# Patient Record
Sex: FEMALE | Race: WHITE | NOT HISPANIC OR LATINO | Employment: FULL TIME | URBAN - METROPOLITAN AREA
[De-identification: names, ages, dates, MRNs, and addresses within clinical notes are randomized per-mention and may not be internally consistent; named-entity substitution may affect disease eponyms.]

---

## 2019-05-10 ENCOUNTER — TRANSFERRED RECORDS (OUTPATIENT)
Dept: HEALTH INFORMATION MANAGEMENT | Facility: CLINIC | Age: 29
End: 2019-05-10

## 2019-11-11 ENCOUNTER — OFFICE VISIT (OUTPATIENT)
Dept: FAMILY MEDICINE | Facility: CLINIC | Age: 29
End: 2019-11-11
Payer: COMMERCIAL

## 2019-11-11 DIAGNOSIS — C81.98 HODGKIN'S DISEASE OF LYMPH NODES OF MULTIPLE SITES (H): ICD-10-CM

## 2019-11-11 DIAGNOSIS — H69.93 DYSFUNCTION OF BOTH EUSTACHIAN TUBES: Primary | ICD-10-CM

## 2019-11-11 DIAGNOSIS — F19.10 SUBSTANCE ABUSE (H): ICD-10-CM

## 2019-11-11 PROCEDURE — 99203 OFFICE O/P NEW LOW 30 MIN: CPT | Performed by: PHYSICIAN ASSISTANT

## 2019-11-11 RX ORDER — METHYLPREDNISOLONE 4 MG
TABLET, DOSE PACK ORAL
Qty: 21 TABLET | Refills: 0 | Status: SHIPPED | OUTPATIENT
Start: 2019-11-11 | End: 2020-10-16

## 2019-11-12 PROBLEM — C81.98 HODGKIN'S DISEASE OF LYMPH NODES OF MULTIPLE SITES (H): Status: ACTIVE | Noted: 2019-11-12

## 2019-11-12 NOTE — PROGRESS NOTES
Subjective     Noemí Garcia is a 29 year old female who presents to clinic today for the following health issues:    HPI   30 y/o NP female here to discuss some trouble hearing.  Ears feels a bit plugged.  This has been going on a few weeks.  She is a new resident at [facility].  Things are going well on that end.  She does admit to some mild congestion and cold symptoms, nothing too bad.  Denies fevers or chills.  No active treatment.    Her medical hx is complicated by Hodgkin's lymphoma.  Her last visit with them was earlier in the year.  Gets care in [place].  She us s/p chemo and lymph node removal, and doing well.  Thinks she is due for follow up.        BP Readings from Last 3 Encounters:   No data found for BP    Wt Readings from Last 3 Encounters:   No data found for Wt                      Reviewed and updated as needed this visit by Provider         Review of Systems   ROS COMP: Constitutional, HEENT, cardiovascular, pulmonary, gi and gu systems are negative, except as otherwise noted.      Objective    There were no vitals taken for this visit.  There is no height or weight on file to calculate BMI.  Physical Exam   GENERAL: alert and no distress  EYES: Eyes grossly normal to inspection  HENT: normal cephalic/atraumatic, both ears: clear effusion, oropharynx clear and oral mucous membranes moist  RESP: lungs clear to auscultation - no rales, rhonchi or wheezes  PSYCH: mentation appears normal, affect normal/bright    Diagnostic Test Results:  Labs reviewed in Epic        Assessment & Plan     1. Dysfunction of both eustachian tubes    - methylPREDNISolone (MEDROL DOSEPAK) 4 MG tablet therapy pack; Follow Package Directions  Dispense: 21 tablet; Refill: 0    2. Substance abuse (H)  Stable, living in [facility], doing well.    3. Hodgkin's disease of lymph nodes of multiple sites (H)  Stable, unsure of remission status.  Thinks she is due for follow up, which she plans on scheduling.                Return in about 2 weeks (around 11/25/2019) for If symptoms persist or worsen.    Gui Lane PA-C  [facility]

## 2020-10-06 ENCOUNTER — OFFICE VISIT (OUTPATIENT)
Dept: OTOLARYNGOLOGY | Facility: OTHER | Age: 30
End: 2020-10-06
Attending: OTOLARYNGOLOGY
Payer: COMMERCIAL

## 2020-10-06 DIAGNOSIS — K11.8 LESION OF SALIVARY GLAND: Primary | ICD-10-CM

## 2020-10-06 PROCEDURE — G0463 HOSPITAL OUTPT CLINIC VISIT: HCPCS

## 2020-10-13 NOTE — PROGRESS NOTES
document embedded image  Patient Name: Noemí Garcia    Address: [address] [place] [address]    YOB: 1990    [place] [address]    MR Number: [id number]    Phone: [phone]  PCP: UNKNOWN            Appointment Date: 10/06/20   Visit Provider: Sammy Marquez MD    cc: UNKNOWN; ~    ENT Progress Note    Visit Reasons: Cyst in Salivary gland    HPI  History of Present Illness  Chief complaint:  Cystic mass right submandibular triangle    History  The patient is a 29-year-old female whose had a variable cystic mass in her right submandibular triangle over the last 6 months to a year.  She had a workup at [facility] that included a soft tissue CT with contrast of her neck.  This confirmed a cystic mass involving her left submandibular triangle and submandibular gland.  She was told surgical intervention was warranted.  She subsequently moved here to the [facility] and is seeking assistance at this time.  She does not have her films available for    Exam  Neck-there is soft pliable cystic mass posteriorly in the submandibular triangle on the right.  There is no surrounding adenopathy.  Oral cavity oropharynx-no evidence of a ranula involving her floor of mouth.  There are no other mucosal lesions  Nasal-no obstruction or purulence  Head and neck integument-Clear  General-the patient appears well and in no distress  Neuro-there are no focal cranial nerve deficits    Home Medications     - Last Reconciled 10/07/20 by Marilyn Andrew CMA    hydroxyzine HCl   PO    Allergies    No Known Allergies Allergy (Unverified 10/07/20 09:52)    PFSH  PFSH:     Medical History (Updated 10/07/20 @ 09:52 by Marilyn Andrew CMA)    Cancer  Hearing loss  Kidney disease     Social History (Updated 10/07/20 @ 09:52 by Marilyn Andrew CMA)  Smoking Status:  Never smoker  alcohol intake:  never       A&P  Assessment & Plan  (1) Lesion of salivary gland:        Status: Acute         Code(s):  K11.8 - Other diseases of salivary glands  Additional Plan Details  Plan Details: The procedure for removal of a submandibular salivary gland was thoroughly reviewed for the patient.  She does seem to understand and wish to proceed.  I would like to get copies of her films [facility] forwarded to us before proceeding with surgery.      Sammy Marquez MD    10/06/20 7242    <Electronically signed by Sammy Marquez MD> 10/07/20 3275

## 2020-10-15 PROBLEM — R42 LIGHTHEADEDNESS: Status: ACTIVE | Noted: 2020-02-28

## 2020-10-15 PROBLEM — F31.81 BIPOLAR II DISORDER, MODERATE, DEPRESSED, WITH ANXIOUS DISTRESS (H): Status: ACTIVE | Noted: 2018-10-05

## 2020-10-15 NOTE — PROGRESS NOTES
"  [facility]  [address]  [place] [address]  Phone: [phone]  Fax: [phone]  Primary Provider: No Ref-Primary, Physician  Pre-op Performing Provider: RACHELLE TRAN    PREOPERATIVE EVALUATION:  Today's date: 10/16/2020    Noemí Garcia is a 29 year old female who presents for a preoperative evaluation.    Nursing Notes:   Vee Rose LPN  10/16/2020  8:36 AM  Sign at exiting of workspace  Date of Surgery: 10/22/2020   Type of Surgery: removal of a submandibular salivary gland   Surgeon: [facility]:  [facility]   Fax:     Fever/Chills or other infectious symptoms in past month: no  >10lb weight loss in past two months: no  O2 SAT: 97    Health Care Directive/Code status:  NO  Hx of blood transfusions:   no  Td up to date:  yes  History of VRE/MRSA:  no Date:     Preoperative Evaluation: Obstructive Sleep Apnea screening    S: Snore -  Do you snore loudly? (louder than talking or loud enough to be heard through closed doors)no  T: Tired - Do you often feel tired, fatigued, or sleepy during the daytime?no  O: Observed - Has anyone ever observed you stop breathing during your sleep?no  P: Pressure - Do you have or are you being treated for high blood pressure?no  B: BMI - BMI greater than 35kg/m2?no  A: Age - Age over 50 years old?no  N: Neck - Neck circumference greater than 40 cm?no  G: Gender - Gender: Male?no    Total number of \"YES\" responses:  0    Scoring: Low risk of ANAM 0-2  At Risk of ANAM: >3 High Risk of ANAM: 5-8    Vee Rose LPN 10/16/2020 8:29 AM        Subjective     HPI related to upcoming procedure:   Presented to ENT early 10/2020 for evaluation following a workup completed [place] [facility] that showed a cystic mass in her right submandibular triangle. Had soft tissue CT with contast of her neck completed at [facility] which confirmed the cystic mass. Recently moved to this area so followed up with Dr. Marquez who recommended surgical treatment " at this time.     Acne: Patient also notes she has been struggling more with facial acne for about the past year. Has not responded to multiple OTC options so now she is to the point where she is not using any facial cleanser or moisturizer. Requesting a prescription treatment today. Has not tried any prescription treatments in the past.       Health Care Directive:  Patient does not have a Health Care Directive or Living Will: Discussed advance care planning with patient; however, patient declined at this time.    Preoperative Review of :   reviewed - no record of controlled substances prescribed.      CHRONIC MEDICAL CONDITIONS:  Hodgkin's disease of lymph nodes: Diagnosed and treated in 2013. Went through chemotherapy. H as been clear since. Continues to follow with oncology yearly.    SUN: Currently well controlled. Takes hydroxyzine as needed.       Review of Systems  CONSTITUTIONAL: NEGATIVE for fever, chills, change in weight  INTEGUMENTARY/SKIN: NEGATIVE for worrisome rashes, moles or lesions  EYES: NEGATIVE for vision changes or irritation  ENT/MOUTH: NEGATIVE for ear, mouth and throat problems  RESP: NEGATIVE for significant cough or SOB  BREAST: NEGATIVE for masses, tenderness or discharge  CV: NEGATIVE for chest pain, palpitations or peripheral edema  GI: NEGATIVE for nausea, abdominal pain, heartburn, or change in bowel habits  : NEGATIVE for frequency, dysuria, or hematuria  MUSCULOSKELETAL: NEGATIVE for significant arthralgias or myalgia  NEURO: NEGATIVE for weakness, dizziness or paresthesias  ENDOCRINE: NEGATIVE for temperature intolerance, skin/hair changes  HEME: NEGATIVE for bleeding problems  PSYCHIATRIC: NEGATIVE for changes in mood or affect    Patient Active Problem List    Diagnosis Date Noted     Hodgkin's disease of lymph nodes of multiple sites (H) 11/12/2019     Priority: Medium      No past medical history on file.  Past Surgical History:   Procedure Laterality Date      CHOLECYSTECTOMY       Current Outpatient Medications   Medication Sig Dispense Refill     methylPREDNISolone (MEDROL DOSEPAK) 4 MG tablet therapy pack Follow Package Directions 21 tablet 0       Not on File     Social History     Tobacco Use     Smoking status: Not on file   Substance Use Topics     Alcohol use: Not on file     No family history on file.  History   Drug Use Not on file         Objective     There were no vitals taken for this visit.    Physical Exam    GENERAL APPEARANCE: healthy, alert and no distress     EYES: EOMI, PERRL     HENT: ear canals and TM's normal and nose and mouth without ulcers or lesions     NECK: no adenopathy, no asymmetry, masses, or scars and thyroid normal to palpation     RESP: lungs clear to auscultation - no rales, rhonchi or wheezes     CV: regular rates and rhythm, normal S1 S2, no S3 or S4 and no murmur, click or rub     ABDOMEN:  soft, nontender, no HSM or masses and bowel sounds normal     MS: extremities normal- no gross deformities noted, no evidence of inflammation in joints, FROM in all extremities.     SKIN: no suspicious lesions or rashes     NEURO: Normal strength and tone, sensory exam grossly normal, mentation intact and speech normal     PSYCH: mentation appears normal. and affect normal/bright     LYMPHATICS: No cervical adenopathy    No results for input(s): HGB, PLT, INR, NA, POTASSIUM, CR, A1C in the last 39714 hours.     Diagnostics:  Labs pending at this time.  Results will be reviewed when available.   No EKG required, no history of coronary heart disease, significant arrhythmia, peripheral arterial disease or other structural heart disease.    Revised Cardiac Risk Index (RCRI):  The patient has the following serious cardiovascular risks for perioperative complications:   - No serious cardiac risks = 0 points     RCRI Interpretation: 0 points: Class I (very low risk - 0.4% complication rate)           Assessment & Plan   The proposed surgical procedure  is considered INTERMEDIATE risk.      ICD-10-CM    1. Preop general physical exam  Z01.818    2. Lesion of salivary gland  K11.8    3. Hodgkin's disease of lymph nodes of multiple sites (H)  C81.98    4. Bipolar II disorder, moderate, depressed, with anxious distress (H)  F31.81    5. Generalized anxiety disorder  F41.1    COVID test ordered and is scheduled to be completed on 10/18/2020.  Given prescription for topical benzoyl peroxide-clindamycin for acne treatment. Educated on medication, use, and side effects. Also encouraged use of daily facial moisturizer. Follow up as needed.     Medication Instructions:  Patient is to take all scheduled medications on the day of surgery    RECOMMENDATION:  APPROVAL GIVEN to proceed with proposed procedure, without further diagnostic evaluation.    Signed Electronically by: Juana Delgado PA-C    Copy of this evaluation report is provided to requesting physician.    [facility]    Revised Cardiac Risk Index

## 2020-10-15 NOTE — PATIENT INSTRUCTIONS

## 2020-10-16 ENCOUNTER — OFFICE VISIT (OUTPATIENT)
Dept: FAMILY MEDICINE | Facility: OTHER | Age: 30
End: 2020-10-16
Attending: PHYSICIAN ASSISTANT
Payer: COMMERCIAL

## 2020-10-16 VITALS
RESPIRATION RATE: 12 BRPM | DIASTOLIC BLOOD PRESSURE: 66 MMHG | OXYGEN SATURATION: 97 % | BODY MASS INDEX: 21.35 KG/M2 | HEIGHT: 62 IN | WEIGHT: 116 LBS | SYSTOLIC BLOOD PRESSURE: 124 MMHG | HEART RATE: 68 BPM | TEMPERATURE: 97.2 F

## 2020-10-16 DIAGNOSIS — C81.98 HODGKIN'S DISEASE OF LYMPH NODES OF MULTIPLE SITES (H): ICD-10-CM

## 2020-10-16 DIAGNOSIS — F41.1 GENERALIZED ANXIETY DISORDER: ICD-10-CM

## 2020-10-16 DIAGNOSIS — Z01.818 PREOP GENERAL PHYSICAL EXAM: Primary | ICD-10-CM

## 2020-10-16 DIAGNOSIS — K11.8 LESION OF SALIVARY GLAND: ICD-10-CM

## 2020-10-16 DIAGNOSIS — L70.0 ACNE VULGARIS: ICD-10-CM

## 2020-10-16 DIAGNOSIS — F31.81 BIPOLAR II DISORDER, MODERATE, DEPRESSED, WITH ANXIOUS DISTRESS (H): ICD-10-CM

## 2020-10-16 LAB — HGB BLD-MCNC: 14.8 G/DL (ref 11.7–15.7)

## 2020-10-16 PROCEDURE — 36415 COLL VENOUS BLD VENIPUNCTURE: CPT | Mod: ZL | Performed by: PHYSICIAN ASSISTANT

## 2020-10-16 PROCEDURE — G0463 HOSPITAL OUTPT CLINIC VISIT: HCPCS

## 2020-10-16 PROCEDURE — 85018 HEMOGLOBIN: CPT | Mod: ZL | Performed by: PHYSICIAN ASSISTANT

## 2020-10-16 PROCEDURE — 99214 OFFICE O/P EST MOD 30 MIN: CPT | Performed by: PHYSICIAN ASSISTANT

## 2020-10-16 RX ORDER — HYDROXYZINE HYDROCHLORIDE 25 MG/1
25 TABLET, FILM COATED ORAL
COMMUNITY
Start: 2019-05-10 | End: 2021-02-02

## 2020-10-16 RX ORDER — CLINDAMYCIN AND BENZOYL PEROXIDE 10; 50 MG/G; MG/G
GEL TOPICAL 2 TIMES DAILY
Qty: 50 G | Refills: 3 | Status: ON HOLD | OUTPATIENT
Start: 2020-10-16 | End: 2021-07-31

## 2020-10-16 SDOH — HEALTH STABILITY: MENTAL HEALTH: HOW OFTEN DO YOU HAVE A DRINK CONTAINING ALCOHOL?: NOT ASKED

## 2020-10-16 SDOH — HEALTH STABILITY: MENTAL HEALTH: HOW MANY STANDARD DRINKS CONTAINING ALCOHOL DO YOU HAVE ON A TYPICAL DAY?: NOT ASKED

## 2020-10-16 SDOH — HEALTH STABILITY: MENTAL HEALTH: HOW OFTEN DO YOU HAVE 6 OR MORE DRINKS ON ONE OCCASION?: NOT ASKED

## 2020-10-16 ASSESSMENT — MIFFLIN-ST. JEOR: SCORE: 1194.42

## 2020-10-16 NOTE — NURSING NOTE
"Date of Surgery: 10/22/2020   Type of Surgery: removal of a submandibular salivary gland   Surgeon: [facility]:  [facility]   Fax:     Fever/Chills or other infectious symptoms in past month: no  >10lb weight loss in past two months: no  O2 SAT: 97    Health Care Directive/Code status:  NO  Hx of blood transfusions:   no  Td up to date:  yes  History of VRE/MRSA:  no Date:     Preoperative Evaluation: Obstructive Sleep Apnea screening    S: Snore -  Do you snore loudly? (louder than talking or loud enough to be heard through closed doors)no  T: Tired - Do you often feel tired, fatigued, or sleepy during the daytime?no  O: Observed - Has anyone ever observed you stop breathing during your sleep?no  P: Pressure - Do you have or are you being treated for high blood pressure?no  B: BMI - BMI greater than 35kg/m2?no  A: Age - Age over 50 years old?no  N: Neck - Neck circumference greater than 40 cm?no  G: Gender - Gender: Male?no    Total number of \"YES\" responses:  0    Scoring: Low risk of ANAM 0-2  At Risk of ANAM: >3 High Risk of ANAM: 5-8    Vee Rose LPN 10/16/2020 8:29 AM    "

## 2020-10-18 ENCOUNTER — ALLIED HEALTH/NURSE VISIT (OUTPATIENT)
Dept: FAMILY MEDICINE | Facility: OTHER | Age: 30
End: 2020-10-18
Payer: COMMERCIAL

## 2020-10-18 DIAGNOSIS — Z20.822 COVID-19 RULED OUT: Primary | ICD-10-CM

## 2020-10-18 DIAGNOSIS — Z01.818 PREOP GENERAL PHYSICAL EXAM: ICD-10-CM

## 2020-10-18 PROCEDURE — C9803 HOPD COVID-19 SPEC COLLECT: HCPCS

## 2020-10-18 PROCEDURE — U0003 INFECTIOUS AGENT DETECTION BY NUCLEIC ACID (DNA OR RNA); SEVERE ACUTE RESPIRATORY SYNDROME CORONAVIRUS 2 (SARS-COV-2) (CORONAVIRUS DISEASE [COVID-19]), AMPLIFIED PROBE TECHNIQUE, MAKING USE OF HIGH THROUGHPUT TECHNOLOGIES AS DESCRIBED BY CMS-2020-01-R: HCPCS | Mod: ZL | Performed by: PHYSICIAN ASSISTANT

## 2020-10-18 PROCEDURE — 99207 PR NO CHARGE NURSE ONLY: CPT

## 2020-10-19 LAB
SARS-COV-2 RNA SPEC QL NAA+PROBE: NOT DETECTED
SPECIMEN SOURCE: NORMAL

## 2020-11-09 ENCOUNTER — VIRTUAL VISIT (OUTPATIENT)
Dept: OBGYN | Facility: OTHER | Age: 30
End: 2020-11-09
Attending: STUDENT IN AN ORGANIZED HEALTH CARE EDUCATION/TRAINING PROGRAM
Payer: COMMERCIAL

## 2020-11-09 DIAGNOSIS — O03.9 MISCARRIAGE: Primary | ICD-10-CM

## 2020-11-09 PROCEDURE — 99207 PR OB VISIT-NO CHARGE - GICH ONLY: CPT | Mod: TEL

## 2020-11-09 NOTE — PROGRESS NOTES
Call to patient who states she that she had a miscarriage. She denies fever, severe pain, or excessive bleeding. Discussed warning signs to watch for with fever severe pain, or bleeding more than one pad per hour. She voices understanding.     Lorna Thacker RN on 11/9/2020 at 1:56 PM

## 2020-11-09 NOTE — PROGRESS NOTES
Call to patient X2 to attempt for intake visit.     Lorna Thacker RN on 11/9/2020 at 1:48 PM

## 2020-11-10 ENCOUNTER — OFFICE VISIT (OUTPATIENT)
Dept: OTOLARYNGOLOGY | Facility: OTHER | Age: 30
End: 2020-11-10
Attending: OTOLARYNGOLOGY
Payer: COMMERCIAL

## 2020-11-10 DIAGNOSIS — Z09 POSTOP CHECK: Primary | ICD-10-CM

## 2020-11-10 PROCEDURE — G0463 HOSPITAL OUTPT CLINIC VISIT: HCPCS

## 2020-11-17 NOTE — PROGRESS NOTES
document embedded image  Patient Name: Noemí Garcia    Address: [address] [place] [address]    YOB: 1990    [place] [address]    MR Number: [id number]    Phone: [phone]  PCP: UNKNOWN            Appointment Date: 11/10/20   Visit Provider: Sammy Marquez MD    cc: UNKNOWN; ~    ENT Progress Note    Visit Reasons: Post OP EX of Cyst    HPI  History of Present Illness  Chief complaint:  Postop check    History  The patient is a 1-year-old female who recently underwent excision of a fairly large ranula of her submandibular triangle on the right.  She is having no difficulties postoperatively.  She had minimal discomfort.    Exam  Her surgical incision is healing beautifully.  There is no evidence of inflammation or infection.  There is no evidence of intraoral persistent mass  Her final pathology was consistent with ranula    Allergies    No Known Allergies Allergy (Unverified 10/07/20 09:52)    PFSH  PFSH:     Medical History (Updated 11/10/20 @ 13:56 by Sammy Marquez MD)    Cancer  Hearing loss  Kidney disease     Social History (Updated 10/07/20 @ 09:52 by Marilyn Andrew CMA)  Smoking Status:  Never smoker  alcohol intake:  never       A&P  Assessment & Plan  (1) Postop check:        Status: Acute        Code(s):  Z09 - Encounter for follow-up examination after completed treatment for conditions other than malignant neoplasm  Additional Plan Details  Plan Details: She will follow up as needed      Sammy Marquez MD    11/10/20 0803    <Electronically signed by Sammy Marquez MD> 11/11/20 1231

## 2020-12-23 ENCOUNTER — VIRTUAL VISIT (OUTPATIENT)
Dept: OBGYN | Facility: OTHER | Age: 30
End: 2020-12-23
Attending: STUDENT IN AN ORGANIZED HEALTH CARE EDUCATION/TRAINING PROGRAM
Payer: COMMERCIAL

## 2020-12-23 VITALS — BODY MASS INDEX: 21.35 KG/M2 | HEIGHT: 62 IN | WEIGHT: 116 LBS

## 2020-12-23 DIAGNOSIS — O36.80X0 ENCOUNTER TO DETERMINE FETAL VIABILITY OF PREGNANCY, SINGLE OR UNSPECIFIED FETUS: Primary | ICD-10-CM

## 2020-12-23 PROCEDURE — 99207 PR NO CHARGE NURSE ONLY: CPT | Mod: TEL

## 2020-12-23 RX ORDER — PRENATAL VIT/IRON FUM/FOLIC AC 27MG-0.8MG
1 TABLET ORAL DAILY
COMMUNITY
End: 2023-07-12

## 2020-12-23 ASSESSMENT — PATIENT HEALTH QUESTIONNAIRE - PHQ9: SUM OF ALL RESPONSES TO PHQ QUESTIONS 1-9: 1

## 2020-12-23 ASSESSMENT — MIFFLIN-ST. JEOR: SCORE: 1189.42

## 2020-12-23 NOTE — NURSING NOTE
"Noemí Garcia is a 30 year old female who is being evaluated via a billable telephone visit.      The patient has been notified of following:     \"This telephone visit will be conducted via a call between you and your physician/provider. We have found that certain health care needs can be provided without the need for a physical exam.  This service lets us provide the care you need with a short phone conversation.  If a prescription is necessary we can send it directly to your pharmacy.  If lab work is needed we can place an order for that and you can then stop by our lab to have the test done at a later time.    Telephone visits are billed at different rates depending on your insurance coverage. During this emergency period, for some insurers they may be billed the same as an in-person visit.  Please reach out to your insurance provider with any questions.    If during the course of the call the physician/provider feels a telephone visit is not appropriate, you will not be charged for this service.\"    Patient has given verbal consent for Telephone visit?  Yes    What phone number would you like to be contacted at? [phone]    How would you like to obtain your AVS? Mail a copy    Phone call duration: 21 minutes    Katherine Daniels RN    "

## 2020-12-23 NOTE — PROGRESS NOTES
"HPI:    This is a 30 year old female patient,  who was called today for OB Intake visit. Patient reports positive pregnancy test at home.     Obstetrical history and OB Demographics updated to the best of this nurse's ability based on patient report. PHQ-9 depression screening and routine Domestic Abuse screening completed. All immediate questions and concerns answered.    Last menstrual period is reported as Patient's last menstrual period was 2020. MODESTO based on LMP is 8/10/21.  Her cycles are regular.  Her last menstrual period was normal.   Since her LMP, she has experienced  nausea, emesis, fatigue and urinary frequency.       Personal OB history includes: age of first pregnancy 22, natural births 1, G  5 and P  3  Previous OB Provider: Dr. Severson, unsure of Doctor for 3rd pregnancy  Previous Delivering Clinic: [facility] ([place]),  clinic [place]  Release of Records: need for  Clinic, Care Everywhere for [place]    Current delivery plan: [facility]  Preferred OB Provider: Any  Current Primary Care Provider: None yet  Pediatrician: none yet    Additional History: History of methamphetamine use. Clean for over 1 year now. Full-time nursing student. Third baby had \"placenta torn in two places\", Hydrops and varix vein. 2nd and 3rd pregnancies she needed stents for kidney stones and hydronephrosis    Have you travelled during the pregnancy?No  Have your sexual partner(s) travelled during the pregnancy?No      HISTORY:   Planned Pregnancy: Yes  Marital Status: Single, in a relationship [facility]  Occupation: Full-time student, nursing  Living in Household: Significant Other and Children    Father of the baby is involved.   Family and father of baby is supportive of current pregnancy.  Past Medical History of Father of Baby:No significant medical history    Past History:  Her past medical history:  Hodgkin's lymphoma, in remission for 6 years, Labs every 6 months through [facility] " hematology clinic, done 2020. Struggles with kidney stones    She has a history of  3 term vaginal deliveries, last was high-risk, miscarriage Oct 2020     Since her last LMP she admits to the use of tobacco.    Pap smear history: YES - age 21-29 PAP every 3 years recommended - OVERDUE    STD/STI history: No STD history    STD/STI symptoms: no noticeable symptoms     Past medical, surgical, social and family history were reviewed and updated in EPIC.    Medications reviewed by this nurse. Current medication list:  Current Outpatient Medications   Medication Sig Dispense Refill     cholecalciferol 25 MCG (1000 UT) TABS Take 2,000 Units by mouth       clindamycin-benzoyl peroxide (BENZACLIN) 1-5 % external gel Apply topically 2 times daily 50 g 3     Prenatal Vit-Fe Fumarate-FA (PRENATAL MULTIVITAMIN W/IRON) 27-0.8 MG tablet Take 1 tablet by mouth daily       hydrOXYzine (ATARAX) 25 MG tablet Take 25 mg by mouth       The following medications were recommended to be discontinued due to Pregnancy Category D status: none  Patient informed to contact her primary care provider as soon as possible to discuss a safer alternative.    Risk factors:  Moderate and moderately severe risks (consult with OB/Gyn)  Previous fetal or  demise: No  History of  delivery: No  History of heart disease Class I: No  Severe anemia, unresponsive to iron therapy: No  Pelvic mass or neoplasm: No  Previous : No  Hyper/hypothyroidism: No  History of postpartum hemorrhage requiring transfusion:No  History of Placenta Accreta: No    High Risk (Pregnancy managed by OB/Gyn)  Multiple pregnancy: No  Pre-gestational diabetes: No  Chronic Hypertension: No  Renal Failure: No  Heart disease, class II or greater: No  Rh Isoimmunization: No  Chronic active hepatitis: No  Convulsive disorder, poorly controlled: No  Isoimmune thrombocytopenia: No  Pre-term premature rupture of membranes: No  Lupus or other autoimmune disorder:  No  Human Immunodeficiency Virus: No      ASSESSMENT/PLAN:     No diagnosis found.    30 year old , 7w1d of pregnancy with MODESTO of 8/10/2021, by Last Menstrual Period    Per standing orders and scope of practice of this nurse, patient will have the following orders placed and completed prior to initial OB visit with the appropriate provider:    --early ultrasound for dating and viability ordered for 6+ weeks gestation based on LMP    --Quantitative Beta HCG and progesterone monitoring if indicated    Counseling given:     - Recommended weight gain for pregnancy: 25-35 lbs.   BMI < 18.5  28-40 lbs   18.5 - 24.9 25-35   25 - 29.9 15-25   > 30  < 15       PLAN/PATIENT INSTRUCTIONS:    Follow up in 1-3 weeks.  Normal exercise.  Normal sexual activity.  Prenatal vitamins.  Anticipated weight gain.    follow-up appointment with Dr. Tawny Deleon for pre-jose ramon care and take multivitamin or pre- vitamins.    Katherine Daniels RN.................................................. 2020 1:22 PM

## 2021-01-04 NOTE — PROGRESS NOTES
New OB Visit    Chief Complaint: Establish care for a new pregnancy    History of Present Illness:  Ms. Noemí Garcia is a 30 year old yo  here today for a new OB visit. She is unsure of her LMP date because she had vaginal bleeding and suspected a miscarriage in November. She endorses early pregnancy symptoms including mild nausea, only a few episodes of emesis, and breast tenderness. She and her boyfriend Andrey, recently moved to [place] from [place]. They are both studying (nursing-Noemí) (drug and alcohol counseling-Andrey). We reviewed her previous medical history including personal history of Hodgkin's Lymphoma and a pregnancy history significant for fetal hydrops that was diagnosed during her last pregnancy at 20 weeks and resolved after follow up at 24-25 weeks. She also had hydronephrosis with prior pregnancies and required stent placements.    We also discussed genetic screening including [place] non-invasive testing, carrier screening for SMA and CF and the [place] screen. All of these tests were offered to the family and she will discuss with Andrey, her boyfriend, at this time.     Medical History:  -Hodgkin's Lymphoma: found out during G1 pregnancy. After he was born she noted an enlarged lymph node- biopsy confirmed lymphoma. S/p chemotherapy and surgery to remove lymph nodes in groin.   Follows up 1/year at [facility]  -Hydronephrosis during pregnancies that required stent placement      Obstetric History:    : FAVD, 7lb 15 oz  G2: , 6lb 14 oz-- required renal stents for hydronephrosis  G3: , placental bleeding at 14 weeks, 20 week US showed hydrops, Verix vein. Notes polyhydramnios, fluid also pericardial fluid and ascites. When she reached 24 weeks there was discussion about early delivery, but by the next US it had reduced and she was able to be carried to term. Normal delivery and  period. Born Dec 22, 2018 after IOL at 37 weeks. 6lb 8oz. During this pregnancy required  renal stents for hydronephrosis  G4: Miscarriage 4-5 weeks  G5:  current    GYN History:  No history of STIs  No history of abnormal pap smears: pap smear collected today    Past Surgical History:  Past Surgical History:   Procedure Laterality Date     CHOLECYSTECTOMY     Lymph node removal after chemo for Hodgkin's  Oct 2020: neck gland removal in [facility]  Multiple procedures to remove kidney stones  Deviated nasal septum repair    Meds:  Current Outpatient Medications   Medication     cholecalciferol 25 MCG (1000 UT) TABS     clindamycin-benzoyl peroxide (BENZACLIN) 1-5 % external gel     hydrOXYzine (ATARAX) 25 MG tablet     Prenatal Vit-Fe Fumarate-FA (PRENATAL MULTIVITAMIN W/IRON) 27-0.8 MG tablet     No current facility-administered medications for this visit.    Not currently taking hydroxyzine    Allergies:     Allergies   Allergen Reactions     Adhesive Tape Other (See Comments)     Blistered skin       Family Medical History:  Specifically denies breast, ovarian, endometrial and colon cancers in her family  She also is not aware of any familial thrombophilias and coagulopathies in her family    Social History:  Social History     Tobacco Use     Smoking status: Current Every Day Smoker     Packs/day: 0.25     Smokeless tobacco: Never Used     Tobacco comment: 12/23/20: 1-2 per day   Substance Use Topics     Alcohol use: Not Currently     Drug use: Not Currently     Types: Methamphetamines     Comment: 12/2020: 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 Last use Oct 2019    ROS:   Skin: negative for rash, bruising  Eyes: negative for visual blurring, double vision  Ears/Nose/Throat: negative for nasal congestion, vertigo  Respiratory: No shortness of breath, dyspnea on exertion, cough, or hemoptysis  Cardiovascular:  "negative for palpitations, chest pain, lower extremity edema and syncope or near-syncope  Gastrointestinal: +mild nausea, negative for vomiting and hematemesis  Genitourinary: negative for, dysuria, frequency and urgency  Musculoskeletal: negative for, back pain and muscular weakness  Neurologic: negative for, headaches, syncope, seizures and local weakness  Psychiatric: negative for, anxiety, depression and hallucinations  Hematologic/Lymphatic/Immunologic: negative for, anemia, chills and fever    Exam:  /70   Pulse 62   Ht 1.6 m (5' 3\")   Wt 57.3 kg (126 lb 6.4 oz)   LMP 2020   BMI 22.39 kg/m      General: No acute distress, well-appearing  Neck: Normal thyroid gland on palpation without nodules, enlargement or pain  Breast: Normal appearing skin on breasts bilaterally, No nodules or masses palpated, no nipple discharge or bleeding  Cardiac: Normal rate, regular rhythm, no murmurs, gallops or rubs, normal perfusion to extremities  Lungs: Clear on auscultation, no wheezing, non-labored respirations  Abdomen: Soft, non-tender, no masses  Pelvic exam: Normal appearing external genitalia, normal appearing vaginal walls with pink ruggae. No noted vaginal discharge or lesions. Cervix is closed without masses, nodules, erythema or discharge at the os. Pap smear collected    Dating ultrasound: Done today  FINDINGS: There is a gestational sac within the uterus. This contains  a yolk sac and an embryo with cardiac activity and a heart rate of 176  bpm. Based on a crown-rump length of 24.9 mm estimated gestational age  is 9 weeks 2 days with an EDC by ultrasound of 2021.                                                                        IMPRESSION: Early intrauterine gestation with estimated age of 9 weeks    Assessment:  Ms. Noemí Garcia is a 30 year old yo  here today to establish care for this pregnancy. Her pregnancy is complicated by personal history of Hodgkin's Lymphoma,     Plan:  # " Routine Prenatal Care  -- Datin week US MODESTO: 2021  -- PNLs: collected today including the following   CBC   RPR   Hep B S Ag   Hep C   Rubella   HIV   ABO/Rh type   Antibody Screen    -- Genetic Screening: Will follow up next visit  -- Anatomy US: Planned for approximately 20 weeks gestation: plan for Level II scan  -- Immunizations: Declines flu, and Tdap at approximately 27 weeks gestation  -- 3rd TM labs including CBC, RPR and 1 hr GTT: Planned for after 28 weeks  -- GBS: Planned for 36 weeks  -- Postpartum Planning: Plans to try breastfeeding, interested in postpartum tubal  -- Delivery Planning: No indication for early IOL at this time. To be discussed continually  -- Return to clinic in 4 weeks for OB follow up visit  -- Planning to do at next visit: Genetic screening choices    # Rh negative  -- Plan for Rhogam at 28 weeks    # History of Maternal Hodgkin's Lymphoma: s/p chemotherapy and surgery to remove groin lymph nodes    # History of previous hydronephrosis and nephrostomy tube placement during last two pregnancies

## 2021-01-05 ENCOUNTER — HOSPITAL ENCOUNTER (OUTPATIENT)
Dept: ULTRASOUND IMAGING | Facility: OTHER | Age: 31
End: 2021-01-05
Attending: STUDENT IN AN ORGANIZED HEALTH CARE EDUCATION/TRAINING PROGRAM
Payer: COMMERCIAL

## 2021-01-05 ENCOUNTER — PRENATAL OFFICE VISIT (OUTPATIENT)
Dept: OBGYN | Facility: OTHER | Age: 31
End: 2021-01-05
Attending: STUDENT IN AN ORGANIZED HEALTH CARE EDUCATION/TRAINING PROGRAM
Payer: COMMERCIAL

## 2021-01-05 VITALS
SYSTOLIC BLOOD PRESSURE: 100 MMHG | DIASTOLIC BLOOD PRESSURE: 70 MMHG | BODY MASS INDEX: 22.39 KG/M2 | HEART RATE: 62 BPM | WEIGHT: 126.4 LBS | HEIGHT: 63 IN

## 2021-01-05 DIAGNOSIS — Z67.91 RH NEGATIVE STATUS DURING PREGNANCY IN FIRST TRIMESTER: ICD-10-CM

## 2021-01-05 DIAGNOSIS — O36.80X0 ENCOUNTER TO DETERMINE FETAL VIABILITY OF PREGNANCY, SINGLE OR UNSPECIFIED FETUS: ICD-10-CM

## 2021-01-05 DIAGNOSIS — O26.891 RH NEGATIVE STATUS DURING PREGNANCY IN FIRST TRIMESTER: ICD-10-CM

## 2021-01-05 DIAGNOSIS — O09.90 HIGH-RISK PREGNANCY, UNSPECIFIED TRIMESTER: Primary | ICD-10-CM

## 2021-01-05 LAB
ABO + RH BLD: NORMAL
ABO + RH BLD: NORMAL
ALBUMIN UR-MCNC: NEGATIVE MG/DL
APPEARANCE UR: CLEAR
BILIRUB UR QL STRIP: NEGATIVE
BLD GP AB SCN SERPL QL: NORMAL
BLOOD BANK CMNT PATIENT-IMP: NORMAL
C TRACH DNA SPEC QL NAA+PROBE: NOT DETECTED
COLOR UR AUTO: ABNORMAL
ERYTHROCYTE [DISTWIDTH] IN BLOOD BY AUTOMATED COUNT: 12.4 % (ref 10–15)
GLUCOSE UR STRIP-MCNC: NEGATIVE MG/DL
HBV SURFACE AG SERPL QL IA: NONREACTIVE
HCT VFR BLD AUTO: 42.4 % (ref 35–47)
HCV AB SERPL QL IA: NONREACTIVE
HGB BLD-MCNC: 14.1 G/DL (ref 11.7–15.7)
HGB UR QL STRIP: NEGATIVE
HIV 1+2 AB+HIV1 P24 AG SERPL QL IA: NONREACTIVE
KETONES UR STRIP-MCNC: NEGATIVE MG/DL
LEUKOCYTE ESTERASE UR QL STRIP: ABNORMAL
MCH RBC QN AUTO: 28.5 PG (ref 26.5–33)
MCHC RBC AUTO-ENTMCNC: 33.3 G/DL (ref 31.5–36.5)
MCV RBC AUTO: 86 FL (ref 78–100)
MUCOUS THREADS #/AREA URNS LPF: PRESENT /LPF
N GONORRHOEA DNA SPEC QL NAA+PROBE: NOT DETECTED
NITRATE UR QL: NEGATIVE
PH UR STRIP: 6 PH (ref 5–7)
PLATELET # BLD AUTO: 269 10E9/L (ref 150–450)
RBC # BLD AUTO: 4.95 10E12/L (ref 3.8–5.2)
RBC #/AREA URNS AUTO: 3 /HPF (ref 0–2)
SOURCE: ABNORMAL
SP GR UR STRIP: 1.02 (ref 1–1.03)
SPECIMEN EXP DATE BLD: NORMAL
SPECIMEN SOURCE: NORMAL
SQUAMOUS #/AREA URNS AUTO: 1 /HPF (ref 0–1)
T PALLIDUM AB SER QL: NONREACTIVE
UROBILINOGEN UR STRIP-MCNC: NORMAL MG/DL (ref 0–2)
WBC # BLD AUTO: 8.8 10E9/L (ref 4–11)
WBC #/AREA URNS AUTO: 5 /HPF (ref 0–5)

## 2021-01-05 PROCEDURE — 86850 RBC ANTIBODY SCREEN: CPT | Mod: ZL | Performed by: STUDENT IN AN ORGANIZED HEALTH CARE EDUCATION/TRAINING PROGRAM

## 2021-01-05 PROCEDURE — 85027 COMPLETE CBC AUTOMATED: CPT | Mod: ZL | Performed by: STUDENT IN AN ORGANIZED HEALTH CARE EDUCATION/TRAINING PROGRAM

## 2021-01-05 PROCEDURE — 87340 HEPATITIS B SURFACE AG IA: CPT | Mod: ZL | Performed by: STUDENT IN AN ORGANIZED HEALTH CARE EDUCATION/TRAINING PROGRAM

## 2021-01-05 PROCEDURE — 87086 URINE CULTURE/COLONY COUNT: CPT | Mod: ZL | Performed by: STUDENT IN AN ORGANIZED HEALTH CARE EDUCATION/TRAINING PROGRAM

## 2021-01-05 PROCEDURE — 87389 HIV-1 AG W/HIV-1&-2 AB AG IA: CPT | Mod: ZL | Performed by: STUDENT IN AN ORGANIZED HEALTH CARE EDUCATION/TRAINING PROGRAM

## 2021-01-05 PROCEDURE — 36415 COLL VENOUS BLD VENIPUNCTURE: CPT | Mod: ZL | Performed by: STUDENT IN AN ORGANIZED HEALTH CARE EDUCATION/TRAINING PROGRAM

## 2021-01-05 PROCEDURE — 86901 BLOOD TYPING SEROLOGIC RH(D): CPT | Mod: ZL | Performed by: STUDENT IN AN ORGANIZED HEALTH CARE EDUCATION/TRAINING PROGRAM

## 2021-01-05 PROCEDURE — 86900 BLOOD TYPING SEROLOGIC ABO: CPT | Mod: ZL | Performed by: STUDENT IN AN ORGANIZED HEALTH CARE EDUCATION/TRAINING PROGRAM

## 2021-01-05 PROCEDURE — 76801 OB US < 14 WKS SINGLE FETUS: CPT

## 2021-01-05 PROCEDURE — 87591 N.GONORRHOEAE DNA AMP PROB: CPT | Mod: ZL | Performed by: STUDENT IN AN ORGANIZED HEALTH CARE EDUCATION/TRAINING PROGRAM

## 2021-01-05 PROCEDURE — 87491 CHLMYD TRACH DNA AMP PROBE: CPT | Mod: ZL | Performed by: STUDENT IN AN ORGANIZED HEALTH CARE EDUCATION/TRAINING PROGRAM

## 2021-01-05 PROCEDURE — 87624 HPV HI-RISK TYP POOLED RSLT: CPT | Mod: ZL | Performed by: STUDENT IN AN ORGANIZED HEALTH CARE EDUCATION/TRAINING PROGRAM

## 2021-01-05 PROCEDURE — 86803 HEPATITIS C AB TEST: CPT | Mod: ZL | Performed by: STUDENT IN AN ORGANIZED HEALTH CARE EDUCATION/TRAINING PROGRAM

## 2021-01-05 PROCEDURE — 99207 PR OB VISIT-NO CHARGE - GICH ONLY: CPT | Performed by: STUDENT IN AN ORGANIZED HEALTH CARE EDUCATION/TRAINING PROGRAM

## 2021-01-05 PROCEDURE — 86762 RUBELLA ANTIBODY: CPT | Mod: ZL | Performed by: STUDENT IN AN ORGANIZED HEALTH CARE EDUCATION/TRAINING PROGRAM

## 2021-01-05 PROCEDURE — G0124 SCREEN C/V THIN LAYER BY MD: HCPCS | Performed by: PATHOLOGY

## 2021-01-05 PROCEDURE — 86780 TREPONEMA PALLIDUM: CPT | Mod: ZL | Performed by: STUDENT IN AN ORGANIZED HEALTH CARE EDUCATION/TRAINING PROGRAM

## 2021-01-05 PROCEDURE — G0123 SCREEN CERV/VAG THIN LAYER: HCPCS | Performed by: STUDENT IN AN ORGANIZED HEALTH CARE EDUCATION/TRAINING PROGRAM

## 2021-01-05 PROCEDURE — 81001 URINALYSIS AUTO W/SCOPE: CPT | Mod: ZL | Performed by: STUDENT IN AN ORGANIZED HEALTH CARE EDUCATION/TRAINING PROGRAM

## 2021-01-05 ASSESSMENT — MIFFLIN-ST. JEOR: SCORE: 1252.48

## 2021-01-05 ASSESSMENT — PAIN SCALES - GENERAL: PAINLEVEL: NO PAIN (0)

## 2021-01-05 NOTE — NURSING NOTE
Pt presents to clinic today for ob physical. Pt does not have an cramping or bleeding at this time.      Medication Reconciliation: complete  Simin Hannah LPN

## 2021-01-06 LAB — RUBV IGG SERPL IA-ACNC: 14 IU/ML

## 2021-01-07 LAB
BACTERIA SPEC CULT: NORMAL
SPECIMEN SOURCE: NORMAL

## 2021-01-14 LAB
FINAL DIAGNOSIS: ABNORMAL
HPV HR 12 DNA CVX QL NAA+PROBE: POSITIVE
HPV16 DNA SPEC QL NAA+PROBE: NEGATIVE
HPV18 DNA SPEC QL NAA+PROBE: NEGATIVE
SPECIMEN DESCRIPTION: ABNORMAL
SPECIMEN SOURCE CVX/VAG CYTO: ABNORMAL

## 2021-01-15 LAB
COPATH REPORT: ABNORMAL
PAP: ABNORMAL

## 2021-02-01 NOTE — PROGRESS NOTES
Return OB Visit    S: Ms. Garcia is feeling well today. She has no acute concerns. Denies leaking of fluid, vaginal bleeding, painful contractions.    O:   /60   Pulse 64   Wt 57.2 kg (126 lb)   LMP 2020   BMI 22.32 kg/m    Gen: Well-appearing, NAD  See OB Flowsheet    FH: n/a   bpm    Assessment:  Ms. Noemí aGrcia is a 30 year old yo  here for OB follow up and colposcopy. She is currently 13w2d. Her pregnancy is complicated by personal history of Hodgkin's Lymphoma, ASCUS HPV+ pap smear,      Plan:  # Routine Prenatal Care  -- Datin week US MODESTO: 2021  -- PNLs: collected today including the following              A neg blood type: ab screen neg              RPR nr              Hep B S Ag neg              Hep C neg              Rubella Immune              HIV neg              ABO/Rh type              Antibody Screen   Urine culture: negative   GC/Chlam: neg     -- Genetic Screening: declines  -- Anatomy US: Planned for approximately 20 weeks gestation: plan for Level II scan  -- Immunizations: Declines flu, and Tdap at approximately 27 weeks gestation  -- 1 hr GTT: planned for 24-28 weeks  -- 3rd TM labs including CBC, RPR: Planned for after 28 weeks  -- GBS: Planned for 36 weeks  -- Postpartum Planning: Plans to try breastfeeding, interested in postpartum tubal  -- Delivery Planning: No indication for early IOL at this time. To be discussed continually  -- Return to clinic in 4 weeks for OB follow up visit  -- Planning to do at next visit: Schedule anatomy Scan     # Rh negative (A neg)  -- Plan for Rhogam at 28 weeks     # History of Maternal Hodgkin's Lymphoma: s/p chemotherapy and surgery to remove groin lymph nodes     # History of previous hydronephrosis and nephrostomy tube placement during last two pregnancies   -----------------------------------    COLPOSCOPY PROCEDURE NOTE    Ms. Duarte is a 30 year old  female presents to clinic today for a colposcopy.  She had a pap  smear on 1/5/21 which showed ASCUS, HPV +. Prior to this she has not abnormal Pap smears. In assessing other potential risk factors for cervical dysplasia, She  does smoke    Prior cervical/vaginal disease: Normal exam without visible pathology.  Prior cervical treatment: no treatment.    Based on the 2019 ASCCP guidelines, with her clinical scenario it is recommended to do a colposcopy as the next step in workup as her risk of having CIN3+ is approximately 4.4%      Procedure:  Pre-operative diagnosis: ASCUS HPV+ (non 16,18)  Post-operative diagnosis: low-grade changes at 12 o'clock  EBL: 0 mL  Anesthesia: none    We discussed the colposcopy procedure in detail and I described the risks/benefits as well as the risks/benefits of acquiring samples for pathology including but not limited to bleeding, cramping and infection. She was in agreement with the plan and signed the consents. Just before the procedure began, we went through a surgical time-out to confirm the patient, procedure to be performed and any allergies.    She was assisted into a dorsal lithotomy position. Examination of the perineum, vulva and vagina all appeared normal. A speculum was gently placed in vagina and adjusted to allow for excellent visualization of cervix. Immediate gross assessment revealed a normal appearing cervix without any lesions, erosions, nodules, masses or extra vascularity. Circumferential ectropion noted. The cervix and upper vagina was then swabbed with acetic acid solution for the next step in evaluation. The colposcope was then adjusted to allow for closer visualization. Mild acetowhite changes were noted at the 12 o'clock position. Green light was used to visualize any abnormal vasculature, this showed no abnormal vessels. Lugol's solution was then applied to the cervix and it was inspected again. After Lugol's application, the 12 o'clock position was noted to have decreased uptake. The transformation zone was clearly  visualized for an adequate colposcopy.     As she is currently pregnant, we discussed that it is recommended to only biopsy if there is strong concern for high grade changes to maintain as much safety in the pregnancy in terms of cervical sufficiency. As there were no ulcerations, lesions, abnormal vascularity, the decision was made not to biopsy at this stage in her pregnancy. We discussed close follow up with repeat colposcopy in the postpartum period. She was in agreement with this plan.     FINDINGS:  Low-grade changes suspected at 12 o'clock position- noted mild acetowhite changes at that position. No abnormal vessels, visible lesion, ulceration.     IMPRESSION & ASSESSMENT:  -Satisfactory colposcopy with squamocolumnar junction clearly visualized in entirety   -No biopsy or ECC performed in setting of pregnancy  -Lesions identified: Yes, at the 12 o'clock position: suspect low-grade changes and biopsy planned for postpartum  -Vessels: No abnormal vessels noted     PLAN:   -Will do pap smear with HPV co-testing as well as repeat colposcopy in the postpartum period.    NATALIE GREGG MD on 2/2/2021 at 8:54 AM

## 2021-02-02 ENCOUNTER — PRENATAL OFFICE VISIT (OUTPATIENT)
Dept: OBGYN | Facility: OTHER | Age: 31
End: 2021-02-02
Attending: STUDENT IN AN ORGANIZED HEALTH CARE EDUCATION/TRAINING PROGRAM
Payer: COMMERCIAL

## 2021-02-02 VITALS
WEIGHT: 126 LBS | DIASTOLIC BLOOD PRESSURE: 60 MMHG | HEART RATE: 64 BPM | BODY MASS INDEX: 22.32 KG/M2 | SYSTOLIC BLOOD PRESSURE: 100 MMHG

## 2021-02-02 DIAGNOSIS — Z34.92 SECOND TRIMESTER PREGNANCY: ICD-10-CM

## 2021-02-02 DIAGNOSIS — R87.610 ASCUS WITH POSITIVE HIGH RISK HPV CERVICAL: Primary | ICD-10-CM

## 2021-02-02 DIAGNOSIS — R87.810 ASCUS WITH POSITIVE HIGH RISK HPV CERVICAL: Primary | ICD-10-CM

## 2021-02-02 PROCEDURE — 99207 PR OB VISIT-NO CHARGE - GICH ONLY: CPT | Performed by: STUDENT IN AN ORGANIZED HEALTH CARE EDUCATION/TRAINING PROGRAM

## 2021-02-02 PROCEDURE — 56820 COLPOSCOPY VULVA: CPT | Performed by: STUDENT IN AN ORGANIZED HEALTH CARE EDUCATION/TRAINING PROGRAM

## 2021-02-02 PROCEDURE — G0463 HOSPITAL OUTPT CLINIC VISIT: HCPCS

## 2021-02-02 ASSESSMENT — PAIN SCALES - GENERAL: PAINLEVEL: NO PAIN (0)

## 2021-02-02 NOTE — NURSING NOTE
Pt presents to clinic today for prenatal care and colposcopy.      Medication Reconciliation: complete  Simin Hannah LPN

## 2021-02-13 ENCOUNTER — APPOINTMENT (OUTPATIENT)
Dept: ULTRASOUND IMAGING | Facility: OTHER | Age: 31
End: 2021-02-13
Attending: PHYSICIAN ASSISTANT
Payer: COMMERCIAL

## 2021-02-13 ENCOUNTER — HOSPITAL ENCOUNTER (EMERGENCY)
Facility: OTHER | Age: 31
Discharge: HOME | End: 2021-02-13
Attending: PHYSICIAN ASSISTANT | Admitting: PHYSICIAN ASSISTANT
Payer: COMMERCIAL

## 2021-02-13 VITALS
WEIGHT: 125 LBS | DIASTOLIC BLOOD PRESSURE: 74 MMHG | SYSTOLIC BLOOD PRESSURE: 104 MMHG | HEART RATE: 66 BPM | TEMPERATURE: 98.1 F | OXYGEN SATURATION: 97 % | RESPIRATION RATE: 20 BRPM | BODY MASS INDEX: 22.14 KG/M2

## 2021-02-13 DIAGNOSIS — O26.899 FLANK PAIN IN PREGNANT PATIENT: ICD-10-CM

## 2021-02-13 DIAGNOSIS — R10.9 FLANK PAIN IN PREGNANT PATIENT: ICD-10-CM

## 2021-02-13 LAB
ALBUMIN SERPL-MCNC: 3.6 G/DL (ref 3.5–5.7)
ALBUMIN UR-MCNC: 100 MG/DL
ALP SERPL-CCNC: 48 U/L (ref 34–104)
ALT SERPL W P-5'-P-CCNC: 12 U/L (ref 7–52)
ANION GAP SERPL CALCULATED.3IONS-SCNC: 8 MMOL/L (ref 3–14)
APPEARANCE UR: ABNORMAL
AST SERPL W P-5'-P-CCNC: 13 U/L (ref 13–39)
BACTERIA #/AREA URNS HPF: ABNORMAL /HPF
BASOPHILS # BLD AUTO: 0 10E9/L (ref 0–0.2)
BASOPHILS NFR BLD AUTO: 0.2 %
BILIRUB SERPL-MCNC: 0.4 MG/DL (ref 0.3–1)
BILIRUB UR QL STRIP: NEGATIVE
BUN SERPL-MCNC: 9 MG/DL (ref 7–25)
CALCIUM SERPL-MCNC: 9.1 MG/DL (ref 8.6–10.3)
CHLORIDE SERPL-SCNC: 105 MMOL/L (ref 98–107)
CO2 SERPL-SCNC: 20 MMOL/L (ref 21–31)
COLOR UR AUTO: YELLOW
CREAT SERPL-MCNC: 0.76 MG/DL (ref 0.6–1.2)
DIFFERENTIAL METHOD BLD: NORMAL
EOSINOPHIL # BLD AUTO: 0.1 10E9/L (ref 0–0.7)
EOSINOPHIL NFR BLD AUTO: 1.2 %
ERYTHROCYTE [DISTWIDTH] IN BLOOD BY AUTOMATED COUNT: 12 % (ref 10–15)
GFR SERPL CREATININE-BSD FRML MDRD: 88 ML/MIN/{1.73_M2}
GLUCOSE SERPL-MCNC: 99 MG/DL (ref 70–105)
GLUCOSE UR STRIP-MCNC: NEGATIVE MG/DL
HCT VFR BLD AUTO: 39.9 % (ref 35–47)
HGB BLD-MCNC: 13.5 G/DL (ref 11.7–15.7)
HGB UR QL STRIP: ABNORMAL
IMM GRANULOCYTES # BLD: 0 10E9/L (ref 0–0.4)
IMM GRANULOCYTES NFR BLD: 0.3 %
KETONES UR STRIP-MCNC: 5 MG/DL
LACTATE BLD-SCNC: 1.1 MMOL/L (ref 0.7–2)
LEUKOCYTE ESTERASE UR QL STRIP: ABNORMAL
LIPASE SERPL-CCNC: 15 U/L (ref 11–82)
LYMPHOCYTES # BLD AUTO: 1.3 10E9/L (ref 0.8–5.3)
LYMPHOCYTES NFR BLD AUTO: 14.2 %
MCH RBC QN AUTO: 28.7 PG (ref 26.5–33)
MCHC RBC AUTO-ENTMCNC: 33.8 G/DL (ref 31.5–36.5)
MCV RBC AUTO: 85 FL (ref 78–100)
MONOCYTES # BLD AUTO: 0.5 10E9/L (ref 0–1.3)
MONOCYTES NFR BLD AUTO: 5.7 %
MUCOUS THREADS #/AREA URNS LPF: PRESENT /LPF
NEUTROPHILS # BLD AUTO: 7.1 10E9/L (ref 1.6–8.3)
NEUTROPHILS NFR BLD AUTO: 78.4 %
NITRATE UR QL: NEGATIVE
PH UR STRIP: 5.5 PH (ref 5–7)
PLATELET # BLD AUTO: 241 10E9/L (ref 150–450)
POTASSIUM SERPL-SCNC: 3.7 MMOL/L (ref 3.5–5.1)
PROT SERPL-MCNC: 6.4 G/DL (ref 6.4–8.9)
RBC # BLD AUTO: 4.71 10E12/L (ref 3.8–5.2)
RBC #/AREA URNS AUTO: >182 /HPF (ref 0–2)
SODIUM SERPL-SCNC: 133 MMOL/L (ref 134–144)
SOURCE: ABNORMAL
SP GR UR STRIP: 1.03 (ref 1–1.03)
SQUAMOUS #/AREA URNS AUTO: 58 /HPF (ref 0–1)
UROBILINOGEN UR STRIP-MCNC: NORMAL MG/DL (ref 0–2)
WBC # BLD AUTO: 9.1 10E9/L (ref 4–11)
WBC #/AREA URNS AUTO: 66 /HPF (ref 0–5)

## 2021-02-13 PROCEDURE — 85025 COMPLETE CBC W/AUTO DIFF WBC: CPT | Performed by: PHYSICIAN ASSISTANT

## 2021-02-13 PROCEDURE — 76815 OB US LIMITED FETUS(S): CPT | Mod: TC | Performed by: PHYSICIAN ASSISTANT

## 2021-02-13 PROCEDURE — 83605 ASSAY OF LACTIC ACID: CPT | Performed by: PHYSICIAN ASSISTANT

## 2021-02-13 PROCEDURE — 80053 COMPREHEN METABOLIC PANEL: CPT | Performed by: PHYSICIAN ASSISTANT

## 2021-02-13 PROCEDURE — 87086 URINE CULTURE/COLONY COUNT: CPT | Performed by: PHYSICIAN ASSISTANT

## 2021-02-13 PROCEDURE — 76770 US EXAM ABDO BACK WALL COMP: CPT

## 2021-02-13 PROCEDURE — 99283 EMERGENCY DEPT VISIT LOW MDM: CPT | Mod: 25 | Performed by: PHYSICIAN ASSISTANT

## 2021-02-13 PROCEDURE — 36415 COLL VENOUS BLD VENIPUNCTURE: CPT | Performed by: PHYSICIAN ASSISTANT

## 2021-02-13 PROCEDURE — 99284 EMERGENCY DEPT VISIT MOD MDM: CPT | Performed by: PHYSICIAN ASSISTANT

## 2021-02-13 PROCEDURE — 83690 ASSAY OF LIPASE: CPT | Performed by: PHYSICIAN ASSISTANT

## 2021-02-13 PROCEDURE — 81001 URINALYSIS AUTO W/SCOPE: CPT | Performed by: EMERGENCY MEDICINE

## 2021-02-13 PROCEDURE — 76815 OB US LIMITED FETUS(S): CPT | Mod: 26 | Performed by: PHYSICIAN ASSISTANT

## 2021-02-13 PROCEDURE — 99284 EMERGENCY DEPT VISIT MOD MDM: CPT | Mod: 25 | Performed by: PHYSICIAN ASSISTANT

## 2021-02-13 PROCEDURE — 250N000013 HC RX MED GY IP 250 OP 250 PS 637: Performed by: PHYSICIAN ASSISTANT

## 2021-02-13 RX ORDER — ACETAMINOPHEN 500 MG
500 TABLET ORAL ONCE
Status: COMPLETED | OUTPATIENT
Start: 2021-02-13 | End: 2021-02-13

## 2021-02-13 RX ADMIN — ACETAMINOPHEN 500 MG: 500 TABLET, FILM COATED ORAL at 14:59

## 2021-02-13 ASSESSMENT — ENCOUNTER SYMPTOMS
BRUISES/BLEEDS EASILY: 0
CONFUSION: 0
SHORTNESS OF BREATH: 0
BACK PAIN: 0
FEVER: 0
ABDOMINAL PAIN: 0
HEMATURIA: 0
CHILLS: 0
ADENOPATHY: 0
WOUND: 0
FLANK PAIN: 1
CHEST TIGHTNESS: 0

## 2021-02-13 NOTE — ED PROVIDER NOTES
History     Chief Complaint   Patient presents with     Flank Pain     HPI  Noemí Garcia is a 30 year old female who presents to the ED for evaluation of flank pain. Patient presents with left sided flank pain x1 week. States no difficulty with urination or hematuria. States she is 15 weeks pregnant. , last pregnancy was a miscarriage. She denies n/v/d. She is still eating and drinking appropriately. She has had kidney stones with previous pregnancies and says this feels similar. Most of her previous stones have passed on their own, however she did require stent placement before.  She denies chest pain, sob, dysuria, increased urinary frequency, vaginal discharge, vaginal bleeding.    Allergies:  Allergies   Allergen Reactions     Adhesive Tape Other (See Comments)     Blistered skin       Problem List:    Patient Active Problem List    Diagnosis Date Noted     Lightheadedness 2020     Priority: Medium     Hodgkin's disease of lymph nodes of multiple sites (H) 2019     Priority: Medium     Bipolar II disorder, moderate, depressed, with anxious distress (H) 10/05/2018     Priority: Medium     Deviated nasal septum 03/10/2016     Priority: Medium     Generalized anxiety disorder 2016     Priority: Medium     Kidney stones 2015     Priority: Medium     Renal colic 2015     Priority: Medium     Anal fissure 07/10/2013     Priority: Medium        Past Medical History:    History reviewed. No pertinent past medical history.    Past Surgical History:    Past Surgical History:   Procedure Laterality Date     CHOLECYSTECTOMY         Family History:    History reviewed. No pertinent family history.    Social History:  Marital Status:  Single [1]  Social History     Tobacco Use     Smoking status: Former Smoker     Packs/day: 0.25     Quit date: 2020     Years since quittin.2     Smokeless tobacco: Never Used     Tobacco comment: 20: 1-2 per day   Substance Use Topics      Alcohol use: Not Currently     Drug use: Not Currently     Types: Methamphetamines     Comment: 12/2020: over 1 year clean        Medications:         Prenatal Vit-Fe Fumarate-FA (PRENATAL MULTIVITAMIN W/IRON) 27-0.8 MG tablet       cholecalciferol 25 MCG (1000 UT) TABS       clindamycin-benzoyl peroxide (BENZACLIN) 1-5 % external gel          Review of Systems   Constitutional: Negative for chills and fever.   HENT: Negative for congestion.    Eyes: Negative for visual disturbance.   Respiratory: Negative for chest tightness and shortness of breath.    Cardiovascular: Negative for chest pain.   Gastrointestinal: Negative for abdominal pain.   Genitourinary: Positive for flank pain. Negative for hematuria.   Musculoskeletal: Negative for back pain.   Skin: Negative for rash and wound.   Neurological: Negative for syncope.   Hematological: Negative for adenopathy. Does not bruise/bleed easily.   Psychiatric/Behavioral: Negative for confusion.       Physical Exam   BP: 118/75  Pulse: 89  Temp: 98.1  F (36.7  C)  Resp: 20  Weight: 56.7 kg (125 lb)  SpO2: 97 %      Physical Exam  Constitutional:       General: She is not in acute distress.     Appearance: She is well-developed. She is not diaphoretic.   HENT:      Head: Normocephalic and atraumatic.   Eyes:      General: No scleral icterus.     Conjunctiva/sclera: Conjunctivae normal.   Neck:      Musculoskeletal: Neck supple.   Cardiovascular:      Rate and Rhythm: Normal rate and regular rhythm.   Pulmonary:      Effort: Pulmonary effort is normal.      Breath sounds: Normal breath sounds.   Abdominal:      Palpations: Abdomen is soft.      Tenderness: There is no abdominal tenderness.   Musculoskeletal:         General: No deformity.   Lymphadenopathy:      Cervical: No cervical adenopathy.   Skin:     General: Skin is warm and dry.      Findings: No rash.   Neurological:      Mental Status: She is alert and oriented to person, place, and time. Mental status is at  baseline.   Psychiatric:         Mood and Affect: Mood normal.         Behavior: Behavior normal.         Thought Content: Thought content normal.         Judgment: Judgment normal.         ED Course        Procedures    Results for orders placed during the hospital encounter of 02/13/21   POC US OB    Impression Focused but limited bedside US appears to show a viable fetus with a well appearing heart rate.              Critical Care time:  none               Results for orders placed or performed during the hospital encounter of 02/13/21 (from the past 24 hour(s))   UA reflex to Microscopic   Result Value Ref Range    Color Urine Yellow     Appearance Urine Cloudy     Glucose Urine Negative NEG^Negative mg/dL    Bilirubin Urine Negative NEG^Negative    Ketones Urine 5 (A) NEG^Negative mg/dL    Specific Gravity Urine 1.035 1.003 - 1.035    Blood Urine Large (A) NEG^Negative    pH Urine 5.5 5.0 - 7.0 pH    Protein Albumin Urine 100 (A) NEG^Negative mg/dL    Urobilinogen mg/dL Normal 0.0 - 2.0 mg/dL    Nitrite Urine Negative NEG^Negative    Leukocyte Esterase Urine Small (A) NEG^Negative    Source Midstream Urine     RBC Urine >182 (H) 0 - 2 /HPF    WBC Urine 66 (H) 0 - 5 /HPF    Bacteria Urine Few (A) NEG^Negative /HPF    Squamous Epithelial /HPF Urine 58 (H) 0 - 1 /HPF    Mucous Urine Present (A) NEG^Negative /LPF   CBC with platelets differential   Result Value Ref Range    WBC 9.1 4.0 - 11.0 10e9/L    RBC Count 4.71 3.8 - 5.2 10e12/L    Hemoglobin 13.5 11.7 - 15.7 g/dL    Hematocrit 39.9 35.0 - 47.0 %    MCV 85 78 - 100 fl    MCH 28.7 26.5 - 33.0 pg    MCHC 33.8 31.5 - 36.5 g/dL    RDW 12.0 10.0 - 15.0 %    Platelet Count 241 150 - 450 10e9/L    Diff Method Automated Method     % Neutrophils 78.4 %    % Lymphocytes 14.2 %    % Monocytes 5.7 %    % Eosinophils 1.2 %    % Basophils 0.2 %    % Immature Granulocytes 0.3 %    Absolute Neutrophil 7.1 1.6 - 8.3 10e9/L    Absolute Lymphocytes 1.3 0.8 - 5.3 10e9/L     Absolute Monocytes 0.5 0.0 - 1.3 10e9/L    Absolute Eosinophils 0.1 0.0 - 0.7 10e9/L    Absolute Basophils 0.0 0.0 - 0.2 10e9/L    Abs Immature Granulocytes 0.0 0 - 0.4 10e9/L   Comprehensive metabolic panel   Result Value Ref Range    Sodium 133 (L) 134 - 144 mmol/L    Potassium 3.7 3.5 - 5.1 mmol/L    Chloride 105 98 - 107 mmol/L    Carbon Dioxide 20 (L) 21 - 31 mmol/L    Anion Gap 8 3 - 14 mmol/L    Glucose 99 70 - 105 mg/dL    Urea Nitrogen 9 7 - 25 mg/dL    Creatinine 0.76 0.60 - 1.20 mg/dL    GFR Estimate 88 >60 mL/min/[1.73_m2]    GFR Estimate If Black >90 >60 mL/min/[1.73_m2]    Calcium 9.1 8.6 - 10.3 mg/dL    Bilirubin Total 0.4 0.3 - 1.0 mg/dL    Albumin 3.6 3.5 - 5.7 g/dL    Protein Total 6.4 6.4 - 8.9 g/dL    Alkaline Phosphatase 48 34 - 104 U/L    ALT 12 7 - 52 U/L    AST 13 13 - 39 U/L   Lipase   Result Value Ref Range    Lipase 15 11 - 82 U/L   Lactic acid whole blood   Result Value Ref Range    Lactic Acid 1.1 0.7 - 2.0 mmol/L   US Renal Complete    Narrative    PROCEDURE: US RENAL COMPLETE    HISTORY: . Left flank pain    TECHNIQUE: Targeted sonographic assessment of the kidneys and bladder  was performed.    COMPARISON:  None.    MEASUREMENTS:    Right renal length: 12 cm  Left renal length: 11.3 cm    RENAL FINDINGS: The right left kidneys aren't normal in size and are  free of masses or hydronephrosis.    BLADDER: Bladder appears normal.      Impression    IMPRESSION:  No hydronephrosis.      MARGARITO NI MD   POC US OB    Impression    Focused but limited bedside US appears to show a viable fetus with a well appearing heart rate.        Medications   acetaminophen (TYLENOL) tablet 500 mg (500 mg Oral Given 2/13/21 1459)       Assessments & Plan (with Medical Decision Making)   Pt nontoxic in NAD. Heart, lung, bowel sounds normal, abd soft, nontender to palpation, nondistended. VSS, afebrile.     Differential is broad and includes diverticulitis, stones, torsion, appendicitis,  UTI/pyelonephritis.     She has very well-appearing lab work with no leukocytosis, normal lactic acid, lipase and unremarkable CMP.  UA does show large blood and 66 white cells, leukocyte esterase and she also has large amount of squamous cells.    Renal US read as: No hydronephrosis.     Focused but limited bedside US appears to show a viable fetus with a well appearing heart rate.     I discussed case with SULAIMAN Edwards. She does not feel that antibiotics are warranted and that most likely the patient is suffering from a ureteral stone that at this time does not appear infected, obstructed, kidney dysfunction or causing intolerable pain or n/v.     Overall, the patient appears to be doing very well. She currently appears safe for discharge and is told to return if her symptoms are worsening such as increased fevers, intractable pain, n/v. She understands that a worsening process could be occurring that we are unable to identify at this time due to trying to avoid CT imaging due to her pregnancy.     A referral is placed for her to f/u with Sulaiman early this next week for reassessment.     Strict return precautions are given to the pt, they will return if symptoms are worsening or concerning. The pt understands and agrees with the plan and they are discharged.     Romeo Mercedes PA-C    I have reviewed the nursing notes.    I have reviewed the findings, diagnosis, plan and need for follow up with the patient.       Discharge Medication List as of 2/13/2021  2:57 PM          Final diagnoses:   Flank pain in pregnant patient       2/13/2021   [facility]     Romeo Mercedes PA  02/13/21 0455

## 2021-02-13 NOTE — ED TRIAGE NOTES
Patient presents with left sided flank pain x1 week. States no difficulty with urination or hematuria. States she is 15 weeks pregnant.

## 2021-02-13 NOTE — ED NOTES
Patient discharged to home, ambulatory. Educated on OTC medication for pain control. Patient verbalized understanding of all instruction.

## 2021-02-14 LAB
BACTERIA SPEC CULT: NORMAL
SPECIMEN SOURCE: NORMAL

## 2021-02-16 ENCOUNTER — OFFICE VISIT (OUTPATIENT)
Dept: OTOLARYNGOLOGY | Facility: OTHER | Age: 31
End: 2021-02-16
Attending: OTOLARYNGOLOGY
Payer: COMMERCIAL

## 2021-02-16 DIAGNOSIS — R22.1 LUMP ON NECK: Primary | ICD-10-CM

## 2021-02-16 PROCEDURE — G0463 HOSPITAL OUTPT CLINIC VISIT: HCPCS

## 2021-03-02 ENCOUNTER — PRENATAL OFFICE VISIT (OUTPATIENT)
Dept: OBGYN | Facility: OTHER | Age: 31
End: 2021-03-02
Attending: STUDENT IN AN ORGANIZED HEALTH CARE EDUCATION/TRAINING PROGRAM
Payer: COMMERCIAL

## 2021-03-02 VITALS
SYSTOLIC BLOOD PRESSURE: 102 MMHG | HEART RATE: 84 BPM | BODY MASS INDEX: 23.38 KG/M2 | WEIGHT: 132 LBS | DIASTOLIC BLOOD PRESSURE: 62 MMHG

## 2021-03-02 DIAGNOSIS — Z67.91 RH NEGATIVE STATUS DURING PREGNANCY IN FIRST TRIMESTER: ICD-10-CM

## 2021-03-02 DIAGNOSIS — Z34.92 SECOND TRIMESTER PREGNANCY: Primary | ICD-10-CM

## 2021-03-02 DIAGNOSIS — R87.610 ASCUS WITH POSITIVE HIGH RISK HPV CERVICAL: ICD-10-CM

## 2021-03-02 DIAGNOSIS — O26.891 RH NEGATIVE STATUS DURING PREGNANCY IN FIRST TRIMESTER: ICD-10-CM

## 2021-03-02 DIAGNOSIS — R87.810 ASCUS WITH POSITIVE HIGH RISK HPV CERVICAL: ICD-10-CM

## 2021-03-02 PROCEDURE — 99207 PR OB VISIT-NO CHARGE - GICH ONLY: CPT | Performed by: STUDENT IN AN ORGANIZED HEALTH CARE EDUCATION/TRAINING PROGRAM

## 2021-03-02 ASSESSMENT — PAIN SCALES - GENERAL: PAINLEVEL: NO PAIN (0)

## 2021-03-02 NOTE — NURSING NOTE
Pt presents to clinic today for prenatal care 17w0d. Pt is still struggling with Nausea, has not tried anything yet to help the nausea.      Medication Reconciliation: complete  Simin Hannah LPN

## 2021-03-02 NOTE — PROGRESS NOTES
Return OB Visit    S: Ms. Garcia is feeling well today. She has no acute concerns. She presented to the emergency department last month for concern of flank pain, suspected passed of a kidney stone with no signs of infection or hydronephrosis/obstruction. Denies leaking of fluid, vaginal bleeding, painful contractions. Notes fetal movements.    O: /62   Pulse 84   Wt 59.9 kg (132 lb)   LMP 2020   BMI 23.38 kg/m    Gen: Well-appearing, NAD  See OB Flowsheet    FH: N/A  FHT: 135 bpm    Assessment:  Ms. Noemí Garcia is a 30 year old yo  here for OB follow up. She is currently 17w0d. Her pregnancy is complicated by personal history of Hodgkin's Lymphoma, ASCUS HPV+ pap smear, history of kidney stones in prior pregnancies.     Plan:  # Routine Prenatal Care  -- Datin week US MODEST[place]: 2021  -- PNLs: collected today including the following              A neg blood type: ab screen neg              RPR nr              Hep B S Ag neg              Hep C neg              Rubella Immune              HIV neg              ABO/Rh type              Antibody Screen              Urine culture: negative              GC/Chlam: neg     -- Genetic Screening: declines  -- Anatomy US: Planned for approximately 20 weeks gestation: plan for Level II scan if any anomaly found on anatomy US  -- Immunizations: Declines flu. Plan for Tdap at approximately 27 weeks gestation  -- 1 hr GTT: planned for 24-28 weeks  -- 3rd TM labs including CBC, RPR: Planned for after 28 weeks  -- GBS: Planned for 36 weeks  -- Postpartum Planning: Plans to try breastfeeding, interested in postpartum tubal  -- Delivery Planning: No indication for early IOL at this time. To be discussed continually  -- Return to clinic in 4 weeks for OB follow up visit  -- Planning to do at next visit: Follow up anatomy scan     # Rh negative (A neg)  -- Plan for Rhogam at 28 weeks     # History of Maternal Hodgkin's Lymphoma: s/p chemotherapy and surgery to  remove groin lymph nodes     # History of previous hydronephrosis and nephrostomy tube placement during last two pregnancies     # Abnormal pap  - [place] performed 2/2/2021 with low-grade changes suspected at 12 o'clock position. As it is low-grade in appearance and she is pregnant, discussed repeat colposcopy, Pap and HPV testing postpartum.

## 2021-03-02 NOTE — PROGRESS NOTES
document embedded image  Patient Name: Noemí Garcia    Address: [address] [place] [address]    YOB: 1990    [place] [address]    MR Number: [id number]    Phone: [phone]  PCP: UNKNOWN            Appointment Date: 02/16/21   Visit Provider: Sammy Marquez MD    cc: UNKNOWN; ~    ENT Progress Note  Visit Reasons: Salivary Gland consult    HPI  History of Present Illness  Chief complaint:  Lump right submandibular triangle    History  The patient is a 30-year-old female who in October of 2020 underwent excision of a cystic mass of her right submandibular triangle that appeared to be a ranula of her sublingual salivary gland that was pushing her submandibular gland posteriorly.  She has recently noticed a small lump formation in her submandibular triangle.    Exam  Neck-I agree there is a little asymmetry of her submandibular triangle.  With bimanual exam I feel a firm area that seems more consistent with scar.  I cannot feel any definite recurrence of a cystic lesion.  Oral cavity oropharynx free of lesions or inflammation  Nasal-no obstruction or purulence  Head neck integument-Clear, her previous surgical scar is nicely placed in a skin crease and has minimal cosmetic consequence  General-the patient appears well and in no distress  Neuro-there are no focal cranial nerve deficits    Allergies    No Known Allergies Allergy (Unverified 10/07/20 09:52)    PFSH  PFSH:     Medical History (Updated 02/16/21 @ 10:20 by Sammy Marquez MD)    Cancer  Hearing loss  Kidney disease     Social History (Updated 10/07/20 @ 09:52 by Marilyn Andrew CMA)  Smoking Status:  Never smoker  alcohol intake:  never       A&P  Assessment & Plan  (1) Lump in neck:        Status: Acute        Code(s):  R22.1 - Localized swelling, mass and lump, neck  Additional Plan Details  Plan Details: The patient is recently discovered that she is pregnant.  I would not recommend any further workup or intervention regarding this  until after she delivers.  I have asked that she check in with me a month or so after she delivers in if there has been any enlargement of this mass we can follow up with imaging to see if this is consistent with scar or recurrent cystic lesion.      Sammy Marquez MD    02/16/21 0758    <Electronically signed by Sammy Marquez MD> 02/16/21 1027

## 2021-03-15 ENCOUNTER — OFFICE VISIT (OUTPATIENT)
Dept: FAMILY MEDICINE | Facility: OTHER | Age: 31
End: 2021-03-15
Attending: NURSE PRACTITIONER
Payer: COMMERCIAL

## 2021-03-15 VITALS
WEIGHT: 133.4 LBS | SYSTOLIC BLOOD PRESSURE: 122 MMHG | HEART RATE: 86 BPM | DIASTOLIC BLOOD PRESSURE: 76 MMHG | TEMPERATURE: 98.6 F | RESPIRATION RATE: 18 BRPM | OXYGEN SATURATION: 98 % | BODY MASS INDEX: 23.63 KG/M2

## 2021-03-15 DIAGNOSIS — N39.9 URINARY PROBLEM IN FEMALE: ICD-10-CM

## 2021-03-15 DIAGNOSIS — R39.89 POSSIBLE URINARY TRACT INFECTION: Primary | ICD-10-CM

## 2021-03-15 LAB
ALBUMIN UR-MCNC: 30 MG/DL
APPEARANCE UR: ABNORMAL
BILIRUB UR QL STRIP: NEGATIVE
COLOR UR AUTO: ABNORMAL
GLUCOSE UR STRIP-MCNC: NEGATIVE MG/DL
HGB UR QL STRIP: ABNORMAL
KETONES UR STRIP-MCNC: NEGATIVE MG/DL
LEUKOCYTE ESTERASE UR QL STRIP: ABNORMAL
MUCOUS THREADS #/AREA URNS LPF: PRESENT /LPF
NITRATE UR QL: NEGATIVE
PH UR STRIP: 6.5 PH (ref 5–7)
RBC #/AREA URNS AUTO: >182 /HPF (ref 0–2)
SOURCE: ABNORMAL
SP GR UR STRIP: 1.02 (ref 1–1.03)
SQUAMOUS #/AREA URNS AUTO: 10 /HPF (ref 0–1)
UROBILINOGEN UR STRIP-MCNC: NORMAL MG/DL (ref 0–2)
WBC #/AREA URNS AUTO: 19 /HPF (ref 0–5)

## 2021-03-15 PROCEDURE — 87086 URINE CULTURE/COLONY COUNT: CPT | Mod: ZL | Performed by: NURSE PRACTITIONER

## 2021-03-15 PROCEDURE — 81001 URINALYSIS AUTO W/SCOPE: CPT | Mod: ZL | Performed by: NURSE PRACTITIONER

## 2021-03-15 PROCEDURE — 99214 OFFICE O/P EST MOD 30 MIN: CPT | Performed by: NURSE PRACTITIONER

## 2021-03-15 PROCEDURE — G0463 HOSPITAL OUTPT CLINIC VISIT: HCPCS

## 2021-03-15 RX ORDER — NITROFURANTOIN 25; 75 MG/1; MG/1
100 CAPSULE ORAL 2 TIMES DAILY
Qty: 10 CAPSULE | Refills: 0 | Status: SHIPPED | OUTPATIENT
Start: 2021-03-15 | End: 2021-03-20

## 2021-03-15 ASSESSMENT — PAIN SCALES - GENERAL: PAINLEVEL: NO PAIN (0)

## 2021-03-15 NOTE — PATIENT INSTRUCTIONS

## 2021-03-15 NOTE — NURSING NOTE
"Chief Complaint   Patient presents with     Urinary Problem     States that she has a history of kidney stones. For the last 2 days she has been noticing blood in her urine. She states that on Friday that her bladder felt like it was spasming.     Initial LMP 11/03/2020  Estimated body mass index is 23.38 kg/m  as calculated from the following:    Height as of 1/5/21: 1.6 m (5' 3\").    Weight as of 3/2/21: 59.9 kg (132 lb).         Medication Reconciliation: Complete      Vance Mccarthy LPN   "

## 2021-03-15 NOTE — PROGRESS NOTES
HPI:    Noemí Garcia is a 30 year old female  who presents [facility] today for feels like she is having bladder cramping and spasm, pelvic pain and left flank pain. Symptoms started on Friday 3/11/21. The patient is 19 weeks pregnant. She was seen in the ED in february for left sided flank pain. A renal ultrasound was completed at that time and was negative for hydronephrosis or a renal stone. The patient also reports noticing gross hematuria, occasionally when she urinates. Not having vaginal bleeding. Denies fever or chills.      History reviewed. No pertinent past medical history.  Past Surgical History:   Procedure Laterality Date     CHOLECYSTECTOMY       Social History     Tobacco Use     Smoking status: Former Smoker     Packs/day: 0.25     Quit date: 2020     Years since quittin.2     Smokeless tobacco: Never Used     Tobacco comment: 20: 1-2 per day   Substance Use Topics     Alcohol use: Not Currently     Current Outpatient Medications   Medication Sig Dispense Refill     cholecalciferol 25 MCG (1000 UT) TABS Take 2,000 Units by mouth       clindamycin-benzoyl peroxide (BENZACLIN) 1-5 % external gel Apply topically 2 times daily 50 g 3     Prenatal Vit-Fe Fumarate-FA (PRENATAL MULTIVITAMIN W/IRON) 27-0.8 MG tablet Take 1 tablet by mouth daily       Allergies   Allergen Reactions     Adhesive Tape Other (See Comments)     Blistered skin         Past medical history, past surgical history, current medications and allergies reviewed and accurate to the best of my knowledge.        ROS:  Refer to HPI    /76   Pulse 86   Temp 98.6  F (37  C) (Tympanic)   Resp 18   Wt 60.5 kg (133 lb 6.4 oz)   LMP 2020   SpO2 98%   BMI 23.63 kg/m      EXAM:  General Appearance: Well appearing female, appropriate appearance for age. No acute distress  Respiratory: normal chest wall and respirations.  Normal effort.  Clear to auscultation bilaterally, no wheezing, crackles or rhonchi.  No  increased work of breathing.  No cough appreciated.  Cardiac: RRR with no murmurs  Abdomen: soft, nontender, no rigidity, no rebound tenderness or guarding, normal bowel sounds present  :  No suprapubic tenderness to palpation.  No CVA tenderness to palpation.    Psychological: normal affect, alert, oriented, and pleasant.       Labs:  Results for orders placed or performed in visit on 03/15/21   UA reflex to Microscopic and Culture     Status: Abnormal    Specimen: Midstream Urine   Result Value Ref Range    Color Urine Light Yellow     Appearance Urine Slightly Cloudy     Glucose Urine Negative NEG^Negative mg/dL    Bilirubin Urine Negative NEG^Negative    Ketones Urine Negative NEG^Negative mg/dL    Specific Gravity Urine 1.017 1.003 - 1.035    Blood Urine Large (A) NEG^Negative    pH Urine 6.5 5.0 - 7.0 pH    Protein Albumin Urine 30 (A) NEG^Negative mg/dL    Urobilinogen mg/dL Normal 0.0 - 2.0 mg/dL    Nitrite Urine Negative NEG^Negative    Leukocyte Esterase Urine Moderate (A) NEG^Negative    Source Midstream Urine     RBC Urine >182 (H) 0 - 2 /HPF    WBC Urine 19 (H) 0 - 5 /HPF    Squamous Epithelial /HPF Urine 10 (H) 0 - 1 /HPF    Mucous Urine Present (A) NEG^Negative /LPF                 ASSESSMENT/PLAN:  1. Urinary problem in female    - UA reflex to Microscopic and Culture  - Urine Culture Aerobic Bacterial    2. Possible urinary tract infection    - nitroFURantoin macrocrystal-monohydrate (MACROBID) 100 MG capsule; Take 1 capsule (100 mg) by mouth 2 times daily for 5 days  Dispense: 10 capsule; Refill: 0    UA results showed blood, protein and leukocyte esterase.     Urine microscopic showed RBC, WBC, squamous epithelial cells and mucous.     Discussed urine results with the patient and informed her that she will be prescribed macrobid BID x 5 days due to her symptoms and urine results.     Patient's CVA tenderness negative bilaterally.     Urine culture results pending.    If the patient's urine  culture shows no growth of bacteria she will need to follow up with her PCP regarding the hematuria.        Encouraged fluids    May use over-the-counter Tylenol or ibuprofen PRN    Discussed warning signs/symptoms indicative of need to f/u    Follow up if symptoms persist or worsen or concerns      I explained my diagnostic considerations and recommendations to the patient, who voiced understanding and agreement with the treatment plan. All questions were answered. We discussed potential side effects of any prescribed or recommended therapies, as well as expectations for response to treatments.    Disclaimer:  This note consists of words and symbols derived from keyboarding, dictation, or using voice recognition software. As a result, there may be errors in the script that have gone undetected. Please consider this when interpreting information found in this note.

## 2021-03-16 ENCOUNTER — TELEPHONE (OUTPATIENT)
Dept: OBGYN | Facility: OTHER | Age: 31
End: 2021-03-16

## 2021-03-16 NOTE — TELEPHONE ENCOUNTER
Patient calls today asking that a prescription for smoking cessation. She reports that she has been trying to quit vaping and has been having a hard time.     Please advise.    Katherine Daniels RN...................3/16/2021 3:55 PM

## 2021-03-17 LAB
BACTERIA SPEC CULT: NORMAL
SPECIMEN SOURCE: NORMAL

## 2021-03-18 ENCOUNTER — TELEPHONE (OUTPATIENT)
Dept: OBGYN | Facility: OTHER | Age: 31
End: 2021-03-18

## 2021-03-18 ENCOUNTER — PRENATAL OFFICE VISIT (OUTPATIENT)
Dept: OBGYN | Facility: OTHER | Age: 31
End: 2021-03-18
Attending: STUDENT IN AN ORGANIZED HEALTH CARE EDUCATION/TRAINING PROGRAM
Payer: COMMERCIAL

## 2021-03-18 VITALS
DIASTOLIC BLOOD PRESSURE: 62 MMHG | SYSTOLIC BLOOD PRESSURE: 110 MMHG | BODY MASS INDEX: 23.63 KG/M2 | WEIGHT: 133.4 LBS | HEART RATE: 82 BPM

## 2021-03-18 DIAGNOSIS — O99.891 CALCULUS OF KIDNEY AFFECTING PREGNANCY IN SECOND TRIMESTER: Primary | ICD-10-CM

## 2021-03-18 DIAGNOSIS — N20.0 CALCULUS OF KIDNEY AFFECTING PREGNANCY IN SECOND TRIMESTER: Primary | ICD-10-CM

## 2021-03-18 LAB
ALBUMIN SERPL-MCNC: 3.7 G/DL (ref 3.5–5.7)
ALBUMIN UR-MCNC: 10 MG/DL
ALP SERPL-CCNC: 54 U/L (ref 34–104)
ALT SERPL W P-5'-P-CCNC: 10 U/L (ref 7–52)
ANION GAP SERPL CALCULATED.3IONS-SCNC: 8 MMOL/L (ref 3–14)
APPEARANCE UR: ABNORMAL
AST SERPL W P-5'-P-CCNC: 14 U/L (ref 13–39)
BACTERIA #/AREA URNS HPF: ABNORMAL /HPF
BILIRUB SERPL-MCNC: 0.5 MG/DL (ref 0.3–1)
BILIRUB UR QL STRIP: NEGATIVE
BUN SERPL-MCNC: 9 MG/DL (ref 7–25)
CALCIUM SERPL-MCNC: 8.8 MG/DL (ref 8.6–10.3)
CHLORIDE SERPL-SCNC: 105 MMOL/L (ref 98–107)
CO2 SERPL-SCNC: 23 MMOL/L (ref 21–31)
COLOR UR AUTO: YELLOW
CREAT SERPL-MCNC: 0.61 MG/DL (ref 0.6–1.2)
GFR SERPL CREATININE-BSD FRML MDRD: >90 ML/MIN/{1.73_M2}
GLUCOSE SERPL-MCNC: 82 MG/DL (ref 70–105)
GLUCOSE UR STRIP-MCNC: NEGATIVE MG/DL
HGB UR QL STRIP: ABNORMAL
KETONES UR STRIP-MCNC: NEGATIVE MG/DL
LEUKOCYTE ESTERASE UR QL STRIP: ABNORMAL
MUCOUS THREADS #/AREA URNS LPF: PRESENT /LPF
NITRATE UR QL: NEGATIVE
PH UR STRIP: 6.5 PH (ref 5–7)
POTASSIUM SERPL-SCNC: 4.1 MMOL/L (ref 3.5–5.1)
PROT SERPL-MCNC: 6.4 G/DL (ref 6.4–8.9)
RBC #/AREA URNS AUTO: >182 /HPF (ref 0–2)
SODIUM SERPL-SCNC: 136 MMOL/L (ref 134–144)
SOURCE: ABNORMAL
SP GR UR STRIP: 1.02 (ref 1–1.03)
SQUAMOUS #/AREA URNS AUTO: 4 /HPF (ref 0–1)
UROBILINOGEN UR STRIP-MCNC: NORMAL MG/DL (ref 0–2)
WBC #/AREA URNS AUTO: 18 /HPF (ref 0–5)

## 2021-03-18 PROCEDURE — 87086 URINE CULTURE/COLONY COUNT: CPT | Mod: ZL | Performed by: STUDENT IN AN ORGANIZED HEALTH CARE EDUCATION/TRAINING PROGRAM

## 2021-03-18 PROCEDURE — 99214 OFFICE O/P EST MOD 30 MIN: CPT | Performed by: STUDENT IN AN ORGANIZED HEALTH CARE EDUCATION/TRAINING PROGRAM

## 2021-03-18 PROCEDURE — 81001 URINALYSIS AUTO W/SCOPE: CPT | Mod: ZL | Performed by: STUDENT IN AN ORGANIZED HEALTH CARE EDUCATION/TRAINING PROGRAM

## 2021-03-18 PROCEDURE — 36415 COLL VENOUS BLD VENIPUNCTURE: CPT | Mod: ZL | Performed by: STUDENT IN AN ORGANIZED HEALTH CARE EDUCATION/TRAINING PROGRAM

## 2021-03-18 PROCEDURE — 80053 COMPREHEN METABOLIC PANEL: CPT | Mod: ZL | Performed by: STUDENT IN AN ORGANIZED HEALTH CARE EDUCATION/TRAINING PROGRAM

## 2021-03-18 PROCEDURE — G0463 HOSPITAL OUTPT CLINIC VISIT: HCPCS | Performed by: STUDENT IN AN ORGANIZED HEALTH CARE EDUCATION/TRAINING PROGRAM

## 2021-03-18 RX ORDER — TAMSULOSIN HYDROCHLORIDE 0.4 MG/1
0.4 CAPSULE ORAL DAILY
Qty: 14 CAPSULE | Refills: 1 | Status: SHIPPED | OUTPATIENT
Start: 2021-03-18 | End: 2021-03-30

## 2021-03-18 ASSESSMENT — PAIN SCALES - GENERAL: PAINLEVEL: NO PAIN (0)

## 2021-03-18 NOTE — NURSING NOTE
Pt presents to clinic today for hematuria that she noticed this am. Pt declines any pain at this time.      Medication Reconciliation: complete  Simin Hannah LPN

## 2021-03-18 NOTE — PROGRESS NOTES
S: Ms. Garcia presents to clinic today with concerns of hematuria. She originally presented to the [facility] with this concern on 3/15. UA showed blood and leukocyte esterase, she was started on macrobid. Urine culture follow up shows no growth and she will stop the Macrobid now. She denies any associated fevers, chills, frequency. She has mild pain along her left lower quadrant.     She has extensive history of kidney stones in previous pregnancies and has required nephrostomy tubes during these pregnancies.      Denies leaking of fluid, vaginal bleeding, painful contractions. Notes fetal movements    O:   /62   Pulse 82   Wt 60.5 kg (133 lb 6.4 oz)   LMP 2020   BMI 23.63 kg/m      Gen: Well-appearing, NAD  Flank: Mild left flank pain, radiates along LLQ  Abdomen: no peritoneal signs  Pelvic: No sign of vaginal bleeding, cervical os is closed, no sign of bleeding at the external cervical os    FH: N/A   bpm    Assessment:  Ms. Noemí Garcia is a 30 year old yo  here for OB follow up. She is currently 19w2d. Her pregnancy is complicated by personal history of Hodgkin's Lymphoma, ASCUS HPV+ pap smear, history of kidney stones in prior pregnancies.     Plan:  # Hematuria  -- Suspected recurrent kidney stone  -- Renal US ordered today to assess for any potential renal obstruction  -- CMP to assess Creatinine  -- Flomax Rx sent to pharmacy  -- UA today shows continued blood, no sign of UTI at this time.  -- If US shows any abnormalities or significant Cr bump, will refer to urology for coordination of care and potential repeat neph tube placement.  No sign of blood at the external os and no sign of blood in the vagina-- do not feel this is vaginal bleeding or pregnancy bleeding so will hold off on Rhogam administration at this time.    # Routine Prenatal Care  -- Datin week US [place]: 2021  -- PNLs: collected today including the following              A neg blood type: ab screen  neg              RPR nr              Hep B S Ag neg              Hep C neg              Rubella Immune              HIV neg              ABO/Rh type              Antibody Screen              Urine culture: negative              GC/Chlam: neg     -- Genetic Screening: declines  -- Anatomy US: Planned for approximately 20 weeks gestation: plan for Level II scan if any anomaly found on anatomy US  -- Immunizations: Declines flu. Plan for Tdap at approximately 27 weeks gestation  -- 1 hr GTT: planned for 24-28 weeks  -- 3rd TM labs including CBC, RPR: Planned for after 28 weeks  -- GBS: Planned for 36 weeks  -- Postpartum Planning: Plans to try breastfeeding, interested in postpartum tubal  -- Delivery Planning: No indication for early IOL at this time. To be discussed continually  -- Return to clinic in 4 weeks for OB follow up visit  -- Planning to do at next visit: Follow up anatomy scan     # Rh negative (A neg)  -- Plan for Rhogam at 28 weeks     # History of Maternal Hodgkin's Lymphoma: s/p chemotherapy and surgery to remove groin lymph nodes     # History of previous hydronephrosis from kidney stones and nephrostomy tube placement during last two pregnancies      # Abnormal pap  - [place] performed 2/2/2021 with low-grade changes suspected at 12 o'clock position. As it is low-grade in appearance and she is pregnant, discussed repeat colposcopy, Pap and HPV testing postpartum.    Total amount of time spent during today's visit was 30 minutes  NATALIE GREGG MD on 3/18/2021 at 11:47 AM

## 2021-03-18 NOTE — TELEPHONE ENCOUNTER
Patient noticing blood in her urine. Culture negative. Still has quite a bit of blood in her urine and there are clots at this time. She states she has mild discomfort in her left side by her hip. She does not think it is vaginal bleeding. She denies any fevers. She feels a little bit nauseous and dizzy intermittently but she had had this earlier in her pregnancy. She will come in and see Dr. Andino for exam per [facility] follow up.     Lorna Thacker RN on 3/18/2021 at 8:23 AM

## 2021-03-19 ENCOUNTER — HOSPITAL ENCOUNTER (OUTPATIENT)
Dept: ULTRASOUND IMAGING | Facility: OTHER | Age: 31
Discharge: HOME | End: 2021-03-19
Attending: STUDENT IN AN ORGANIZED HEALTH CARE EDUCATION/TRAINING PROGRAM | Admitting: STUDENT IN AN ORGANIZED HEALTH CARE EDUCATION/TRAINING PROGRAM
Payer: COMMERCIAL

## 2021-03-19 DIAGNOSIS — O99.891 CALCULUS OF KIDNEY AFFECTING PREGNANCY IN SECOND TRIMESTER: ICD-10-CM

## 2021-03-19 DIAGNOSIS — N20.0 CALCULUS OF KIDNEY AFFECTING PREGNANCY IN SECOND TRIMESTER: ICD-10-CM

## 2021-03-19 PROCEDURE — 76770 US EXAM ABDO BACK WALL COMP: CPT

## 2021-03-20 LAB
BACTERIA SPEC CULT: NORMAL
SPECIMEN SOURCE: NORMAL

## 2021-03-23 ENCOUNTER — HOSPITAL ENCOUNTER (OUTPATIENT)
Dept: ULTRASOUND IMAGING | Facility: OTHER | Age: 31
Discharge: HOME | End: 2021-03-23
Attending: STUDENT IN AN ORGANIZED HEALTH CARE EDUCATION/TRAINING PROGRAM | Admitting: STUDENT IN AN ORGANIZED HEALTH CARE EDUCATION/TRAINING PROGRAM
Payer: COMMERCIAL

## 2021-03-23 DIAGNOSIS — Z34.92 SECOND TRIMESTER PREGNANCY: ICD-10-CM

## 2021-03-23 PROCEDURE — 76805 OB US >/= 14 WKS SNGL FETUS: CPT

## 2021-03-24 NOTE — PROGRESS NOTES
Return OB Visit    S: Paty has been feeling better. She was seen last week for hematuria and history of kidney stones. She was initially started on antibiotics, but these were discontinued when her urine culture returned as negative. We used Flomax. A renal US showed mild renal dilatation, but Cr was normal and she was not in any more residual pain, so the decision was made to continue to monitor with close follow up. She notes today her hematuria has improved significantly. She notes some residual blood only on the first void of the day. She said she feels as though she had passed a few stones last week.     She denies leaking of fluid, vaginal bleeding, painful contractions. Notes fetal movements.    O:   /62   Pulse 72   Wt 62.1 kg (136 lb 12.8 oz)   LMP 2020   BMI 24.23 kg/m      Gen: Well-appearing, NAD  See OB Flowsheet    FH 20 cm   bpm, movement noted on bedside US    Assessment:  Ms. Noemí Garcia is a 30 year old yo  here for OB follow up. She is currently 20w2d. Her pregnancy is complicated by personal history of Hodgkin's Lymphoma, ASCUS HPV+ pap smear, history of kidney stones in prior pregnancies.     Plan:  # Hematuria  -- Suspected recurrent kidney stone  -- Renal US 3/19 shows mild dilation of left renal pelvis, no dilation on the right.   -- Creatinine 3/19: 0.61  -- Repeat Cr today: if elevated, will get repeat renal US  -- If US shows any change or significant Cr bump, will refer to urology for coordination of care and potential repeat neph tube placement.  -- Follow up in 1 week to ensure continued improvement    # Routine Prenatal Care  -- Datin week US [place]: 2021  -- PNLs: collected today including the following              A neg blood type: ab screen neg              RPR nr              Hep B S Ag neg              Hep C neg              Rubella Immune              HIV neg              ABO/Rh type              Antibody Screen              Urine culture:  negative              GC/Chlam: neg     -- Genetic Screening: declines  -- Anatomy US: EFW 19%ile, CL 3.4 cm, MARQUITA 14.5cm, Normal anatomy   Follow up growth US at 28 weeks  -- Immunizations: Declines flu. Plan for Tdap at approximately 27 weeks gestation  -- 1 hr GTT: planned for 24-28 weeks  -- 3rd TM labs including CBC, RPR: Planned for after 28 weeks  -- GBS: Planned for 36 weeks  -- Postpartum Planning: Plans to try breastfeeding, interested in postpartum tubal  -- Delivery Planning: No indication for early IOL at this time. To be discussed continually  -- Planning to do at next visit: 1 hour GTT at 24 weeks     # Rh negative (A neg)  -- Plan for Rhogam at 28 weeks     # History of Maternal Hodgkin's Lymphoma: s/p chemotherapy and surgery to remove groin lymph nodes     # History of previous hydronephrosis from kidney stones and nephrostomy tube placement during last two pregnancies      # Abnormal pap  - [place] performed 2/2/2021 with low-grade changes suspected at 12 o'clock position. As it is low-grade in appearance and she is pregnant, discussed repeat colposcopy, Pap and HPV testing postpartum.        Plan: Follow up in 1 week to ensure continued improvement of hematuria after kidney stone. Will then follow up in approximately 3 weeks for OB follow up

## 2021-03-25 ENCOUNTER — PRENATAL OFFICE VISIT (OUTPATIENT)
Dept: OBGYN | Facility: OTHER | Age: 31
End: 2021-03-25
Attending: STUDENT IN AN ORGANIZED HEALTH CARE EDUCATION/TRAINING PROGRAM
Payer: COMMERCIAL

## 2021-03-25 VITALS
HEART RATE: 72 BPM | WEIGHT: 136.8 LBS | BODY MASS INDEX: 24.23 KG/M2 | SYSTOLIC BLOOD PRESSURE: 102 MMHG | DIASTOLIC BLOOD PRESSURE: 62 MMHG

## 2021-03-25 DIAGNOSIS — O99.891 CALCULUS OF KIDNEY AFFECTING PREGNANCY IN SECOND TRIMESTER: Primary | ICD-10-CM

## 2021-03-25 DIAGNOSIS — R87.610 ASCUS WITH POSITIVE HIGH RISK HPV CERVICAL: ICD-10-CM

## 2021-03-25 DIAGNOSIS — Z34.92 SECOND TRIMESTER PREGNANCY: ICD-10-CM

## 2021-03-25 DIAGNOSIS — N20.0 CALCULUS OF KIDNEY AFFECTING PREGNANCY IN SECOND TRIMESTER: Primary | ICD-10-CM

## 2021-03-25 DIAGNOSIS — O26.891 RH NEGATIVE STATUS DURING PREGNANCY IN FIRST TRIMESTER: ICD-10-CM

## 2021-03-25 DIAGNOSIS — R87.810 ASCUS WITH POSITIVE HIGH RISK HPV CERVICAL: ICD-10-CM

## 2021-03-25 DIAGNOSIS — Z67.91 RH NEGATIVE STATUS DURING PREGNANCY IN FIRST TRIMESTER: ICD-10-CM

## 2021-03-25 LAB
ALBUMIN SERPL-MCNC: 3.5 G/DL (ref 3.5–5.7)
ALP SERPL-CCNC: 51 U/L (ref 34–104)
ALT SERPL W P-5'-P-CCNC: 10 U/L (ref 7–52)
ANION GAP SERPL CALCULATED.3IONS-SCNC: 8 MMOL/L (ref 3–14)
AST SERPL W P-5'-P-CCNC: 12 U/L (ref 13–39)
BILIRUB SERPL-MCNC: 0.4 MG/DL (ref 0.3–1)
BUN SERPL-MCNC: 8 MG/DL (ref 7–25)
CALCIUM SERPL-MCNC: 8.8 MG/DL (ref 8.6–10.3)
CHLORIDE SERPL-SCNC: 107 MMOL/L (ref 98–107)
CO2 SERPL-SCNC: 21 MMOL/L (ref 21–31)
CREAT SERPL-MCNC: 0.53 MG/DL (ref 0.6–1.2)
GFR SERPL CREATININE-BSD FRML MDRD: >90 ML/MIN/{1.73_M2}
GLUCOSE SERPL-MCNC: 98 MG/DL (ref 70–105)
POTASSIUM SERPL-SCNC: 3.8 MMOL/L (ref 3.5–5.1)
PROT SERPL-MCNC: 6.1 G/DL (ref 6.4–8.9)
SODIUM SERPL-SCNC: 136 MMOL/L (ref 134–144)

## 2021-03-25 PROCEDURE — 36415 COLL VENOUS BLD VENIPUNCTURE: CPT | Mod: ZL | Performed by: STUDENT IN AN ORGANIZED HEALTH CARE EDUCATION/TRAINING PROGRAM

## 2021-03-25 PROCEDURE — 80053 COMPREHEN METABOLIC PANEL: CPT | Mod: ZL | Performed by: STUDENT IN AN ORGANIZED HEALTH CARE EDUCATION/TRAINING PROGRAM

## 2021-03-25 PROCEDURE — 99207 PR OB VISIT-NO CHARGE - GICH ONLY: CPT | Performed by: STUDENT IN AN ORGANIZED HEALTH CARE EDUCATION/TRAINING PROGRAM

## 2021-03-25 ASSESSMENT — PAIN SCALES - GENERAL: PAINLEVEL: NO PAIN (0)

## 2021-03-25 NOTE — NURSING NOTE
Pt presents to clinic today for prenatal care 20w2d. Pt states she still has the vaginal bleeding but only notices it in the mornings.    Medication Reconciliation: complete  Simin Hannah LPN

## 2021-03-30 ENCOUNTER — PRENATAL OFFICE VISIT (OUTPATIENT)
Dept: OBGYN | Facility: OTHER | Age: 31
End: 2021-03-30
Attending: STUDENT IN AN ORGANIZED HEALTH CARE EDUCATION/TRAINING PROGRAM
Payer: COMMERCIAL

## 2021-03-30 VITALS
DIASTOLIC BLOOD PRESSURE: 70 MMHG | HEART RATE: 74 BPM | SYSTOLIC BLOOD PRESSURE: 104 MMHG | BODY MASS INDEX: 23.97 KG/M2 | WEIGHT: 135.3 LBS

## 2021-03-30 DIAGNOSIS — Z67.91 RH NEGATIVE STATUS DURING PREGNANCY IN FIRST TRIMESTER: ICD-10-CM

## 2021-03-30 DIAGNOSIS — Z85.72 HISTORY OF LYMPHOMA: ICD-10-CM

## 2021-03-30 DIAGNOSIS — N20.0 CALCULUS OF KIDNEY AFFECTING PREGNANCY IN SECOND TRIMESTER: Primary | ICD-10-CM

## 2021-03-30 DIAGNOSIS — O99.891 CALCULUS OF KIDNEY AFFECTING PREGNANCY IN SECOND TRIMESTER: Primary | ICD-10-CM

## 2021-03-30 DIAGNOSIS — Z34.92 SECOND TRIMESTER PREGNANCY: ICD-10-CM

## 2021-03-30 DIAGNOSIS — O26.891 RH NEGATIVE STATUS DURING PREGNANCY IN FIRST TRIMESTER: ICD-10-CM

## 2021-03-30 PROCEDURE — 99207 PR OB VISIT-NO CHARGE - GICH ONLY: CPT | Performed by: STUDENT IN AN ORGANIZED HEALTH CARE EDUCATION/TRAINING PROGRAM

## 2021-03-30 ASSESSMENT — PAIN SCALES - GENERAL: PAINLEVEL: NO PAIN (0)

## 2021-03-30 NOTE — NURSING NOTE
Pt presents to clinic today for prenatal care 21w0d. Pt states she no longer has any bleeding since stopping the flomax.      Medication Reconciliation: complete  Simin Hannah LPN

## 2021-03-30 NOTE — PROGRESS NOTES
Return OB Visit    S: Noemí is feeling well today. She has no acute concerns. Denies leaking of fluid, vaginal bleeding, painful contractions. Notes fetal movements. Her hematuria has resolved. She denies any dysuria.     O:   /70   Pulse 74   Wt 61.4 kg (135 lb 4.8 oz)   LMP 2020   BMI 23.97 kg/m      Gen: Well-appearing, NAD  See OB Flowsheet    FH 21 cm   bpm    Assessment:  Ms. Noemí Garcia is a 30 year old yo  here for OB follow up. She is currently 21w0d. Her pregnancy is complicated by personal history of Hodgkin's Lymphoma, ASCUS HPV+ pap smear, history of kidney stones in prior pregnancies.     Plan:    # Routine Prenatal Care  -- Datin week US [place]: 2021  -- PNLs: collected today including the following              A neg blood type: ab screen neg              RPR nr              Hep B S Ag neg              Hep C neg              Rubella Immune              HIV neg              ABO/Rh type              Antibody Screen              Urine culture: negative              GC/Chlam: neg     -- Genetic Screening: declines  -- Anatomy US: EFW 19%ile, CL 3.4 cm, MARQUITA 14.5cm, Normal anatomy              Follow up growth US at 28 weeks  -- Immunizations: Declines flu. Plan for Tdap at approximately 27 weeks gestation  -- 1 hr GTT: will be done next visit  -- 3rd TM labs including CBC, RPR: Planned for after 28 weeks  -- GBS: Planned for 36 weeks  -- Postpartum Planning: Plans to try breastfeeding, interested in postpartum tubal  -- Delivery Planning: No indication for early IOL at this time. To be discussed continually  -- Planning to do at next visit: 28 week growth US, Tdap, CBC, RPR, Rhogam     # Rh negative (A neg)  -- Plan for Rhogam at 28 weeks     # History of Maternal Hodgkin's Lymphoma: s/p chemotherapy and surgery to remove groin lymph nodes     # History of previous hydronephrosis from kidney stones and nephrostomy tube placement during last two pregnancies   #  Hematuria- resolved  -- Suspected recurrent kidney stone  -- Renal US 3/19 shows mild dilation of left renal pelvis, no dilation on the right.   -- Creatinine 3/19: 0.61, 3/25: 0.53     # Abnormal pap  - [place] performed 2/2/2021 with low-grade changes suspected at 12 o'clock position. As it is low-grade in appearance and she is pregnant, discussed repeat colposcopy, Pap and HPV testing postpartum.           Plan: Follow up in 3 weeks for OB follow up,  1 hr GTT at that visit    NATALIE GREGG MD on 3/30/2021 at 8:55 AM

## 2021-04-20 ENCOUNTER — PRENATAL OFFICE VISIT (OUTPATIENT)
Dept: OBGYN | Facility: OTHER | Age: 31
End: 2021-04-20
Attending: STUDENT IN AN ORGANIZED HEALTH CARE EDUCATION/TRAINING PROGRAM
Payer: COMMERCIAL

## 2021-04-20 VITALS
DIASTOLIC BLOOD PRESSURE: 70 MMHG | HEART RATE: 98 BPM | SYSTOLIC BLOOD PRESSURE: 104 MMHG | WEIGHT: 137.1 LBS | BODY MASS INDEX: 24.29 KG/M2

## 2021-04-20 DIAGNOSIS — Z33.1 PREGNANCY, INCIDENTAL: Primary | ICD-10-CM

## 2021-04-20 DIAGNOSIS — O99.891 CALCULUS OF KIDNEY AFFECTING PREGNANCY IN SECOND TRIMESTER: ICD-10-CM

## 2021-04-20 DIAGNOSIS — O09.90 HIGH-RISK PREGNANCY, UNSPECIFIED TRIMESTER: ICD-10-CM

## 2021-04-20 DIAGNOSIS — Z34.92 SECOND TRIMESTER PREGNANCY: ICD-10-CM

## 2021-04-20 DIAGNOSIS — Z67.91 RH NEGATIVE STATUS DURING PREGNANCY IN FIRST TRIMESTER: ICD-10-CM

## 2021-04-20 DIAGNOSIS — O26.891 RH NEGATIVE STATUS DURING PREGNANCY IN FIRST TRIMESTER: ICD-10-CM

## 2021-04-20 DIAGNOSIS — R87.610 ASCUS WITH POSITIVE HIGH RISK HPV CERVICAL: ICD-10-CM

## 2021-04-20 DIAGNOSIS — R87.810 ASCUS WITH POSITIVE HIGH RISK HPV CERVICAL: ICD-10-CM

## 2021-04-20 DIAGNOSIS — N20.0 CALCULUS OF KIDNEY AFFECTING PREGNANCY IN SECOND TRIMESTER: ICD-10-CM

## 2021-04-20 DIAGNOSIS — Z33.1 PREGNANCY, INCIDENTAL: ICD-10-CM

## 2021-04-20 DIAGNOSIS — Z85.72 HISTORY OF LYMPHOMA: ICD-10-CM

## 2021-04-20 LAB — GLUCOSE 1H P 50 G GLC PO SERPL-MCNC: 92 MG/DL (ref 60–129)

## 2021-04-20 PROCEDURE — 36415 COLL VENOUS BLD VENIPUNCTURE: CPT | Mod: ZL | Performed by: STUDENT IN AN ORGANIZED HEALTH CARE EDUCATION/TRAINING PROGRAM

## 2021-04-20 PROCEDURE — 99207 PR OB VISIT-NO CHARGE - GICH ONLY: CPT | Performed by: STUDENT IN AN ORGANIZED HEALTH CARE EDUCATION/TRAINING PROGRAM

## 2021-04-20 PROCEDURE — 82950 GLUCOSE TEST: CPT | Mod: ZL | Performed by: STUDENT IN AN ORGANIZED HEALTH CARE EDUCATION/TRAINING PROGRAM

## 2021-04-20 ASSESSMENT — PAIN SCALES - GENERAL: PAINLEVEL: NO PAIN (0)

## 2021-04-20 NOTE — NURSING NOTE
Pt presents to clinic today for Ob check. Pt states baby is moving good, denies any bleeding or leaking of fluids today.      Medication Reconciliation: complete  Simin Hannah LPN

## 2021-04-21 NOTE — TELEPHONE ENCOUNTER
Per Dr. Tawny Deleon, this was discussed on clinic. This encounter will be closed.    Katherine Daniels RN...................4/21/2021 10:28 AM

## 2021-04-25 ENCOUNTER — HEALTH MAINTENANCE LETTER (OUTPATIENT)
Age: 31
End: 2021-04-25

## 2021-04-30 ENCOUNTER — ALLIED HEALTH/NURSE VISIT (OUTPATIENT)
Dept: FAMILY MEDICINE | Facility: OTHER | Age: 31
End: 2021-04-30
Attending: FAMILY MEDICINE
Payer: COMMERCIAL

## 2021-04-30 DIAGNOSIS — R05.9 COUGH: Primary | ICD-10-CM

## 2021-04-30 PROCEDURE — U0003 INFECTIOUS AGENT DETECTION BY NUCLEIC ACID (DNA OR RNA); SEVERE ACUTE RESPIRATORY SYNDROME CORONAVIRUS 2 (SARS-COV-2) (CORONAVIRUS DISEASE [COVID-19]), AMPLIFIED PROBE TECHNIQUE, MAKING USE OF HIGH THROUGHPUT TECHNOLOGIES AS DESCRIBED BY CMS-2020-01-R: HCPCS | Mod: ZL | Performed by: FAMILY MEDICINE

## 2021-04-30 PROCEDURE — C9803 HOPD COVID-19 SPEC COLLECT: HCPCS

## 2021-04-30 PROCEDURE — U0005 INFEC AGEN DETEC AMPLI PROBE: HCPCS | Mod: ZL | Performed by: FAMILY MEDICINE

## 2021-05-01 LAB
LABORATORY COMMENT REPORT: NORMAL
SARS-COV-2 RNA RESP QL NAA+PROBE: NEGATIVE
SARS-COV-2 RNA RESP QL NAA+PROBE: NORMAL
SPECIMEN SOURCE: NORMAL
SPECIMEN SOURCE: NORMAL

## 2021-05-17 NOTE — PROGRESS NOTES
Return OB Visit    S: Noemí is feeling well today. She has no acute concerns. Denies leaking of fluid, vaginal bleeding, painful contractions. Notes fetal movements. Tdap, Rhogam and labs today    O:   /62 mmHg  Gen: Well-appearing, NAD  See OB Flowsheet    FH 26 cm   bpm    Assessment:  Ms. Noemí Garcia is a 30 year old yo  here for OB follow up. She is currently 28w0d. Her pregnancy is complicated by personal history of Hodgkin's Lymphoma, ASCUS HPV+ pap smear, history of kidney stones in prior pregnancies.     Plan:     # Routine Prenatal Care  -- Datin week US [place]: 2021  -- PNLs:               A neg blood type: ab screen neg              RPR nr              Hep B S Ag neg              Hep C neg              Rubella Immune              HIV neg              ABO/Rh type              Antibody Screen              Urine culture: negative              GC/Chlam: neg     -- Genetic Screening: declines  -- Anatomy US: EFW 19%ile, CL 3.4 cm, MARQUITA 14.5cm, Normal anatomy              Follow up growth US: ordered today  -- Immunizations: Declines flu. Tdap today  -- 1 hr GTT: 92  -- 3rd TM labs including CBC, RPR: today  -- GBS: Planned for 36 weeks  -- Postpartum Planning: Plans to try breastfeeding, interested in postpartum tubal  -- Delivery Planning: No indication for early IOL at this time. To be discussed continually  -- Planning to do at next visit: discuss delivery planning     # Rh negative (A neg)  -- Plan for Rhogam at 28 weeks: given today     # History of Maternal Hodgkin's Lymphoma: s/p chemotherapy and surgery to remove groin lymph nodes     # History of previous hydronephrosis from kidney stones and nephrostomy tube placement during last two pregnancies      # Hematuria- resolved  -- Suspected recurrent kidney stone  -- Renal US 3/19 shows mild dilation of left renal pelvis, no dilation on the right.   -- Creatinine 3/19: 0.61, 3/25: 0.53     # Abnormal pap  - [place] performed  2/2/2021 with low-grade changes suspected at 12 o'clock position. As it is low-grade in appearance and she is pregnant, discussed repeat colposcopy, Pap and HPV testing postpartum.

## 2021-05-18 ENCOUNTER — TRANSFERRED RECORDS (OUTPATIENT)
Dept: HEALTH INFORMATION MANAGEMENT | Facility: OTHER | Age: 31
End: 2021-05-18

## 2021-05-18 ENCOUNTER — PRENATAL OFFICE VISIT (OUTPATIENT)
Dept: OBGYN | Facility: OTHER | Age: 31
End: 2021-05-18
Attending: STUDENT IN AN ORGANIZED HEALTH CARE EDUCATION/TRAINING PROGRAM
Payer: COMMERCIAL

## 2021-05-18 DIAGNOSIS — O26.899 RH NEGATIVE STATE IN ANTEPARTUM PERIOD: ICD-10-CM

## 2021-05-18 DIAGNOSIS — Z67.91 RH NEGATIVE STATE IN ANTEPARTUM PERIOD: ICD-10-CM

## 2021-05-18 DIAGNOSIS — O99.891 CALCULUS OF KIDNEY AFFECTING PREGNANCY IN SECOND TRIMESTER: Primary | ICD-10-CM

## 2021-05-18 DIAGNOSIS — N20.0 CALCULUS OF KIDNEY AFFECTING PREGNANCY IN SECOND TRIMESTER: Primary | ICD-10-CM

## 2021-05-18 LAB
ABO + RH BLD: NORMAL
ABO + RH BLD: NORMAL
BASOPHILS # BLD AUTO: 0 10E9/L (ref 0–0.2)
BASOPHILS NFR BLD AUTO: 0.2 %
BLD GP AB SCN SERPL QL: NORMAL
BLOOD BANK CMNT PATIENT-IMP: NORMAL
BLOOD BANK CMNT PATIENT-IMP: NORMAL
DATE RH IMM GL GVN: NORMAL
DIFFERENTIAL METHOD BLD: NORMAL
EOSINOPHIL # BLD AUTO: 0.1 10E9/L (ref 0–0.7)
EOSINOPHIL NFR BLD AUTO: 1.7 %
ERYTHROCYTE [DISTWIDTH] IN BLOOD BY AUTOMATED COUNT: 12.5 % (ref 10–15)
FETAL CELL SCN BLD QL ROSETTE: NORMAL
HCT VFR BLD AUTO: 38.3 % (ref 35–47)
HGB BLD-MCNC: 13.3 G/DL (ref 11.7–15.7)
IMM GRANULOCYTES # BLD: 0 10E9/L (ref 0–0.4)
IMM GRANULOCYTES NFR BLD: 0.5 %
LYMPHOCYTES # BLD AUTO: 1 10E9/L (ref 0.8–5.3)
LYMPHOCYTES NFR BLD AUTO: 15.3 %
MCH RBC QN AUTO: 30.2 PG (ref 26.5–33)
MCHC RBC AUTO-ENTMCNC: 34.7 G/DL (ref 31.5–36.5)
MCV RBC AUTO: 87 FL (ref 78–100)
MONOCYTES # BLD AUTO: 0.4 10E9/L (ref 0–1.3)
MONOCYTES NFR BLD AUTO: 6 %
NEUTROPHILS # BLD AUTO: 5.1 10E9/L (ref 1.6–8.3)
NEUTROPHILS NFR BLD AUTO: 76.3 %
PLATELET # BLD AUTO: 207 10E9/L (ref 150–450)
RBC # BLD AUTO: 4.4 10E12/L (ref 3.8–5.2)
RH IG VIALS RECOM PATIENT: NORMAL
WBC # BLD AUTO: 6.6 10E9/L (ref 4–11)

## 2021-05-18 PROCEDURE — 86850 RBC ANTIBODY SCREEN: CPT | Mod: ZL | Performed by: STUDENT IN AN ORGANIZED HEALTH CARE EDUCATION/TRAINING PROGRAM

## 2021-05-18 PROCEDURE — 250N000011 HC RX IP 250 OP 636: Performed by: STUDENT IN AN ORGANIZED HEALTH CARE EDUCATION/TRAINING PROGRAM

## 2021-05-18 PROCEDURE — 86780 TREPONEMA PALLIDUM: CPT | Mod: ZL | Performed by: STUDENT IN AN ORGANIZED HEALTH CARE EDUCATION/TRAINING PROGRAM

## 2021-05-18 PROCEDURE — 90715 TDAP VACCINE 7 YRS/> IM: CPT

## 2021-05-18 PROCEDURE — 86901 BLOOD TYPING SEROLOGIC RH(D): CPT | Mod: ZL | Performed by: STUDENT IN AN ORGANIZED HEALTH CARE EDUCATION/TRAINING PROGRAM

## 2021-05-18 PROCEDURE — 99207 PR OB VISIT-NO CHARGE - GICH ONLY: CPT | Performed by: STUDENT IN AN ORGANIZED HEALTH CARE EDUCATION/TRAINING PROGRAM

## 2021-05-18 PROCEDURE — 86900 BLOOD TYPING SEROLOGIC ABO: CPT | Mod: ZL | Performed by: STUDENT IN AN ORGANIZED HEALTH CARE EDUCATION/TRAINING PROGRAM

## 2021-05-18 PROCEDURE — 85025 COMPLETE CBC W/AUTO DIFF WBC: CPT | Mod: ZL | Performed by: STUDENT IN AN ORGANIZED HEALTH CARE EDUCATION/TRAINING PROGRAM

## 2021-05-18 PROCEDURE — 96372 THER/PROPH/DIAG INJ SC/IM: CPT | Performed by: STUDENT IN AN ORGANIZED HEALTH CARE EDUCATION/TRAINING PROGRAM

## 2021-05-18 PROCEDURE — 36415 COLL VENOUS BLD VENIPUNCTURE: CPT | Mod: ZL | Performed by: STUDENT IN AN ORGANIZED HEALTH CARE EDUCATION/TRAINING PROGRAM

## 2021-05-18 PROCEDURE — 85461 HEMOGLOBIN FETAL: CPT | Mod: ZL | Performed by: STUDENT IN AN ORGANIZED HEALTH CARE EDUCATION/TRAINING PROGRAM

## 2021-05-18 RX ADMIN — RHO(D) IMMUNE GLOBULIN (HUMAN) 300 MCG: 1500 SOLUTION INTRAMUSCULAR at 09:20

## 2021-05-18 NOTE — NURSING NOTE
Pt presents to clinic today for prenatal care 28w0d. Pt denies any bleeding, or leakage of fluid at this time, and baby is moving a lot.      Medication Reconciliation: complete  Simin Hannah LPN

## 2021-05-19 ENCOUNTER — ALLIED HEALTH/NURSE VISIT (OUTPATIENT)
Dept: OBGYN | Facility: OTHER | Age: 31
End: 2021-05-19
Attending: STUDENT IN AN ORGANIZED HEALTH CARE EDUCATION/TRAINING PROGRAM
Payer: COMMERCIAL

## 2021-05-19 ENCOUNTER — DOCUMENTATION ONLY (OUTPATIENT)
Dept: OBGYN | Facility: OTHER | Age: 31
End: 2021-05-19

## 2021-05-19 ENCOUNTER — HOSPITAL ENCOUNTER (OUTPATIENT)
Dept: ULTRASOUND IMAGING | Facility: OTHER | Age: 31
End: 2021-05-19
Attending: STUDENT IN AN ORGANIZED HEALTH CARE EDUCATION/TRAINING PROGRAM
Payer: COMMERCIAL

## 2021-05-19 DIAGNOSIS — O99.891 CALCULUS OF KIDNEY AFFECTING PREGNANCY IN SECOND TRIMESTER: ICD-10-CM

## 2021-05-19 DIAGNOSIS — O36.5920 POOR FETAL GROWTH AFFECTING MANAGEMENT OF MOTHER IN SECOND TRIMESTER, SINGLE OR UNSPECIFIED FETUS: Primary | ICD-10-CM

## 2021-05-19 DIAGNOSIS — N20.0 CALCULUS OF KIDNEY AFFECTING PREGNANCY IN SECOND TRIMESTER: ICD-10-CM

## 2021-05-19 DIAGNOSIS — Z67.91 RH NEGATIVE STATE IN ANTEPARTUM PERIOD: ICD-10-CM

## 2021-05-19 DIAGNOSIS — O26.899 RH NEGATIVE STATE IN ANTEPARTUM PERIOD: ICD-10-CM

## 2021-05-19 LAB — T PALLIDUM AB SER QL: NONREACTIVE

## 2021-05-19 PROCEDURE — 96372 THER/PROPH/DIAG INJ SC/IM: CPT | Performed by: STUDENT IN AN ORGANIZED HEALTH CARE EDUCATION/TRAINING PROGRAM

## 2021-05-19 PROCEDURE — 250N000011 HC RX IP 250 OP 636: Performed by: STUDENT IN AN ORGANIZED HEALTH CARE EDUCATION/TRAINING PROGRAM

## 2021-05-19 PROCEDURE — 76816 OB US FOLLOW-UP PER FETUS: CPT

## 2021-05-19 PROCEDURE — 59025 FETAL NON-STRESS TEST: CPT | Performed by: STUDENT IN AN ORGANIZED HEALTH CARE EDUCATION/TRAINING PROGRAM

## 2021-05-19 RX ORDER — BETAMETHASONE SODIUM PHOSPHATE AND BETAMETHASONE ACETATE 3; 3 MG/ML; MG/ML
12 INJECTION, SUSPENSION INTRA-ARTICULAR; INTRALESIONAL; INTRAMUSCULAR; SOFT TISSUE DAILY
Status: COMPLETED | OUTPATIENT
Start: 2021-05-19 | End: 2021-05-20

## 2021-05-19 RX ADMIN — BETAMETHASONE SODIUM PHOSPHATE AND BETAMETHASONE ACETATE 12 MG: 3; 3 INJECTION, SUSPENSION INTRA-ARTICULAR; INTRALESIONAL; INTRAMUSCULAR at 16:31

## 2021-05-19 NOTE — PROGRESS NOTES
# IUGR  US from 2021: overall EFW 5%ile   AC 5%ile   BPD 9%ile   HC 15%ile   FL 8%ile    -- Serial Growth US q 3-4 weeks   Next due 2021  -- Weekly BPPs with Umbilical artery doppler studies  -- UADs   : normal umbilical artery dopplers (2.5-3.1)  -- Weekly BPPs with Umbilical artery doppler studies  -- Weekly NSTs a few days apart from BPP each week  -- s/p betamethasone given  and she will return to clinic in the afternoon of  in the event that she needs a late  delivery due to any findings on UAD or NST.  -- Delivery timing: Recommendations per the Society of Maternal Fetal medicine:For estimated fetal weight between the 3rd and 10th percentile and normal umbilical artery Doppler, delivery is suggested at 38 0/7 and 39 0/7 weeks of gestation. In cases of isolated fetal growth restriction with an estimated fetal weight less than the third percentile, delivery is recommended at 37 0/7 weeks of gestation or at diagnosis if diagnosed earlier 137. Earlier delivery is indicated in cases of absent or reverse umbilical artery flow       When delivery for fetal growth restriction is anticipated before 34 weeks of gestation, the delivery should be planned at a center with a  intensive care unit and, ideally, after consultation with a maternal-fetal specialist.  corticosteroids are recommended if delivery is anticipated before 33 6/7 weeks of gestation because they are associated with improved   outcomes. In addition,  corticosteroids are recommended for women in whom delivery is anticipated between 34 0/7 weeks and 36 6/7 weeks of gestation, who are at risk of  delivery within 7 days, and who have not received a previous course of  corticosteroids. For cases in which delivery occurs before 32 weeks of gestation, magnesium sulfate should be considered for fetal and  neuroprotection in accordance with one of the accepted published  protocols

## 2021-05-20 ENCOUNTER — ALLIED HEALTH/NURSE VISIT (OUTPATIENT)
Dept: OBGYN | Facility: OTHER | Age: 31
End: 2021-05-20
Attending: STUDENT IN AN ORGANIZED HEALTH CARE EDUCATION/TRAINING PROGRAM
Payer: COMMERCIAL

## 2021-05-20 DIAGNOSIS — O36.5920 POOR FETAL GROWTH AFFECTING MANAGEMENT OF MOTHER IN SECOND TRIMESTER, SINGLE OR UNSPECIFIED FETUS: Primary | ICD-10-CM

## 2021-05-20 PROCEDURE — 250N000011 HC RX IP 250 OP 636: Performed by: STUDENT IN AN ORGANIZED HEALTH CARE EDUCATION/TRAINING PROGRAM

## 2021-05-20 PROCEDURE — 96372 THER/PROPH/DIAG INJ SC/IM: CPT | Performed by: STUDENT IN AN ORGANIZED HEALTH CARE EDUCATION/TRAINING PROGRAM

## 2021-05-20 RX ADMIN — BETAMETHASONE SODIUM PHOSPHATE AND BETAMETHASONE ACETATE 12 MG: 3; 3 INJECTION, SUSPENSION INTRA-ARTICULAR; INTRALESIONAL; INTRAMUSCULAR at 15:54

## 2021-05-21 DIAGNOSIS — F31.81 BIPOLAR II DISORDER, MODERATE, DEPRESSED, WITH ANXIOUS DISTRESS (H): ICD-10-CM

## 2021-05-21 DIAGNOSIS — C81.98 HODGKIN'S DISEASE OF LYMPH NODES OF MULTIPLE SITES (H): Primary | ICD-10-CM

## 2021-05-21 DIAGNOSIS — O36.5990 PREGNANCY AFFECTED BY FETAL GROWTH RESTRICTION: ICD-10-CM

## 2021-05-24 ENCOUNTER — PRE VISIT (OUTPATIENT)
Dept: MATERNAL FETAL MEDICINE | Facility: CLINIC | Age: 31
End: 2021-05-24

## 2021-05-25 ENCOUNTER — HOSPITAL ENCOUNTER (OUTPATIENT)
Dept: ULTRASOUND IMAGING | Facility: CLINIC | Age: 31
End: 2021-05-25
Attending: OBSTETRICS & GYNECOLOGY
Payer: COMMERCIAL

## 2021-05-25 ENCOUNTER — OFFICE VISIT (OUTPATIENT)
Dept: MATERNAL FETAL MEDICINE | Facility: CLINIC | Age: 31
End: 2021-05-25
Attending: STUDENT IN AN ORGANIZED HEALTH CARE EDUCATION/TRAINING PROGRAM
Payer: COMMERCIAL

## 2021-05-25 ENCOUNTER — OFFICE VISIT (OUTPATIENT)
Dept: MATERNAL FETAL MEDICINE | Facility: CLINIC | Age: 31
End: 2021-05-25
Attending: OBSTETRICS & GYNECOLOGY
Payer: COMMERCIAL

## 2021-05-25 DIAGNOSIS — O36.5990 PREGNANCY AFFECTED BY FETAL GROWTH RESTRICTION: ICD-10-CM

## 2021-05-25 DIAGNOSIS — O36.5930 POOR FETAL GROWTH AFFECTING MANAGEMENT OF MOTHER IN THIRD TRIMESTER, SINGLE OR UNSPECIFIED FETUS: Primary | ICD-10-CM

## 2021-05-25 DIAGNOSIS — O26.90 PREGNANCY RELATED CONDITION, ANTEPARTUM: ICD-10-CM

## 2021-05-25 PROCEDURE — 76811 OB US DETAILED SNGL FETUS: CPT | Mod: 26 | Performed by: OBSTETRICS & GYNECOLOGY

## 2021-05-25 PROCEDURE — 76818 FETAL BIOPHYS PROFILE W/NST: CPT | Mod: 26 | Performed by: OBSTETRICS & GYNECOLOGY

## 2021-05-25 PROCEDURE — 59025 FETAL NON-STRESS TEST: CPT

## 2021-05-25 PROCEDURE — 76819 FETAL BIOPHYS PROFIL W/O NST: CPT

## 2021-05-25 NOTE — NURSING NOTE
NST performed for FGR on outside US, Dr. Lopez reviewed EFM. See NST Flowsheet for documentation.  Meghan Sifuentes RN

## 2021-05-25 NOTE — PROGRESS NOTES
Please see full imaging report from [facility] program under imaging tab.    Ghazala Lopez MD  Maternal Fetal Medicine

## 2021-05-25 NOTE — PROGRESS NOTES
Please see full imaging report from [facility] program under imaging tab.    Thank-you for referring your patient for a comprehensive ultrasound. She is referred due to a finding of fetal growth restriction at her primary OB clinic in [place], [place]. Umbilical artery Dopplers were performed at that time and were normal. Due to concern of possible early delivery, she did complete a course of betamethasone at that time as well.     History is significant for three prior term deliveries of normal size infants, with her third pregnancy complicated by unexplained non-immune hydrops, with a normal  outcome. She did require a CVS in her first pregnancy (at [place]) due to a thick NT in the first trimester, also with a normal outcome. She has a history of renal stones, including one suspected stone this pregnancy. She also has a history of bipolar disorder, and Hodgkins lymphoma fully treated 7 years ago at [facility].     She declined aneuploidy assessment for this pregnancy.     I discussed the findings on today's ultrasound with the patient and her partner. Today normal fetal growth at the 25% was noted, with no concern for fetal abnormalities or growth lag. Fetal surveillance with NST (and subsequent BPP due to nonreactive NST) was performed prior to the assessment of fetal growth. I reviewed the limitations of ultrasound both in detecting aneuploidy and structural abnormalities.  Ultrasound can routinely detect 80-90% of structural abnormalities.  However we reviewed the reassuring findings on our US today.     I do recommend that we continue to follow up on fetal growth, and this can be done with MFM through telehealth in the next 3-4 weeks through our team. We will coordinate this today. No follow up here in person is indicated at this time. No early delivery is indicated at this time.     A formal MFM consultation was not billed today, given the reassuring fetal growth.     Return to primary provider for continued  prenatal care.    If you have questions regarding today's evaluation or if we can be of further service, please contact the [facility].    Ghazala Lopez MD  Maternal Fetal Medicine

## 2021-05-25 NOTE — NURSING NOTE
Noemí here for MFM consult due to preg c/b h/o FGR on outside U/S. Pt reports +FM, denies ctx, denies SRoM, and denies vag bleeding. Pt had NST that was not reactive and bpp 8/8. Pt didn't have FGR on U/S today. Dr. Booker and Dr. Baldwin in to see pt. Pt scheduled for telemed U/S in 3-4 weeks. Pt left amb and stable. Jane Martin RN

## 2021-05-26 NOTE — PROGRESS NOTES
Return OB Visit    S: Noemí is feeling well today. She has no acute concerns. Denies leaking of fluid, vaginal bleeding, painful contractions. Notes fetal movements. She met with the MFM team and had a follow up US which showed an EFW 25%ile with AC 27%ile not consistent with IUGR.     O:   /82   Pulse 86   Wt 64.5 kg (142 lb 3.2 oz)   LMP 2020   BMI 25.19 kg/m      Gen: Well-appearing, NAD  See OB Flowsheet    FH 28 cm   bpm    Assessment:  Ms. Noemí Garcia is a 30 year old yo  here for OB follow up. She is currently 29w2d. Her pregnancy is complicated by personal history of Hodgkin's Lymphoma, ASCUS HPV+ pap smear, history of kidney stones in prior pregnancies.     Plan:     # Routine Prenatal Care  -- Datin week US [place]: 2021  -- PNLs:               A neg blood type: ab screen neg              RPR nr              Hep B S Ag neg              Hep C neg              Rubella Immune              HIV neg              ABO/Rh type              Antibody Screen              Urine culture: negative              GC/Chlam: neg     -- Genetic Screening: declines  -- Anatomy US: EFW 19%ile, CL 3.4 cm, MARQUITA 14.5cm, Normal anatomy  -- Immunizations: Declines flu. Tdap , Rhogam   -- 1 hr GTT: 92  -- 3rd TM labs including CBC, RPR: RPR nr, Hgb 13.3  -- GBS: Planned for 36 weeks  -- Postpartum Planning: Plans to try breastfeeding, interested in postpartum tubal  -- Delivery Planning: No indication for early IOL at this time. To be discussed continually  -- Planning to do at next visit: follow up growth US    # Initial concern for IUGR-- follow up imaging not consistent with IUGR.  -- US from 2021: overall EFW 5%ile (AC 5%ile, BPD 9%ile, HC 15%ile, FL 8%ile)   : normal umbilical artery dopplers (2.5-3.1)  -- Follow up MFM scan on  2021: overall EFW 25%ile, (AC 27%ile)  -- Serial Growth US q 3-4 weeks              Next due 2021: ordered  -- As she had reassuring follow up  US with MFM, will hold off on conitinuing  testing at this time    -- s/p betamethasone given  and       # Rh negative (A neg)  -- Rhogam given      # History of Maternal Hodgkin's Lymphoma: s/p chemotherapy and surgery to remove groin lymph nodes     # History of previous hydronephrosis from kidney stones and nephrostomy tube placement during last two pregnancies      # Hematuria- resolved  -- Suspected recurrent kidney stone  -- Renal US 3/19 shows mild dilation of left renal pelvis, no dilation on the right.   -- Creatinine 3/19: 0.61, 3/25: 0.53     # Abnormal pap  - [place] performed 2021 with low-grade changes suspected at 12 o'clock position. As it is low-grade in appearance and she is pregnant, discussed repeat colposcopy, Pap and HPV testing postpartum.

## 2021-05-27 ENCOUNTER — PRENATAL OFFICE VISIT (OUTPATIENT)
Dept: OBGYN | Facility: OTHER | Age: 31
End: 2021-05-27
Attending: STUDENT IN AN ORGANIZED HEALTH CARE EDUCATION/TRAINING PROGRAM
Payer: COMMERCIAL

## 2021-05-27 VITALS
SYSTOLIC BLOOD PRESSURE: 120 MMHG | WEIGHT: 142.2 LBS | DIASTOLIC BLOOD PRESSURE: 82 MMHG | HEART RATE: 86 BPM | BODY MASS INDEX: 25.19 KG/M2

## 2021-05-27 DIAGNOSIS — Z85.72 HISTORY OF LYMPHOMA: ICD-10-CM

## 2021-05-27 DIAGNOSIS — O26.899 RH NEGATIVE STATE IN ANTEPARTUM PERIOD: Primary | ICD-10-CM

## 2021-05-27 DIAGNOSIS — O09.90 HIGH-RISK PREGNANCY, UNSPECIFIED TRIMESTER: ICD-10-CM

## 2021-05-27 DIAGNOSIS — R87.810 ASCUS WITH POSITIVE HIGH RISK HPV CERVICAL: ICD-10-CM

## 2021-05-27 DIAGNOSIS — R87.610 ASCUS WITH POSITIVE HIGH RISK HPV CERVICAL: ICD-10-CM

## 2021-05-27 DIAGNOSIS — Z67.91 RH NEGATIVE STATE IN ANTEPARTUM PERIOD: Primary | ICD-10-CM

## 2021-05-27 PROCEDURE — 99207 PR OB VISIT-NO CHARGE - GICH ONLY: CPT | Performed by: STUDENT IN AN ORGANIZED HEALTH CARE EDUCATION/TRAINING PROGRAM

## 2021-05-27 ASSESSMENT — PAIN SCALES - GENERAL: PAINLEVEL: NO PAIN (0)

## 2021-05-27 NOTE — NURSING NOTE
Pt presents to clinic today for prenatal care 29w2d. Pt denies any contractions, bleeding, or leakage of fluid at this time. Pt states baby is moving a lot.      Medication Reconciliation: complete  Simin Hannah LPN

## 2021-06-09 ENCOUNTER — PRENATAL OFFICE VISIT (OUTPATIENT)
Dept: OBGYN | Facility: OTHER | Age: 31
End: 2021-06-09
Attending: STUDENT IN AN ORGANIZED HEALTH CARE EDUCATION/TRAINING PROGRAM
Payer: COMMERCIAL

## 2021-06-09 VITALS
RESPIRATION RATE: 20 BRPM | HEART RATE: 80 BPM | BODY MASS INDEX: 27.03 KG/M2 | TEMPERATURE: 97.9 F | OXYGEN SATURATION: 96 % | SYSTOLIC BLOOD PRESSURE: 126 MMHG | WEIGHT: 146.9 LBS | HEIGHT: 62 IN | DIASTOLIC BLOOD PRESSURE: 80 MMHG

## 2021-06-09 DIAGNOSIS — O99.891 CALCULUS OF KIDNEY AFFECTING PREGNANCY IN SECOND TRIMESTER: ICD-10-CM

## 2021-06-09 DIAGNOSIS — Z67.91 RH NEGATIVE STATE IN ANTEPARTUM PERIOD: Primary | ICD-10-CM

## 2021-06-09 DIAGNOSIS — N20.0 CALCULUS OF KIDNEY AFFECTING PREGNANCY IN SECOND TRIMESTER: ICD-10-CM

## 2021-06-09 DIAGNOSIS — O26.899 RH NEGATIVE STATE IN ANTEPARTUM PERIOD: Primary | ICD-10-CM

## 2021-06-09 DIAGNOSIS — R87.810 ASCUS WITH POSITIVE HIGH RISK HPV CERVICAL: ICD-10-CM

## 2021-06-09 DIAGNOSIS — R87.610 ASCUS WITH POSITIVE HIGH RISK HPV CERVICAL: ICD-10-CM

## 2021-06-09 DIAGNOSIS — Z85.72 HISTORY OF LYMPHOMA: ICD-10-CM

## 2021-06-09 DIAGNOSIS — O09.90 HIGH-RISK PREGNANCY, UNSPECIFIED TRIMESTER: ICD-10-CM

## 2021-06-09 PROCEDURE — 99207 PR OB VISIT-NO CHARGE - GICH ONLY: CPT | Performed by: STUDENT IN AN ORGANIZED HEALTH CARE EDUCATION/TRAINING PROGRAM

## 2021-06-09 PROCEDURE — G0463 HOSPITAL OUTPT CLINIC VISIT: HCPCS

## 2021-06-09 ASSESSMENT — MIFFLIN-ST. JEOR: SCORE: 1329.58

## 2021-06-09 ASSESSMENT — PATIENT HEALTH QUESTIONNAIRE - PHQ9: SUM OF ALL RESPONSES TO PHQ QUESTIONS 1-9: 0

## 2021-06-09 ASSESSMENT — PAIN SCALES - GENERAL: PAINLEVEL: NO PAIN (0)

## 2021-06-09 NOTE — NURSING NOTE
"Chief Complaint   Patient presents with     Prenatal Care     31 wk1d     OB check.  Initial /80 (BP Location: Right arm, Patient Position: Sitting, Cuff Size: Adult Regular)   Pulse 80   Temp 97.9  F (36.6  C) (Tympanic)   Resp 20   Ht 1.575 m (5' 2\")   Wt 66.6 kg (146 lb 14.4 oz)   LMP 11/03/2020   SpO2 96%   BMI 26.87 kg/m   Estimated body mass index is 26.87 kg/m  as calculated from the following:    Height as of this encounter: 1.575 m (5' 2\").    Weight as of this encounter: 66.6 kg (146 lb 14.4 oz).  Medication Reconciliation: complete    Estefani Puentes LPN  "

## 2021-06-09 NOTE — PROGRESS NOTES
"Return OB Visit    S: Noemí is feeling well today. She has no acute concerns. Denies leaking of fluid, vaginal bleeding, painful contractions. Notes fetal movements.    O:   /80 (BP Location: Right arm, Patient Position: Sitting, Cuff Size: Adult Regular)   Pulse 80   Temp 97.9  F (36.6  C) (Tympanic)   Resp 20   Ht 1.575 m (5' 2\")   Wt 66.6 kg (146 lb 14.4 oz)   LMP 2020   SpO2 96%   BMI 26.87 kg/m      Gen: Well-appearing, NAD  See OB Flowsheet    FH 30 cm   bpm    Assessment:  Ms. Noemí Garcia is a 30 year old yo  here for OB follow up. She is currently 31w1d. Her pregnancy is complicated by personal history of Hodgkin's Lymphoma, ASCUS HPV+ pap smear, history of kidney stones in prior pregnancies.     Plan:     # Routine Prenatal Care  -- Datin week US [place]: 2021  -- PNLs:               A neg blood type: ab screen neg              RPR nr              Hep B S Ag neg              Hep C neg              Rubella Immune              HIV neg              ABO/Rh type              Antibody Screen              Urine culture: negative              GC/Chlam: neg     -- Genetic Screening: declines  -- Anatomy US: EFW 19%ile, CL 3.4 cm, MARQUITA 14.5cm, Normal anatomy  -- Immunizations: Declines flu. Tdap , Rhogam   -- 1 hr GTT: 92  -- 3rd TM labs including CBC, RPR: RPR nr, Hgb 13.3  -- GBS: Planned for 36 weeks  -- Postpartum Planning: Plans to try breastfeeding, interested in postpartum tubal  -- Delivery Planning: No indication for early IOL at this time. To be discussed continually  -- Planning to do at next visit: follow up growth US     # Initial concern for IUGR-- follow up imaging not consistent with IUGR.  -- US from 2021: overall EFW 5%ile (AC 5%ile, BPD 9%ile, HC 15%ile, FL 8%ile)              : normal umbilical artery dopplers (2.5-3.1)  -- Follow up MFM scan on  2021: overall EFW 25%ile, (AC 27%ile)  -- Serial Growth US q 3-4 weeks              6/15: " scheduled  -- As she had reassuring follow up US with MFM, will hold off on conitinuing  testing at this time    -- s/p betamethasone given  and       # Rh negative (A neg)  -- Rhogam given      # History of Maternal Hodgkin's Lymphoma: s/p chemotherapy and surgery to remove groin lymph nodes     # History of previous hydronephrosis from kidney stones and nephrostomy tube placement during last two pregnancies      # Hematuria- resolved  -- Suspected recurrent kidney stone  -- Renal US 3/19 shows mild dilation of left renal pelvis, no dilation on the right.   -- Creatinine 3/19: 0.61, 3/25: 0.53     # Abnormal pap  - [place] performed 2021 with low-grade changes suspected at 12 o'clock position. As it is low-grade in appearance and she is pregnant, discussed repeat colposcopy, Pap and HPV testing postpartum.

## 2021-06-15 ENCOUNTER — OFFICE VISIT (OUTPATIENT)
Dept: MATERNAL FETAL MEDICINE | Facility: CLINIC | Age: 31
End: 2021-06-15
Attending: STUDENT IN AN ORGANIZED HEALTH CARE EDUCATION/TRAINING PROGRAM
Payer: COMMERCIAL

## 2021-06-15 ENCOUNTER — HOSPITAL ENCOUNTER (OUTPATIENT)
Dept: ULTRASOUND IMAGING | Facility: CLINIC | Age: 31
End: 2021-06-15
Attending: STUDENT IN AN ORGANIZED HEALTH CARE EDUCATION/TRAINING PROGRAM
Payer: COMMERCIAL

## 2021-06-15 ENCOUNTER — HOSPITAL ENCOUNTER (OUTPATIENT)
Dept: ULTRASOUND IMAGING | Facility: OTHER | Age: 31
Discharge: HOME | End: 2021-06-15
Attending: STUDENT IN AN ORGANIZED HEALTH CARE EDUCATION/TRAINING PROGRAM | Admitting: STUDENT IN AN ORGANIZED HEALTH CARE EDUCATION/TRAINING PROGRAM
Payer: COMMERCIAL

## 2021-06-15 DIAGNOSIS — Z67.91 RH NEGATIVE STATE IN ANTEPARTUM PERIOD: ICD-10-CM

## 2021-06-15 DIAGNOSIS — Z3A.32 32 WEEKS GESTATION OF PREGNANCY: ICD-10-CM

## 2021-06-15 DIAGNOSIS — O36.5990 PREGNANCY AFFECTED BY FETAL GROWTH RESTRICTION: Primary | ICD-10-CM

## 2021-06-15 DIAGNOSIS — O26.899 RH NEGATIVE STATE IN ANTEPARTUM PERIOD: ICD-10-CM

## 2021-06-15 DIAGNOSIS — O09.90 HIGH-RISK PREGNANCY, UNSPECIFIED TRIMESTER: ICD-10-CM

## 2021-06-15 PROCEDURE — 76805 OB US >/= 14 WKS SNGL FETUS: CPT

## 2021-06-15 NOTE — PROGRESS NOTES
Type of service:    Telemedicine Office Visit for fetal ultrasound    Date of service:     Date: 06/15/21     Time service began:    12:00 PM  Time service ended:    1:00 PM    Reason:      : Lack of available service in patient area    Description of basis or telemedicine appropriateness:     Consultation provided at the request of Dr. Andino for advice regarding the diagnosis and treatment of this patient's pregnancy complicated by FGR.  The patient's condition can be safely assessed via telemedicine.    The Mode of Transmission:    Secure interactive audio and visual telecommunication system (Video Guidance)    Location of originating and distant sites:      Originating site:   [place], [place]    Distant site:    [place], [place]    Lucia Garcia MD

## 2021-06-16 ENCOUNTER — TELEPHONE (OUTPATIENT)
Dept: OBGYN | Facility: OTHER | Age: 31
End: 2021-06-16

## 2021-06-16 DIAGNOSIS — O36.5920 POOR FETAL GROWTH AFFECTING MANAGEMENT OF MOTHER IN SECOND TRIMESTER, SINGLE OR UNSPECIFIED FETUS: Primary | ICD-10-CM

## 2021-06-16 DIAGNOSIS — O36.5930 POOR FETAL GROWTH AFFECTING MANAGEMENT OF MOTHER IN THIRD TRIMESTER, SINGLE OR UNSPECIFIED FETUS: Primary | ICD-10-CM

## 2021-06-16 DIAGNOSIS — O09.90 HIGH-RISK PREGNANCY, UNSPECIFIED TRIMESTER: ICD-10-CM

## 2021-06-16 NOTE — TELEPHONE ENCOUNTER
I called Noemí this morning and relayed the diagnosis of IUGR after her MFM US performed on 6/15. We discussed the recommendation of weekly BPPs with UAD and q3 week growth US. All of these have been ordered and our team will reach out to schedule them with her. She has her next follow up visit with me next week.    She is s/p BMZ course a few weeks ago.     Delivery planning will be 38-39 weeks based off UAD results.    NATALIE GREGG MD on 6/16/2021 at 1:44 PM

## 2021-06-17 ENCOUNTER — HOSPITAL ENCOUNTER (OUTPATIENT)
Dept: ULTRASOUND IMAGING | Facility: OTHER | Age: 31
Discharge: HOME | End: 2021-06-17
Attending: STUDENT IN AN ORGANIZED HEALTH CARE EDUCATION/TRAINING PROGRAM | Admitting: STUDENT IN AN ORGANIZED HEALTH CARE EDUCATION/TRAINING PROGRAM
Payer: COMMERCIAL

## 2021-06-17 DIAGNOSIS — O09.90 HIGH-RISK PREGNANCY, UNSPECIFIED TRIMESTER: ICD-10-CM

## 2021-06-17 DIAGNOSIS — O36.5930 POOR FETAL GROWTH AFFECTING MANAGEMENT OF MOTHER IN THIRD TRIMESTER, SINGLE OR UNSPECIFIED FETUS: ICD-10-CM

## 2021-06-17 PROCEDURE — 76819 FETAL BIOPHYS PROFIL W/O NST: CPT

## 2021-06-24 ENCOUNTER — HOSPITAL ENCOUNTER (OUTPATIENT)
Dept: ULTRASOUND IMAGING | Facility: OTHER | Age: 31
End: 2021-06-24
Attending: STUDENT IN AN ORGANIZED HEALTH CARE EDUCATION/TRAINING PROGRAM
Payer: COMMERCIAL

## 2021-06-24 ENCOUNTER — PRENATAL OFFICE VISIT (OUTPATIENT)
Dept: OBGYN | Facility: OTHER | Age: 31
End: 2021-06-24
Attending: STUDENT IN AN ORGANIZED HEALTH CARE EDUCATION/TRAINING PROGRAM
Payer: COMMERCIAL

## 2021-06-24 VITALS
BODY MASS INDEX: 27.07 KG/M2 | WEIGHT: 148 LBS | HEART RATE: 74 BPM | DIASTOLIC BLOOD PRESSURE: 64 MMHG | SYSTOLIC BLOOD PRESSURE: 116 MMHG

## 2021-06-24 DIAGNOSIS — O36.5930 POOR FETAL GROWTH AFFECTING MANAGEMENT OF MOTHER IN THIRD TRIMESTER, SINGLE OR UNSPECIFIED FETUS: ICD-10-CM

## 2021-06-24 DIAGNOSIS — R87.810 ASCUS WITH POSITIVE HIGH RISK HPV CERVICAL: ICD-10-CM

## 2021-06-24 DIAGNOSIS — R87.610 ASCUS WITH POSITIVE HIGH RISK HPV CERVICAL: ICD-10-CM

## 2021-06-24 DIAGNOSIS — O09.90 HIGH-RISK PREGNANCY, UNSPECIFIED TRIMESTER: ICD-10-CM

## 2021-06-24 DIAGNOSIS — Z85.72 HISTORY OF LYMPHOMA: ICD-10-CM

## 2021-06-24 DIAGNOSIS — O09.90 HIGH-RISK PREGNANCY, UNSPECIFIED TRIMESTER: Primary | ICD-10-CM

## 2021-06-24 DIAGNOSIS — O99.891 CALCULUS OF KIDNEY AFFECTING PREGNANCY IN SECOND TRIMESTER: ICD-10-CM

## 2021-06-24 DIAGNOSIS — N20.0 CALCULUS OF KIDNEY AFFECTING PREGNANCY IN SECOND TRIMESTER: ICD-10-CM

## 2021-06-24 PROCEDURE — 76819 FETAL BIOPHYS PROFIL W/O NST: CPT

## 2021-06-24 PROCEDURE — 99207 PR OB VISIT-NO CHARGE - GICH ONLY: CPT | Performed by: STUDENT IN AN ORGANIZED HEALTH CARE EDUCATION/TRAINING PROGRAM

## 2021-06-24 ASSESSMENT — PAIN SCALES - GENERAL: PAINLEVEL: NO PAIN (0)

## 2021-06-24 NOTE — NURSING NOTE
Pt presents to clinic today for prenatal care 33w2d. Pt denies any bleeding, or leakage of fluid at this time. Pt states baby is moving good.        Medication Reconciliation: complete  Simin Hannah LPN

## 2021-06-24 NOTE — PROGRESS NOTES
Return OB Visit    S: Noemí is feeling well today. She has no acute concerns. Denies leaking of fluid, vaginal bleeding, painful contractions. Notes fetal movements.BPP reassuring today as were UADs    O:   /64   Pulse 74   Wt 67.1 kg (148 lb)   LMP 2020   BMI 27.07 kg/m      Gen: Well-appearing, NAD  BPP today   FHT 165bpm  UAD 2.47-3.0 (normal for this age is 2.17-3.85)    Assessment:  Ms. Noemí Garcia is a 30 year old yo  here for OB follow up. She is currently 33w2d. Her pregnancy is complicated by personal history of Hodgkin's Lymphoma, ASCUS HPV+ pap smear, history of kidney stones in prior pregnancies.     Plan:     # Routine Prenatal Care  -- Datin week US [place]: 2021  -- PNLs:               A neg blood type: ab screen neg              RPR nr              Hep B S Ag neg              Hep C neg              Rubella Immune              HIV neg              ABO/Rh type              Antibody Screen              Urine culture: negative              GC/Chlam: neg     -- Genetic Screening: declines  -- Anatomy US: EFW 19%ile, CL 3.4 cm, MARQUITA 14.5cm, Normal anatomy  -- Immunizations: Declines flu. Tdap , Rhogam   -- 1 hr GTT: 92  -- 3rd TM labs including CBC, RPR: RPR nr, Hgb 13.3  -- GBS: Planned for 36 weeks  -- Postpartum Planning: Plans to try breastfeeding, interested in postpartum tubal  -- Delivery Planning: No indication for early IOL at this time. To be discussed continually  -- Planning to do at next visit: follow up growth US     # IUGR  -- US 2021: overall EFW 5%ile (AC 5%ile, BPD 9%ile, HC 15%ile, FL 8%ile)              : normal umbilical artery dopplers (2.5-3.1)  -- Follow up MFM scan on 2021: overall EFW 25%ile, (AC 27%ile)  -- MFM scan 6/15/21: overall EFW 9%ile, AC17%ile)   UAD 1.16  -- Serial Growth US q 3-4 weeks            Next scheduled for   -- s/p betamethasone given  and    -- Weekly BPPs: ordered   Today      # Rh  negative (A neg)  -- Rhogam given 5/18     # History of Maternal Hodgkin's Lymphoma: s/p chemotherapy and surgery to remove groin lymph nodes     # History of previous hydronephrosis from kidney stones and nephrostomy tube placement during last two pregnancies      # Hematuria- resolved  -- Suspected recurrent kidney stone  -- Renal US 3/19 shows mild dilation of left renal pelvis, no dilation on the right.   -- Creatinine 3/19: 0.61, 3/25: 0.53     # Abnormal pap  - [place] performed 2/2/2021 with low-grade changes suspected at 12 o'clock position. As it is low-grade in appearance and she is pregnant, discussed repeat colposcopy, Pap and HPV testing postpartum.

## 2021-06-30 ENCOUNTER — HOSPITAL ENCOUNTER (OUTPATIENT)
Dept: ULTRASOUND IMAGING | Facility: OTHER | Age: 31
Discharge: HOME | End: 2021-06-30
Attending: STUDENT IN AN ORGANIZED HEALTH CARE EDUCATION/TRAINING PROGRAM | Admitting: STUDENT IN AN ORGANIZED HEALTH CARE EDUCATION/TRAINING PROGRAM
Payer: COMMERCIAL

## 2021-06-30 DIAGNOSIS — O36.5930 POOR FETAL GROWTH AFFECTING MANAGEMENT OF MOTHER IN THIRD TRIMESTER, SINGLE OR UNSPECIFIED FETUS: ICD-10-CM

## 2021-06-30 DIAGNOSIS — O09.90 HIGH-RISK PREGNANCY, UNSPECIFIED TRIMESTER: ICD-10-CM

## 2021-06-30 PROCEDURE — 76819 FETAL BIOPHYS PROFIL W/O NST: CPT

## 2021-07-06 NOTE — PROGRESS NOTES
Return OB Visit    S: Noemí is feeling well today. She has no acute concerns. Denies leaking of fluid, vaginal bleeding, painful contractions. Notes fetal movements.    O:  /68   Pulse 72   Wt 67.5 kg (148 lb 12.8 oz)   LMP 2020   BMI 27.22 kg/m      Gen: Well-appearing, NAD    BPP 8/8 today, UAD WNL    Assessment:  Ms. Noemí Garcia is a 30 year old yo  here for OB follow up. She is currently 35w2d. Her pregnancy is complicated by personal history of Hodgkin's Lymphoma, ASCUS HPV+ pap smear, history of kidney stones in prior pregnancies.     Plan:     # Routine Prenatal Care  -- Datin week US [place]: 2021  -- PNLs:               A neg blood type: ab screen neg              RPR nr              Hep B S Ag neg              Hep C neg              Rubella Immune              HIV neg              ABO/Rh type              Antibody Screen              Urine culture: negative              GC/Chlam: neg     -- Genetic Screening: declines  -- Anatomy US: EFW 19%ile, CL 3.4 cm, MARQUITA 14.5cm, Normal anatomy  -- Immunizations: Declines flu. Tdap , Rhogam   -- 1 hr GTT: 92  -- 3rd TM labs including CBC, RPR: RPR nr, Hgb 13.3  -- GBS: collected today  -- Postpartum Planning: Plans to try breastfeeding, interested in postpartum tubal  -- Delivery Planning:  Recommendations per the Society of Maternal Fetal medicine:For estimated fetal weight between the 3rd and 10th percentile and normal umbilical artery Doppler, delivery is suggested at 38 0/7 and 39 0/7 weeks of gestation.  -- Planning to do at next visit: follow up growth US, follow up GBS results     # IUGR  -- US 2021: overall EFW 5%ile (AC 5%ile, BPD 9%ile, HC 15%ile, FL 8%ile)              : normal umbilical artery dopplers (2.5-3.1)  -- Follow up MFM scan on 2021: overall EFW 25%ile, (AC 27%ile)  -- MFM scan 6/15/21: overall EFW 9%ile, AC17%ile)              UAD 1.16  -- Serial Growth US q 3-4 weeks              Next scheduled  for 7/13  -- s/p betamethasone given 5/19 and 5/20   -- Weekly BPPs with UAD: ordered              Today 8/8 (UAD WNL)   Recommendations per the Society of Maternal Fetal medicine:For estimated fetal weight between the 3rd and 10th percentile and normal umbilical artery Doppler, delivery is suggested at 38 0/7 and 39 0/7 weeks of gestation. In cases of isolated fetal growth restriction with an estimated fetal weight less than the third percentile, delivery is recommended at 37 0/7 weeks of gestation or at diagnosis if diagnosed earlier 137. Earlier delivery is indicated in cases of absent or reverse umbilical artery flow    # Rh negative (A neg)  -- Rhogam given 5/18     # History of Maternal Hodgkin's Lymphoma: s/p chemotherapy and surgery to remove groin lymph nodes     # History of previous hydronephrosis from kidney stones and nephrostomy tube placement during last two pregnancies      # Hematuria- resolved  -- Suspected recurrent kidney stone  -- Renal US 3/19 shows mild dilation of left renal pelvis, no dilation on the right.   -- Creatinine 3/19: 0.61, 3/25: 0.53     # Abnormal pap  - [place] performed 2/2/2021 with low-grade changes suspected at 12 o'clock position. As it is low-grade in appearance and she is pregnant, discussed repeat colposcopy, Pap and HPV testing postpartum.

## 2021-07-08 ENCOUNTER — HOSPITAL ENCOUNTER (OUTPATIENT)
Dept: ULTRASOUND IMAGING | Facility: OTHER | Age: 31
End: 2021-07-08
Attending: STUDENT IN AN ORGANIZED HEALTH CARE EDUCATION/TRAINING PROGRAM
Payer: COMMERCIAL

## 2021-07-08 ENCOUNTER — PRENATAL OFFICE VISIT (OUTPATIENT)
Dept: OBGYN | Facility: OTHER | Age: 31
End: 2021-07-08
Attending: STUDENT IN AN ORGANIZED HEALTH CARE EDUCATION/TRAINING PROGRAM
Payer: COMMERCIAL

## 2021-07-08 VITALS
DIASTOLIC BLOOD PRESSURE: 68 MMHG | BODY MASS INDEX: 27.22 KG/M2 | HEART RATE: 72 BPM | WEIGHT: 148.8 LBS | SYSTOLIC BLOOD PRESSURE: 102 MMHG

## 2021-07-08 DIAGNOSIS — O36.5930 POOR FETAL GROWTH AFFECTING MANAGEMENT OF MOTHER IN THIRD TRIMESTER, SINGLE OR UNSPECIFIED FETUS: ICD-10-CM

## 2021-07-08 DIAGNOSIS — Z85.72 HISTORY OF LYMPHOMA: ICD-10-CM

## 2021-07-08 DIAGNOSIS — O09.90 HIGH-RISK PREGNANCY, UNSPECIFIED TRIMESTER: Primary | ICD-10-CM

## 2021-07-08 DIAGNOSIS — R87.810 ASCUS WITH POSITIVE HIGH RISK HPV CERVICAL: ICD-10-CM

## 2021-07-08 DIAGNOSIS — R87.610 ASCUS WITH POSITIVE HIGH RISK HPV CERVICAL: ICD-10-CM

## 2021-07-08 DIAGNOSIS — O09.90 HIGH-RISK PREGNANCY, UNSPECIFIED TRIMESTER: ICD-10-CM

## 2021-07-08 PROCEDURE — 87081 CULTURE SCREEN ONLY: CPT | Mod: ZL | Performed by: STUDENT IN AN ORGANIZED HEALTH CARE EDUCATION/TRAINING PROGRAM

## 2021-07-08 PROCEDURE — 99207 PR OB VISIT-NO CHARGE - GICH ONLY: CPT | Performed by: STUDENT IN AN ORGANIZED HEALTH CARE EDUCATION/TRAINING PROGRAM

## 2021-07-08 PROCEDURE — 76819 FETAL BIOPHYS PROFIL W/O NST: CPT

## 2021-07-08 ASSESSMENT — PAIN SCALES - GENERAL: PAINLEVEL: NO PAIN (0)

## 2021-07-08 NOTE — NURSING NOTE
Pt presents to clinic today for prenatal care 35w2d. Pt denies any bleeding, or leakage of fluid at this time. States pt is moving good and has been having some contractions that come and go.      Medication Reconciliation: complete  Simin Hannah LPN

## 2021-07-10 LAB
BACTERIA SPEC CULT: NORMAL
SPECIMEN SOURCE: NORMAL

## 2021-07-13 ENCOUNTER — HOSPITAL ENCOUNTER (OUTPATIENT)
Dept: ULTRASOUND IMAGING | Facility: OTHER | Age: 31
End: 2021-07-13
Attending: STUDENT IN AN ORGANIZED HEALTH CARE EDUCATION/TRAINING PROGRAM
Payer: COMMERCIAL

## 2021-07-13 ENCOUNTER — PRENATAL OFFICE VISIT (OUTPATIENT)
Dept: OBGYN | Facility: OTHER | Age: 31
End: 2021-07-13
Attending: OBSTETRICS & GYNECOLOGY
Payer: COMMERCIAL

## 2021-07-13 VITALS
WEIGHT: 148 LBS | DIASTOLIC BLOOD PRESSURE: 60 MMHG | HEART RATE: 88 BPM | BODY MASS INDEX: 27.07 KG/M2 | SYSTOLIC BLOOD PRESSURE: 118 MMHG

## 2021-07-13 DIAGNOSIS — O36.5930 POOR FETAL GROWTH AFFECTING MANAGEMENT OF MOTHER IN THIRD TRIMESTER, SINGLE OR UNSPECIFIED FETUS: ICD-10-CM

## 2021-07-13 DIAGNOSIS — O09.90 HIGH-RISK PREGNANCY, UNSPECIFIED TRIMESTER: Primary | ICD-10-CM

## 2021-07-13 PROCEDURE — 76816 OB US FOLLOW-UP PER FETUS: CPT

## 2021-07-13 PROCEDURE — 99207 PR OB VISIT-NO CHARGE - GICH ONLY: CPT | Performed by: OBSTETRICS & GYNECOLOGY

## 2021-07-13 PROCEDURE — 76819 FETAL BIOPHYS PROFIL W/O NST: CPT

## 2021-07-13 ASSESSMENT — PAIN SCALES - GENERAL: PAINLEVEL: NO PAIN (0)

## 2021-07-13 NOTE — NURSING NOTE
FOOD SECURITY SCREENING QUESTIONS  Hunger Vital Signs:  Within the past 12 months we worried whether our food would run out before we got money to buy more. Never  Within the past 12 months the food we bought just didn't last and we didn't have money to get more. Never  Arcelia Lucero LPN 7/13/2021 1:45 PM    Chief Complaint   Patient presents with     Prenatal Care     36w0d       Medication Reconciliation: completed   Arcelia Lucero LPN  7/13/2021 1:45 PM

## 2021-07-13 NOTE — PROGRESS NOTES
Return OB Visit    S: Patient is feeling okay. Rare ctx, no VB or LOF. +FM.    O: /60 (BP Location: Right arm, Patient Position: Sitting, Cuff Size: Adult Regular)   Pulse 88   Wt 67.1 kg (148 lb)   LMP 2020   Breastfeeding No   BMI 27.07 kg/m    Gen: Well-appearing, NAD  See OB Flowsheet    BPP     A/P:  Noemí Garcia is a 30 year old  at 36w0d by LMP c/w 9w US, here for return OB visit.  Hx of Hodgkin's lymphoma  Hx of kidney stones in pregnancy  ASCUS, HPV+ pap: needs colp PP  IUGR: Growth US 2021 2330g (5%ile), normal MARQUITA and UA dopplers    PNC: Rh negative s/p Rhogam, Rubella immune, GCT 92, GBS negative  Genetics: declined  Imaging: dating US at 9wk, normal anatomy survey.   Immunizations: s/p Tdap, declined flu  RTC weekly    Amie Andrew MD  OB/GYN  2021 2:10 PM

## 2021-07-21 ENCOUNTER — HOSPITAL ENCOUNTER (OUTPATIENT)
Dept: ULTRASOUND IMAGING | Facility: OTHER | Age: 31
End: 2021-07-21
Attending: STUDENT IN AN ORGANIZED HEALTH CARE EDUCATION/TRAINING PROGRAM
Payer: COMMERCIAL

## 2021-07-21 ENCOUNTER — PRENATAL OFFICE VISIT (OUTPATIENT)
Dept: OBGYN | Facility: OTHER | Age: 31
End: 2021-07-21
Attending: STUDENT IN AN ORGANIZED HEALTH CARE EDUCATION/TRAINING PROGRAM
Payer: COMMERCIAL

## 2021-07-21 VITALS
SYSTOLIC BLOOD PRESSURE: 102 MMHG | BODY MASS INDEX: 27.5 KG/M2 | WEIGHT: 150.36 LBS | HEART RATE: 68 BPM | DIASTOLIC BLOOD PRESSURE: 70 MMHG

## 2021-07-21 DIAGNOSIS — R87.610 ASCUS WITH POSITIVE HIGH RISK HPV CERVICAL: ICD-10-CM

## 2021-07-21 DIAGNOSIS — O09.90 HIGH-RISK PREGNANCY, UNSPECIFIED TRIMESTER: ICD-10-CM

## 2021-07-21 DIAGNOSIS — O36.5930 POOR FETAL GROWTH AFFECTING MANAGEMENT OF MOTHER IN THIRD TRIMESTER, SINGLE OR UNSPECIFIED FETUS: ICD-10-CM

## 2021-07-21 DIAGNOSIS — R87.810 ASCUS WITH POSITIVE HIGH RISK HPV CERVICAL: ICD-10-CM

## 2021-07-21 DIAGNOSIS — Z85.72 HISTORY OF LYMPHOMA: ICD-10-CM

## 2021-07-21 DIAGNOSIS — O09.90 HIGH-RISK PREGNANCY, UNSPECIFIED TRIMESTER: Primary | ICD-10-CM

## 2021-07-21 PROCEDURE — 76819 FETAL BIOPHYS PROFIL W/O NST: CPT

## 2021-07-21 PROCEDURE — 99207 PR OB VISIT-NO CHARGE - GICH ONLY: CPT | Performed by: STUDENT IN AN ORGANIZED HEALTH CARE EDUCATION/TRAINING PROGRAM

## 2021-07-21 ASSESSMENT — PAIN SCALES - GENERAL: PAINLEVEL: NO PAIN (0)

## 2021-07-21 NOTE — PROGRESS NOTES
Return OB Visit    S: Noemí is feeling well today. She has no acute concerns. Denies leaking of fluid, vaginal bleeding, painful contractions. Notes fetal movements. BPP  today. UAD normal.    O: /70   Pulse 68   Wt 68.2 kg (150 lb 5.8 oz)   LMP 2020   BMI 27.50 kg/m    Gen: Well-appearing, NAD    BPP 8  MARQUITA 24.9 cm    Assessment:  Ms. Noemí Garcia is a 30 year old yo  here for OB follow up. She is currently 37w1d. Her pregnancy is complicated by personal history of Hodgkin's Lymphoma, ASCUS HPV+ pap smear, history of kidney stones in prior pregnancies, IUGR.     Plan:     # Routine Prenatal Care  -- Datin week US [place]: 2021  -- PNLs:               A neg blood type: ab screen neg              RPR nr              Hep B S Ag neg              Hep C neg              Rubella Immune              HIV neg              ABO/Rh type              Antibody Screen              Urine culture: negative              GC/Chlam: neg     -- Genetic Screening: declines  -- Anatomy US: EFW 19%ile, CL 3.4 cm, MARQUITA 14.5cm, Normal anatomy  -- Immunizations: Declines flu. Tdap , Rhogam   -- 1 hr GTT: 92  -- 3rd TM labs including CBC, RPR: RPR nr, Hgb 13.3  -- GBS: negative  -- Postpartum Planning: Plans to try breastfeeding, interested in postpartum tubal  -- Delivery Planning:  Recommendations per the Society of Maternal Fetal medicine:For estimated fetal weight between the 3rd and 10th percentile and normal umbilical artery Doppler, delivery is suggested at 38 0/7 and 39 0/7 weeks of gestation.: IOL planning for   -- Planning to do at next visit: SVE, follow up BPP and MFM scan     # IUGR  -- US 2021: overall EFW 5%ile (AC 5%ile, BPD 9%ile, HC 15%ile, FL 8%ile)              : normal umbilical artery dopplers (2.5-3.1)  -- Follow up MFM scan on 2021: overall EFW 25%ile, (AC 27%ile)  -- MFM scan 6/15/21: overall EFW 9%ile, AC17%ile)              UAD 1.16  -- Serial Growth US q 3-4  weeks              7/13: 2330 grams, EFW 5%[place] (AC 14%ile, HC 11%ile)  -- s/p betamethasone given 5/19 and 5/20   -- Weekly BPPs with UAD: ordered              Today 8/8 (UAD WNL)   Recommendations per the Society of Maternal Fetal medicine:For estimated fetal weight between the 3rd and 10th percentile and normal umbilical artery Doppler, delivery is suggested at 38 0/7 and 39 0/7 weeks of gestation. In cases of isolated fetal growth restriction with an estimated fetal weight less than the third percentile, delivery is recommended at 37 0/7 weeks of gestation or at diagnosis if diagnosed earlier 137. Earlier delivery is indicated in cases of absent or reverse umbilical artery flow     * One more BPP early next week, MFM scan on 7/27, plan for IOL on 7/29 with overnight ripening on 7/28 with cook catheter balloon    # Polyhydramnios  -- MARQUITA 24.9 cm on 7/21    # Rh negative (A neg)  -- Rhogam given 5/18     # History of Maternal Hodgkin's Lymphoma: s/p chemotherapy and surgery to remove groin lymph nodes     # History of previous hydronephrosis from kidney stones and nephrostomy tube placement during last two pregnancies      # Hematuria- resolved  -- Suspected recurrent kidney stone  -- Renal US 3/19 shows mild dilation of left renal pelvis, no dilation on the right.   -- Creatinine 3/19: 0.61, 3/25: 0.53     # Abnormal pap  - [place] performed 2/2/2021 with low-grade changes suspected at 12 o'clock position. As it is low-grade in appearance and she is pregnant, discussed repeat colposcopy, Pap and HPV testing postpartum.

## 2021-07-21 NOTE — NURSING NOTE
Pt presents to clinic today for prenatal care 37w1d. Pt denies any bleeding, or leakage of fluid at this time. Pt states the last two days she has slowed down, and denies any contractions.      Medication Reconciliation: complete  Simin Hannah LPN

## 2021-07-24 ENCOUNTER — APPOINTMENT (OUTPATIENT)
Dept: ULTRASOUND IMAGING | Facility: OTHER | Age: 31
End: 2021-07-24
Attending: PHYSICIAN ASSISTANT
Payer: COMMERCIAL

## 2021-07-24 ENCOUNTER — HOSPITAL ENCOUNTER (EMERGENCY)
Facility: OTHER | Age: 31
Discharge: HOME | End: 2021-07-24
Attending: PHYSICIAN ASSISTANT | Admitting: PHYSICIAN ASSISTANT
Payer: COMMERCIAL

## 2021-07-24 VITALS
OXYGEN SATURATION: 95 % | WEIGHT: 151.4 LBS | HEIGHT: 62 IN | RESPIRATION RATE: 19 BRPM | BODY MASS INDEX: 27.86 KG/M2 | TEMPERATURE: 98.2 F | DIASTOLIC BLOOD PRESSURE: 65 MMHG | HEART RATE: 71 BPM | SYSTOLIC BLOOD PRESSURE: 97 MMHG

## 2021-07-24 DIAGNOSIS — O12.03 LEG SWELLING IN PREGNANCY IN THIRD TRIMESTER: ICD-10-CM

## 2021-07-24 LAB
ALBUMIN SERPL-MCNC: 3.5 G/DL (ref 3.5–5.7)
ALBUMIN UR-MCNC: NEGATIVE MG/DL
ALP SERPL-CCNC: 99 U/L (ref 34–104)
ALT SERPL W P-5'-P-CCNC: 11 U/L (ref 7–52)
ANION GAP SERPL CALCULATED.3IONS-SCNC: 9 MMOL/L (ref 3–14)
APPEARANCE UR: CLEAR
AST SERPL W P-5'-P-CCNC: 14 U/L (ref 13–39)
BACTERIA #/AREA URNS HPF: ABNORMAL /HPF
BASOPHILS # BLD AUTO: 0 10E3/UL (ref 0–0.2)
BASOPHILS NFR BLD AUTO: 0 %
BILIRUB SERPL-MCNC: 0.6 MG/DL (ref 0.3–1)
BILIRUB UR QL STRIP: NEGATIVE
BUN SERPL-MCNC: 7 MG/DL (ref 7–25)
CALCIUM SERPL-MCNC: 9.4 MG/DL (ref 8.6–10.3)
CHLORIDE BLD-SCNC: 106 MMOL/L (ref 98–107)
CO2 SERPL-SCNC: 20 MMOL/L (ref 21–31)
COLOR UR AUTO: YELLOW
CREAT SERPL-MCNC: 0.49 MG/DL (ref 0.6–1.2)
EOSINOPHIL # BLD AUTO: 0.2 10E3/UL (ref 0–0.7)
EOSINOPHIL NFR BLD AUTO: 2 %
ERYTHROCYTE [DISTWIDTH] IN BLOOD BY AUTOMATED COUNT: 12.6 % (ref 10–15)
GFR SERPL CREATININE-BSD FRML MDRD: >90 ML/MIN/1.73M2
GLUCOSE BLD-MCNC: 93 MG/DL (ref 70–105)
GLUCOSE UR STRIP-MCNC: NEGATIVE MG/DL
HCT VFR BLD AUTO: 39.1 % (ref 35–47)
HGB BLD-MCNC: 13.5 G/DL (ref 11.7–15.7)
HGB UR QL STRIP: NEGATIVE
IMM GRANULOCYTES # BLD: 0 10E3/UL
IMM GRANULOCYTES NFR BLD: 1 %
KETONES UR STRIP-MCNC: NEGATIVE MG/DL
LEUKOCYTE ESTERASE UR QL STRIP: ABNORMAL
LYMPHOCYTES # BLD AUTO: 1 10E3/UL (ref 0.8–5.3)
LYMPHOCYTES NFR BLD AUTO: 13 %
MCH RBC QN AUTO: 29.7 PG (ref 26.5–33)
MCHC RBC AUTO-ENTMCNC: 34.5 G/DL (ref 31.5–36.5)
MCV RBC AUTO: 86 FL (ref 78–100)
MONOCYTES # BLD AUTO: 0.6 10E3/UL (ref 0–1.3)
MONOCYTES NFR BLD AUTO: 8 %
MUCOUS THREADS #/AREA URNS LPF: PRESENT /LPF
NEUTROPHILS # BLD AUTO: 5.7 10E3/UL (ref 1.6–8.3)
NEUTROPHILS NFR BLD AUTO: 76 %
NITRATE UR QL: NEGATIVE
NRBC # BLD AUTO: 0 10E3/UL
NRBC BLD AUTO-RTO: 0 /100
PH UR STRIP: 6 [PH] (ref 5–9)
PLATELET # BLD AUTO: 194 10E3/UL (ref 150–450)
POTASSIUM BLD-SCNC: 3.8 MMOL/L (ref 3.5–5.1)
PROT SERPL-MCNC: 6 G/DL (ref 6.4–8.9)
RBC # BLD AUTO: 4.54 10E6/UL (ref 3.8–5.2)
RBC URINE: 4 /HPF
SODIUM SERPL-SCNC: 135 MMOL/L (ref 134–144)
SP GR UR STRIP: 1.01 (ref 1–1.03)
SQUAMOUS EPITHELIAL: 18 /HPF
UROBILINOGEN UR STRIP-MCNC: NORMAL MG/DL
WBC # BLD AUTO: 7.5 10E3/UL (ref 4–11)
WBC URINE: 14 /HPF

## 2021-07-24 PROCEDURE — 82374 ASSAY BLOOD CARBON DIOXIDE: CPT | Performed by: PHYSICIAN ASSISTANT

## 2021-07-24 PROCEDURE — 82040 ASSAY OF SERUM ALBUMIN: CPT | Performed by: PHYSICIAN ASSISTANT

## 2021-07-24 PROCEDURE — 93971 EXTREMITY STUDY: CPT | Mod: LT

## 2021-07-24 PROCEDURE — 87086 URINE CULTURE/COLONY COUNT: CPT | Performed by: PHYSICIAN ASSISTANT

## 2021-07-24 PROCEDURE — 85004 AUTOMATED DIFF WBC COUNT: CPT | Performed by: PHYSICIAN ASSISTANT

## 2021-07-24 PROCEDURE — 36415 COLL VENOUS BLD VENIPUNCTURE: CPT | Performed by: PHYSICIAN ASSISTANT

## 2021-07-24 PROCEDURE — 99282 EMERGENCY DEPT VISIT SF MDM: CPT | Performed by: PHYSICIAN ASSISTANT

## 2021-07-24 PROCEDURE — 81003 URINALYSIS AUTO W/O SCOPE: CPT | Performed by: EMERGENCY MEDICINE

## 2021-07-24 PROCEDURE — 99284 EMERGENCY DEPT VISIT MOD MDM: CPT | Mod: 25 | Performed by: PHYSICIAN ASSISTANT

## 2021-07-24 ASSESSMENT — ENCOUNTER SYMPTOMS
WOUND: 0
BRUISES/BLEEDS EASILY: 0
CHILLS: 0
CHEST TIGHTNESS: 0
BACK PAIN: 0
FEVER: 0
ADENOPATHY: 0
HEMATURIA: 0
CONFUSION: 0
SHORTNESS OF BREATH: 0
ABDOMINAL PAIN: 0

## 2021-07-24 ASSESSMENT — MIFFLIN-ST. JEOR: SCORE: 1350

## 2021-07-24 NOTE — PROGRESS NOTES
Return OB Visit    S: Noemí is feeling well today. She has no acute concerns. Denies leaking of fluid, vaginal bleeding, painful contractions. Notes fetal movements. Feels comfortable with the IOL plan- will present to labor and delivery tomorrow morning at 5:30am for pitocin induction. No cervical ripening needed.    O:   /70   Pulse 72   Wt 68.3 kg (150 lb 9.6 oz)   LMP 2020   BMI 27.55 kg/m      Gen: Well-appearing, NAD   MFM scan today shows 16%ile overall, but history of 5%ile for most of the pregnancy and will continue to treat as such.       Assessment:  Ms. Noemí Garcia is a 30 year old yo  here for OB follow up. She is currently 38w0d. Her pregnancy is complicated by personal history of Hodgkin's Lymphoma, ASCUS HPV+ pap smear, history of kidney stones in prior pregnancies, IUGR.     Plan:  # Routine Prenatal Care  -- Datin week US MO[place]ST[place]: 2021  -- PNLs:               A neg blood type: ab screen neg              RPR nr              Hep B S Ag neg              Hep C neg              Rubella Immune              HIV neg              ABO/Rh type              Antibody Screen              Urine culture: negative              GC/Chlam: neg     -- Genetic Screening: declines  -- Anatomy US: EFW 19%ile, CL 3.4 cm, MARQUITA 14.5cm, Normal anatomy  -- Immunizations: Declines flu. Tdap , Rhogam   -- 1 hr GTT: 92  -- 3rd TM labs including CBC, RPR: RPR nr, Hgb 13.3  -- GBS: negative  -- Postpartum Planning: Plans to try breastfeeding, interested in postpartum tubal  -- Delivery Planning:  Recommendations per the Society of Maternal Fetal medicine:For estimated fetal weight between the 3rd and 10th percentile and normal umbilical artery Doppler, delivery is suggested at 38 0/7 and 39 0/7 weeks of gestation.: IOL planned for , pitocin. No ripening needed     # IUGR  -- US 2021: overall EFW 5%ile (AC 5%ile, BPD 9%ile, HC 15%ile, FL 8%ile)              : normal umbilical artery  dopplers (2.5-3.1)  -- Follow up MFM scan on 5/25/2021: overall EFW 25%ile, (AC 27%ile)  -- MFM scan 6/15/21: overall EFW 9%ile, AC17%ile)              UAD 1.16  -- Serial Growth US q 3-4 weeks              7/13: 2330 grams, EFW 5%Ile (AC 14%ile, HC 11%ile)   7/27: 2808 gm, EFW 16%ile (AC 30%ile)  -- s/p betamethasone given 5/19 and 5/20   -- Weekly BPPs with UAD: ordered              Today 8/8     # Rh negative (A neg)  -- Rhogam given 5/18     # History of Maternal Hodgkin's Lymphoma: s/p chemotherapy and surgery to remove groin lymph nodes     # History of previous hydronephrosis from kidney stones and nephrostomy tube placement during last two pregnancies      # Hematuria- resolved  -- Suspected recurrent kidney stone  -- Renal US 3/19 shows mild dilation of left renal pelvis, no dilation on the right.   -- Creatinine 3/19: 0.61, 3/25: 0.53     # Abnormal pap  - [place] performed 2/2/2021 with low-grade changes suspected at 12 o'clock position. As it is low-grade in appearance and she is pregnant, discussed repeat colposcopy, Pap and HPV testing postpartum.

## 2021-07-24 NOTE — DISCHARGE INSTRUCTIONS
Get plenty of fluids and rest.  As we discussed, all of your vital signs, lab work, imaging appear excellent today.  Difficult to say what may have caused her symptoms but things are looking well.  I did discuss your case with your OB/GYN, is recommended to continue taking it easy and to follow-up this coming week as already scheduled.  Return if there are worsening or concerning symptoms.

## 2021-07-24 NOTE — ED TRIAGE NOTES
ED Nursing Triage Note (General)   ________________________________    Noemí Garcia is a 30 year old Female that presents to triage private car  With history of  Pedal edema at 38 weeks pregnant reported by patient   Significant symptoms had onset 2 days ago.  LMP 11/03/2020 t  Patient appears alert  and oriented, in no acute distress., and cooperative, pleasant and calm behavior.    GCS 15Airway: intact  Breathing noted as Normal  Circulation Normal  Skin:  Normal  Action taken: To triage waiting room    PRE HOSPITAL PRIOR LIVING SITUATION Spouse

## 2021-07-24 NOTE — ED PROVIDER NOTES
History     Chief Complaint   Patient presents with     Leg Swelling     Patient is 38 weeks pregnant     HPI  Noemí Garcai is a 30 year old female who presents to the ED for evaluation of leg swelling. She is ~ 38 weeks pregnant. She reports noticing some left pedal edema that began last night while she was eating dinner.  It is not painful.  She has not noticed any fevers or increased redness to her foot.  There are no reported injuries.  She denies any chest pain, shortness of breath or no abdominal pain or vaginal bleeding, no dysuria..  She has no past history of blood clots.  She does have a past history of Hodgkin's lymphoma.    Allergies:  Allergies   Allergen Reactions     Adhesive Tape Other (See Comments)     Blistered skin       Problem List:    Patient Active Problem List    Diagnosis Date Noted     Lightheadedness 2020     Priority: Medium     Hodgkin's disease of lymph nodes of multiple sites (H) 2019     Priority: Medium     Bipolar II disorder, moderate, depressed, with anxious distress (H) 10/05/2018     Priority: Medium     Deviated nasal septum 03/10/2016     Priority: Medium     Generalized anxiety disorder 2016     Priority: Medium     Kidney stones 2015     Priority: Medium     Renal colic 2015     Priority: Medium     Anal fissure 07/10/2013     Priority: Medium        Past Medical History:    Past Medical History:   Diagnosis Date     Abnormal Pap smear of cervix        Past Surgical History:    Past Surgical History:   Procedure Laterality Date     CHOLECYSTECTOMY       COLPOSCOPY         Family History:    History reviewed. No pertinent family history.    Social History:  Marital Status:  Single [1]  Social History     Tobacco Use     Smoking status: Former Smoker     Packs/day: 0.25     Quit date: 2020     Years since quittin.6     Smokeless tobacco: Never Used     Tobacco comment: 20: 1-2 per day   Vaping Use     Vaping Use: Every day      "Substances: Nicotine, Flavoring     Devices: Pre-filled pod   Substance Use Topics     Alcohol use: Not Currently     Drug use: Not Currently     Types: Methamphetamines     Comment: 12/2020: over 1 year clean        Medications:    clindamycin-benzoyl peroxide (BENZACLIN) 1-5 % external gel  Prenatal Vit-Fe Fumarate-FA (PRENATAL MULTIVITAMIN W/IRON) 27-0.8 MG tablet  cholecalciferol 25 MCG (1000 UT) TABS          Review of Systems   Constitutional: Negative for chills and fever.   HENT: Negative for congestion.    Eyes: Negative for visual disturbance.   Respiratory: Negative for chest tightness and shortness of breath.    Cardiovascular: Positive for leg swelling. Negative for chest pain.   Gastrointestinal: Negative for abdominal pain.   Genitourinary: Negative for hematuria.   Musculoskeletal: Negative for back pain.   Skin: Negative for rash and wound.   Neurological: Negative for syncope.   Hematological: Negative for adenopathy. Does not bruise/bleed easily.   Psychiatric/Behavioral: Negative for confusion.       Physical Exam   BP: 126/88  Pulse: 80  Temp: 98.2  F (36.8  C)  Resp: 19  Height: 157.5 cm (5' 2\")  Weight: 68.7 kg (151 lb 6.4 oz)  SpO2: 97 %      Physical Exam  Constitutional:       General: She is not in acute distress.     Appearance: She is well-developed. She is not diaphoretic.   HENT:      Head: Normocephalic and atraumatic.   Eyes:      General: No scleral icterus.     Conjunctiva/sclera: Conjunctivae normal.   Cardiovascular:      Rate and Rhythm: Normal rate and regular rhythm.   Pulmonary:      Effort: Pulmonary effort is normal.      Breath sounds: Normal breath sounds.   Abdominal:      Palpations: Abdomen is soft.      Tenderness: There is no abdominal tenderness.   Musculoskeletal:         General: No deformity.      Cervical back: Neck supple.      Right lower leg: Edema present.      Left lower leg: Edema present.   Lymphadenopathy:      Cervical: No cervical adenopathy.   Skin:   "   General: Skin is warm and dry.      Findings: No rash.   Neurological:      Mental Status: She is alert and oriented to person, place, and time. Mental status is at baseline.   Psychiatric:         Mood and Affect: Mood normal.         Behavior: Behavior normal.         ED Course        Procedures              Critical Care time:  none               Results for orders placed or performed during the hospital encounter of 07/24/21 (from the past 24 hour(s))   UA reflex to Microscopic   Result Value Ref Range    Color Urine Yellow Colorless, Straw, Light Yellow, Yellow    Appearance Urine Clear Clear    Glucose Urine Negative Negative mg/dL    Bilirubin Urine Negative Negative    Ketones Urine Negative Negative mg/dL    Specific Gravity Urine 1.015 1.000 - 1.030    Blood Urine Negative Negative    pH Urine 6.0 5.0 - 9.0    Protein Albumin Urine Negative Negative mg/dL    Urobilinogen Urine Normal Normal, 2.0 mg/dL    Nitrite Urine Negative Negative    Leukocyte Esterase Urine Moderate (A) Negative    Bacteria Urine Few (A) None Seen /HPF    RBC Urine 4 (H) <=2 /HPF    WBC Urine 14 (H) <=5 /HPF    Squamous Epithelials Urine 18 (H) <=1 /HPF    Mucus Urine Present (A) None Seen /LPF   CBC with platelets differential    Narrative    The following orders were created for panel order CBC with platelets differential.  Procedure                               Abnormality         Status                     ---------                               -----------         ------                     CBC with platelets and d...[[id number]]                      Final result                 Please view results for these tests on the individual orders.   Comprehensive metabolic panel   Result Value Ref Range    Sodium 135 134 - 144 mmol/L    Potassium 3.8 3.5 - 5.1 mmol/L    Chloride 106 98 - 107 mmol/L    Carbon Dioxide (CO2) 20 (L) 21 - 31 mmol/L    Anion Gap 9 3 - 14 mmol/L    Urea Nitrogen 7 7 - 25 mg/dL    Creatinine 0.49 (L) 0.60 -  1.20 mg/dL    Calcium 9.4 8.6 - 10.3 mg/dL    Glucose 93 70 - 105 mg/dL    Alkaline Phosphatase 99 34 - 104 U/L    AST 14 13 - 39 U/L    ALT 11 7 - 52 U/L    Protein Total 6.0 (L) 6.4 - 8.9 g/dL    Albumin 3.5 3.5 - 5.7 g/dL    Bilirubin Total 0.6 0.3 - 1.0 mg/dL    GFR Estimate >90 >60 mL/min/1.73m2   CBC with platelets and differential   Result Value Ref Range    WBC Count 7.5 4.0 - 11.0 10e3/uL    RBC Count 4.54 3.80 - 5.20 10e6/uL    Hemoglobin 13.5 11.7 - 15.7 g/dL    Hematocrit 39.1 35.0 - 47.0 %    MCV 86 78 - 100 fL    MCH 29.7 26.5 - 33.0 pg    MCHC 34.5 31.5 - 36.5 g/dL    RDW 12.6 10.0 - 15.0 %    Platelet Count 194 150 - 450 10e3/uL    % Neutrophils 76 %    % Lymphocytes 13 %    % Monocytes 8 %    % Eosinophils 2 %    % Basophils 0 %    % Immature Granulocytes 1 %    NRBCs per 100 WBC 0 <1 /100    Absolute Neutrophils 5.7 1.6 - 8.3 10e3/uL    Absolute Lymphocytes 1.0 0.8 - 5.3 10e3/uL    Absolute Monocytes 0.6 0.0 - 1.3 10e3/uL    Absolute Eosinophils 0.2 0.0 - 0.7 10e3/uL    Absolute Basophils 0.0 0.0 - 0.2 10e3/uL    Absolute Immature Granulocytes 0.0 <=0.0 10e3/uL    Absolute NRBCs 0.0 10e3/uL   US Lower Extremity Venous Duplex Left    Narrative    Exam:US LOWER EXTREMITY VENOUS DUPLEX LEFT    History: Left lower extremity swelling    Comparisons: None    Technique: Venous duplex ultrasonography of the left lower extremity  was performed.     Findings: The common femoral vein, superficial femoral vein and  popliteal vein are fully compressible with spontaneous and augmentable  venous flow.           Impression    Impression: No evidence of deep venous thrombosis within the left  lower extremity.    MARGARITO NI MD         SYSTEM ID:  [id number]       Medications - No data to display    Assessments & Plan (with Medical Decision Making)   Pt nontoxic in NAD. Heart, lung, bowel sounds normal, abd soft, nontender to palpation, nondistended. VSS, afebrile.     Very slight swelling to top of left foot.  Good distal CMS. Normal temperature and color of skin.     She has no leukocytosis and normal hemoglobin.  CMP appears well with normal LFTs.  She has no proteinuria.  All those findings along with normal blood pressure, I have very low suspicion for preeclampsia.  Urinalysis did show moderate leukocyte esterase with some bacteria and 14 white cells however 18 squamous cells are present as well.  Is not having any urinary infection symptoms.    Ultrasound of lower extremity is negative for any sign of DVT.    I did discuss case with SULAIMAN Pittman.  Currently, she does not recommend treating for UTI however we will culture.  Recommend patient continue with conservative treatment at home continue follow-up next week as already scheduled.  I discussed this with patient and she is comfortable with that plan.  She will return if there are worsening concerning symptoms she is discharged.    Romeo Mercedes PA-C    I have reviewed the nursing notes.    I have reviewed the findings, diagnosis, plan and need for follow up with the patient.       Discharge Medication List as of 7/24/2021  1:12 PM          Final diagnoses:   Leg swelling in pregnancy in third trimester       7/24/2021   [facility]     Romeo Mercedes PA  07/24/21 [id number]

## 2021-07-26 LAB — BACTERIA UR CULT: NO GROWTH

## 2021-07-26 NOTE — RESULT ENCOUNTER NOTE
"Final urine culture report shows \"NO GROWTH\" and is NEGATIVE.  Recommendations in treatment per [facility] ED Lab result Urine culture protocol.    "

## 2021-07-27 ENCOUNTER — OFFICE VISIT (OUTPATIENT)
Dept: MATERNAL FETAL MEDICINE | Facility: CLINIC | Age: 31
End: 2021-07-27
Attending: STUDENT IN AN ORGANIZED HEALTH CARE EDUCATION/TRAINING PROGRAM
Payer: COMMERCIAL

## 2021-07-27 ENCOUNTER — HOSPITAL ENCOUNTER (OUTPATIENT)
Dept: ULTRASOUND IMAGING | Facility: OTHER | Age: 31
End: 2021-07-27
Attending: STUDENT IN AN ORGANIZED HEALTH CARE EDUCATION/TRAINING PROGRAM
Payer: COMMERCIAL

## 2021-07-27 ENCOUNTER — PRENATAL OFFICE VISIT (OUTPATIENT)
Dept: OBGYN | Facility: OTHER | Age: 31
End: 2021-07-27
Attending: STUDENT IN AN ORGANIZED HEALTH CARE EDUCATION/TRAINING PROGRAM
Payer: COMMERCIAL

## 2021-07-27 ENCOUNTER — HOSPITAL ENCOUNTER (OUTPATIENT)
Dept: ULTRASOUND IMAGING | Facility: CLINIC | Age: 31
End: 2021-07-27
Attending: STUDENT IN AN ORGANIZED HEALTH CARE EDUCATION/TRAINING PROGRAM
Payer: COMMERCIAL

## 2021-07-27 VITALS
SYSTOLIC BLOOD PRESSURE: 110 MMHG | WEIGHT: 150.6 LBS | BODY MASS INDEX: 27.55 KG/M2 | DIASTOLIC BLOOD PRESSURE: 70 MMHG | HEART RATE: 72 BPM

## 2021-07-27 DIAGNOSIS — O36.5990 PREGNANCY AFFECTED BY FETAL GROWTH RESTRICTION: Primary | ICD-10-CM

## 2021-07-27 DIAGNOSIS — O36.5930 POOR FETAL GROWTH AFFECTING MANAGEMENT OF MOTHER IN THIRD TRIMESTER, SINGLE OR UNSPECIFIED FETUS: ICD-10-CM

## 2021-07-27 DIAGNOSIS — Z85.72 HISTORY OF LYMPHOMA: ICD-10-CM

## 2021-07-27 DIAGNOSIS — R87.610 ASCUS WITH POSITIVE HIGH RISK HPV CERVICAL: ICD-10-CM

## 2021-07-27 DIAGNOSIS — O09.90 HIGH-RISK PREGNANCY, UNSPECIFIED TRIMESTER: Primary | ICD-10-CM

## 2021-07-27 DIAGNOSIS — R87.810 ASCUS WITH POSITIVE HIGH RISK HPV CERVICAL: ICD-10-CM

## 2021-07-27 DIAGNOSIS — O09.90 HIGH-RISK PREGNANCY, UNSPECIFIED TRIMESTER: ICD-10-CM

## 2021-07-27 LAB — SARS-COV-2 RNA RESP QL NAA+PROBE: NEGATIVE

## 2021-07-27 PROCEDURE — U0003 INFECTIOUS AGENT DETECTION BY NUCLEIC ACID (DNA OR RNA); SEVERE ACUTE RESPIRATORY SYNDROME CORONAVIRUS 2 (SARS-COV-2) (CORONAVIRUS DISEASE [COVID-19]), AMPLIFIED PROBE TECHNIQUE, MAKING USE OF HIGH THROUGHPUT TECHNOLOGIES AS DESCRIBED BY CMS-2020-01-R: HCPCS | Mod: ZL | Performed by: STUDENT IN AN ORGANIZED HEALTH CARE EDUCATION/TRAINING PROGRAM

## 2021-07-27 PROCEDURE — 76819 FETAL BIOPHYS PROFIL W/O NST: CPT

## 2021-07-27 PROCEDURE — C9803 HOPD COVID-19 SPEC COLLECT: HCPCS

## 2021-07-27 PROCEDURE — 99207 PR OB VISIT-NO CHARGE - GICH ONLY: CPT | Performed by: STUDENT IN AN ORGANIZED HEALTH CARE EDUCATION/TRAINING PROGRAM

## 2021-07-27 RX ORDER — IBUPROFEN 200 MG
600 TABLET ORAL
Status: CANCELLED | OUTPATIENT
Start: 2021-07-27 | End: 2021-08-01

## 2021-07-27 RX ORDER — NALOXONE HYDROCHLORIDE 0.4 MG/ML
0.4 INJECTION, SOLUTION INTRAMUSCULAR; INTRAVENOUS; SUBCUTANEOUS
Status: CANCELLED | OUTPATIENT
Start: 2021-07-27

## 2021-07-27 RX ORDER — MISOPROSTOL 100 UG/1
400 TABLET ORAL
Status: CANCELLED | OUTPATIENT
Start: 2021-07-27

## 2021-07-27 RX ORDER — ACETAMINOPHEN 325 MG/1
650 TABLET ORAL EVERY 4 HOURS PRN
Status: CANCELLED | OUTPATIENT
Start: 2021-07-27

## 2021-07-27 RX ORDER — NALOXONE HYDROCHLORIDE 0.4 MG/ML
0.2 INJECTION, SOLUTION INTRAMUSCULAR; INTRAVENOUS; SUBCUTANEOUS
Status: CANCELLED | OUTPATIENT
Start: 2021-07-27

## 2021-07-27 RX ORDER — PROCHLORPERAZINE 25 MG
25 SUPPOSITORY, RECTAL RECTAL EVERY 12 HOURS PRN
Status: CANCELLED | OUTPATIENT
Start: 2021-07-27

## 2021-07-27 RX ORDER — CARBOPROST TROMETHAMINE 250 UG/ML
250 INJECTION, SOLUTION INTRAMUSCULAR
Status: CANCELLED | OUTPATIENT
Start: 2021-07-27

## 2021-07-27 RX ORDER — OXYTOCIN/0.9 % SODIUM CHLORIDE 30/500 ML
1-24 PLASTIC BAG, INJECTION (ML) INTRAVENOUS CONTINUOUS
Status: CANCELLED | OUTPATIENT
Start: 2021-07-27

## 2021-07-27 RX ORDER — ONDANSETRON 4 MG/1
4 TABLET, ORALLY DISINTEGRATING ORAL EVERY 6 HOURS PRN
Status: CANCELLED | OUTPATIENT
Start: 2021-07-27

## 2021-07-27 RX ORDER — KETOROLAC TROMETHAMINE 30 MG/ML
30 INJECTION, SOLUTION INTRAMUSCULAR; INTRAVENOUS
Status: CANCELLED | OUTPATIENT
Start: 2021-07-27 | End: 2021-08-01

## 2021-07-27 RX ORDER — FENTANYL CITRATE 50 UG/ML
50-100 INJECTION, SOLUTION INTRAMUSCULAR; INTRAVENOUS
Status: CANCELLED | OUTPATIENT
Start: 2021-07-27

## 2021-07-27 RX ORDER — METOCLOPRAMIDE 10 MG/1
10 TABLET ORAL EVERY 6 HOURS PRN
Status: CANCELLED | OUTPATIENT
Start: 2021-07-27

## 2021-07-27 RX ORDER — ONDANSETRON 2 MG/ML
4 INJECTION INTRAMUSCULAR; INTRAVENOUS EVERY 6 HOURS PRN
Status: CANCELLED | OUTPATIENT
Start: 2021-07-27

## 2021-07-27 RX ORDER — TERBUTALINE SULFATE 1 MG/ML
0.25 INJECTION, SOLUTION SUBCUTANEOUS
Status: CANCELLED | OUTPATIENT
Start: 2021-07-27

## 2021-07-27 RX ORDER — PROCHLORPERAZINE MALEATE 10 MG
10 TABLET ORAL EVERY 6 HOURS PRN
Status: CANCELLED | OUTPATIENT
Start: 2021-07-27

## 2021-07-27 RX ORDER — OXYTOCIN/0.9 % SODIUM CHLORIDE 30/500 ML
100-340 PLASTIC BAG, INJECTION (ML) INTRAVENOUS CONTINUOUS PRN
Status: CANCELLED | OUTPATIENT
Start: 2021-07-27

## 2021-07-27 RX ORDER — OXYTOCIN 10 [USP'U]/ML
10 INJECTION, SOLUTION INTRAMUSCULAR; INTRAVENOUS
Status: CANCELLED | OUTPATIENT
Start: 2021-07-27

## 2021-07-27 RX ORDER — METHYLERGONOVINE MALEATE 0.2 MG/ML
200 INJECTION INTRAVENOUS
Status: CANCELLED | OUTPATIENT
Start: 2021-07-27

## 2021-07-27 RX ORDER — OXYTOCIN/0.9 % SODIUM CHLORIDE 30/500 ML
340 PLASTIC BAG, INJECTION (ML) INTRAVENOUS CONTINUOUS PRN
Status: CANCELLED | OUTPATIENT
Start: 2021-07-27

## 2021-07-27 RX ORDER — TRANEXAMIC ACID 10 MG/ML
1 INJECTION, SOLUTION INTRAVENOUS EVERY 30 MIN PRN
Status: CANCELLED | OUTPATIENT
Start: 2021-07-27

## 2021-07-27 RX ORDER — METOCLOPRAMIDE HYDROCHLORIDE 5 MG/ML
10 INJECTION INTRAMUSCULAR; INTRAVENOUS EVERY 6 HOURS PRN
Status: CANCELLED | OUTPATIENT
Start: 2021-07-27

## 2021-07-27 RX ORDER — LIDOCAINE 40 MG/G
CREAM TOPICAL
Status: CANCELLED | OUTPATIENT
Start: 2021-07-27

## 2021-07-27 ASSESSMENT — PAIN SCALES - GENERAL: PAINLEVEL: NO PAIN (0)

## 2021-07-27 NOTE — NURSING NOTE
Pt presents to clinic today for prenatal care 38w0d. Pt denies any bleeding, or leakage of fluid at this time. States baby is moving a lot and has noticed a few contractions.      Medication Reconciliation: complete  Simin Hannah LPN

## 2021-07-27 NOTE — PROGRESS NOTES
Type of service: Telemedicine Office Visit for prenatal ultrasound    Date of service:  Date: 07/27/21     Time service began:  9:00 AM    Time service ended:  10:00 AM    Reason: .tel: Lack of available service in patient area    Description of basis or telemedicine appropriateness:     Consultation provided at the request of Dr. Marilyn Andino for advice regarding the diagnosis and treatment of this patient's pregnancy.  The patient's condition can be safely assessed via telemedicine.    The Mode of Transmission:    Secure interactive audio and visual telecommunication system (Video Guidance)    Location of originating and distant sites:      Originating site:  [place], [place]    Distant site:   [place], [place]    Guido Gutierres MD

## 2021-07-28 ENCOUNTER — HOSPITAL ENCOUNTER (INPATIENT)
Facility: OTHER | Age: 31
LOS: 2 days | Discharge: HOME | End: 2021-07-30
Attending: STUDENT IN AN ORGANIZED HEALTH CARE EDUCATION/TRAINING PROGRAM | Admitting: STUDENT IN AN ORGANIZED HEALTH CARE EDUCATION/TRAINING PROGRAM
Payer: COMMERCIAL

## 2021-07-28 ENCOUNTER — ANESTHESIA (OUTPATIENT)
Dept: OBGYN | Facility: OTHER | Age: 31
End: 2021-07-28
Payer: COMMERCIAL

## 2021-07-28 ENCOUNTER — ANESTHESIA EVENT (OUTPATIENT)
Dept: OBGYN | Facility: OTHER | Age: 31
End: 2021-07-28
Payer: COMMERCIAL

## 2021-07-28 DIAGNOSIS — O36.5930 POOR FETAL GROWTH AFFECTING MANAGEMENT OF MOTHER IN THIRD TRIMESTER, SINGLE OR UNSPECIFIED FETUS: ICD-10-CM

## 2021-07-28 PROBLEM — R87.810 ASCUS WITH POSITIVE HIGH RISK HPV CERVICAL: Status: ACTIVE | Noted: 2021-07-28

## 2021-07-28 PROBLEM — O09.90 HIGH-RISK PREGNANCY, UNSPECIFIED TRIMESTER: Status: ACTIVE | Noted: 2021-07-28

## 2021-07-28 PROBLEM — Z85.72 HISTORY OF LYMPHOMA: Status: ACTIVE | Noted: 2021-07-28

## 2021-07-28 PROBLEM — R87.610 ASCUS WITH POSITIVE HIGH RISK HPV CERVICAL: Status: ACTIVE | Noted: 2021-07-28

## 2021-07-28 LAB
ABO/RH(D): ABNORMAL
ANTIBODY ID: NORMAL
ANTIBODY SCREEN: POSITIVE
BASOPHILS # BLD AUTO: 0 10E3/UL (ref 0–0.2)
BASOPHILS NFR BLD AUTO: 0 %
EOSINOPHIL # BLD AUTO: 0.1 10E3/UL (ref 0–0.7)
EOSINOPHIL NFR BLD AUTO: 2 %
ERYTHROCYTE [DISTWIDTH] IN BLOOD BY AUTOMATED COUNT: 12.7 % (ref 10–15)
HCT VFR BLD AUTO: 38.8 % (ref 35–47)
HGB BLD-MCNC: 13.4 G/DL (ref 11.7–15.7)
IMM GRANULOCYTES # BLD: 0 10E3/UL
IMM GRANULOCYTES NFR BLD: 0 %
LYMPHOCYTES # BLD AUTO: 1.1 10E3/UL (ref 0.8–5.3)
LYMPHOCYTES NFR BLD AUTO: 14 %
MCH RBC QN AUTO: 29.8 PG (ref 26.5–33)
MCHC RBC AUTO-ENTMCNC: 34.5 G/DL (ref 31.5–36.5)
MCV RBC AUTO: 86 FL (ref 78–100)
MONOCYTES # BLD AUTO: 0.4 10E3/UL (ref 0–1.3)
MONOCYTES NFR BLD AUTO: 5 %
NEUTROPHILS # BLD AUTO: 6.1 10E3/UL (ref 1.6–8.3)
NEUTROPHILS NFR BLD AUTO: 79 %
NRBC # BLD AUTO: 0 10E3/UL
NRBC BLD AUTO-RTO: 0 /100
PLATELET # BLD AUTO: 186 10E3/UL (ref 150–450)
RBC # BLD AUTO: 4.49 10E6/UL (ref 3.8–5.2)
SPECIMEN EXPIRATION DATE: ABNORMAL
SPECIMEN EXPIRATION DATE: NORMAL
WBC # BLD AUTO: 7.8 10E3/UL (ref 4–11)

## 2021-07-28 PROCEDURE — 258N000003 HC RX IP 258 OP 636: Performed by: STUDENT IN AN ORGANIZED HEALTH CARE EDUCATION/TRAINING PROGRAM

## 2021-07-28 PROCEDURE — 86901 BLOOD TYPING SEROLOGIC RH(D): CPT | Performed by: STUDENT IN AN ORGANIZED HEALTH CARE EDUCATION/TRAINING PROGRAM

## 2021-07-28 PROCEDURE — 250N000011 HC RX IP 250 OP 636: Performed by: STUDENT IN AN ORGANIZED HEALTH CARE EDUCATION/TRAINING PROGRAM

## 2021-07-28 PROCEDURE — 85025 COMPLETE CBC W/AUTO DIFF WBC: CPT | Performed by: STUDENT IN AN ORGANIZED HEALTH CARE EDUCATION/TRAINING PROGRAM

## 2021-07-28 PROCEDURE — 250N000011 HC RX IP 250 OP 636: Performed by: NURSE ANESTHETIST, CERTIFIED REGISTERED

## 2021-07-28 PROCEDURE — 36415 COLL VENOUS BLD VENIPUNCTURE: CPT | Performed by: STUDENT IN AN ORGANIZED HEALTH CARE EDUCATION/TRAINING PROGRAM

## 2021-07-28 PROCEDURE — 370N000003 HC ANESTHESIA WARD SERVICE

## 2021-07-28 PROCEDURE — 88307 TISSUE EXAM BY PATHOLOGIST: CPT

## 2021-07-28 PROCEDURE — 250N000013 HC RX MED GY IP 250 OP 250 PS 637: Performed by: OBSTETRICS & GYNECOLOGY

## 2021-07-28 PROCEDURE — 258N000003 HC RX IP 258 OP 636: Performed by: NURSE ANESTHETIST, CERTIFIED REGISTERED

## 2021-07-28 PROCEDURE — 59400 OBSTETRICAL CARE: CPT | Performed by: OBSTETRICS & GYNECOLOGY

## 2021-07-28 PROCEDURE — 250N000009 HC RX 250: Performed by: NURSE ANESTHETIST, CERTIFIED REGISTERED

## 2021-07-28 PROCEDURE — 86850 RBC ANTIBODY SCREEN: CPT | Performed by: STUDENT IN AN ORGANIZED HEALTH CARE EDUCATION/TRAINING PROGRAM

## 2021-07-28 PROCEDURE — 250N000009 HC RX 250: Performed by: STUDENT IN AN ORGANIZED HEALTH CARE EDUCATION/TRAINING PROGRAM

## 2021-07-28 PROCEDURE — 10907ZC DRAINAGE OF AMNIOTIC FLUID, THERAPEUTIC FROM PRODUCTS OF CONCEPTION, VIA NATURAL OR ARTIFICIAL OPENING: ICD-10-PCS | Performed by: STUDENT IN AN ORGANIZED HEALTH CARE EDUCATION/TRAINING PROGRAM

## 2021-07-28 PROCEDURE — 120N000001 HC R&B MED SURG/OB

## 2021-07-28 PROCEDURE — 722N000001 HC LABOR CARE VAGINAL DELIVERY SINGLE

## 2021-07-28 PROCEDURE — 86780 TREPONEMA PALLIDUM: CPT | Performed by: STUDENT IN AN ORGANIZED HEALTH CARE EDUCATION/TRAINING PROGRAM

## 2021-07-28 PROCEDURE — 3E033VJ INTRODUCTION OF OTHER HORMONE INTO PERIPHERAL VEIN, PERCUTANEOUS APPROACH: ICD-10-PCS | Performed by: STUDENT IN AN ORGANIZED HEALTH CARE EDUCATION/TRAINING PROGRAM

## 2021-07-28 PROCEDURE — 86870 RBC ANTIBODY IDENTIFICATION: CPT | Performed by: STUDENT IN AN ORGANIZED HEALTH CARE EDUCATION/TRAINING PROGRAM

## 2021-07-28 RX ORDER — OXYTOCIN 10 [USP'U]/ML
10 INJECTION, SOLUTION INTRAMUSCULAR; INTRAVENOUS
Status: DISCONTINUED | OUTPATIENT
Start: 2021-07-28 | End: 2021-07-28

## 2021-07-28 RX ORDER — OXYTOCIN/0.9 % SODIUM CHLORIDE 30/500 ML
1-24 PLASTIC BAG, INJECTION (ML) INTRAVENOUS CONTINUOUS
Status: DISCONTINUED | OUTPATIENT
Start: 2021-07-28 | End: 2021-07-28 | Stop reason: HOSPADM

## 2021-07-28 RX ORDER — CARBOPROST TROMETHAMINE 250 UG/ML
250 INJECTION, SOLUTION INTRAMUSCULAR
Status: DISCONTINUED | OUTPATIENT
Start: 2021-07-28 | End: 2021-07-28 | Stop reason: HOSPADM

## 2021-07-28 RX ORDER — OXYTOCIN/0.9 % SODIUM CHLORIDE 30/500 ML
340 PLASTIC BAG, INJECTION (ML) INTRAVENOUS CONTINUOUS PRN
Status: DISCONTINUED | OUTPATIENT
Start: 2021-07-28 | End: 2021-07-28 | Stop reason: HOSPADM

## 2021-07-28 RX ORDER — TERBUTALINE SULFATE 1 MG/ML
0.25 INJECTION, SOLUTION SUBCUTANEOUS
Status: DISCONTINUED | OUTPATIENT
Start: 2021-07-28 | End: 2021-07-28 | Stop reason: HOSPADM

## 2021-07-28 RX ORDER — LIDOCAINE HYDROCHLORIDE 10 MG/ML
INJECTION, SOLUTION INFILTRATION; PERINEURAL PRN
Status: DISCONTINUED | OUTPATIENT
Start: 2021-07-28 | End: 2021-07-28

## 2021-07-28 RX ORDER — CARBOPROST TROMETHAMINE 250 UG/ML
250 INJECTION, SOLUTION INTRAMUSCULAR
Status: DISCONTINUED | OUTPATIENT
Start: 2021-07-28 | End: 2021-07-30 | Stop reason: HOSPADM

## 2021-07-28 RX ORDER — MISOPROSTOL 100 UG/1
400 TABLET ORAL
Status: DISCONTINUED | OUTPATIENT
Start: 2021-07-28 | End: 2021-07-28 | Stop reason: HOSPADM

## 2021-07-28 RX ORDER — TRANEXAMIC ACID 10 MG/ML
1 INJECTION, SOLUTION INTRAVENOUS EVERY 30 MIN PRN
Status: DISCONTINUED | OUTPATIENT
Start: 2021-07-28 | End: 2021-07-30 | Stop reason: HOSPADM

## 2021-07-28 RX ORDER — PROCHLORPERAZINE 25 MG
25 SUPPOSITORY, RECTAL RECTAL EVERY 12 HOURS PRN
Status: DISCONTINUED | OUTPATIENT
Start: 2021-07-28 | End: 2021-07-28 | Stop reason: HOSPADM

## 2021-07-28 RX ORDER — BISACODYL 10 MG
10 SUPPOSITORY, RECTAL RECTAL DAILY PRN
Status: DISCONTINUED | OUTPATIENT
Start: 2021-07-28 | End: 2021-07-30 | Stop reason: HOSPADM

## 2021-07-28 RX ORDER — FENTANYL/BUPIVACAINE/NS/PF 2-1250MCG
PLASTIC BAG, INJECTION (ML) INJECTION CONTINUOUS PRN
Status: DISCONTINUED | OUTPATIENT
Start: 2021-07-28 | End: 2021-07-28

## 2021-07-28 RX ORDER — NALOXONE HYDROCHLORIDE 0.4 MG/ML
0.4 INJECTION, SOLUTION INTRAMUSCULAR; INTRAVENOUS; SUBCUTANEOUS
Status: DISCONTINUED | OUTPATIENT
Start: 2021-07-28 | End: 2021-07-28 | Stop reason: HOSPADM

## 2021-07-28 RX ORDER — KETOROLAC TROMETHAMINE 30 MG/ML
30 INJECTION, SOLUTION INTRAMUSCULAR; INTRAVENOUS
Status: DISCONTINUED | OUTPATIENT
Start: 2021-07-28 | End: 2021-07-28

## 2021-07-28 RX ORDER — IBUPROFEN 600 MG/1
600 TABLET, FILM COATED ORAL
Status: DISCONTINUED | OUTPATIENT
Start: 2021-07-28 | End: 2021-07-28

## 2021-07-28 RX ORDER — METHYLERGONOVINE MALEATE 0.2 MG/ML
200 INJECTION INTRAVENOUS
Status: DISCONTINUED | OUTPATIENT
Start: 2021-07-28 | End: 2021-07-28 | Stop reason: HOSPADM

## 2021-07-28 RX ORDER — ACETAMINOPHEN 325 MG/1
650 TABLET ORAL EVERY 4 HOURS PRN
Status: DISCONTINUED | OUTPATIENT
Start: 2021-07-28 | End: 2021-07-30 | Stop reason: HOSPADM

## 2021-07-28 RX ORDER — OXYTOCIN/0.9 % SODIUM CHLORIDE 30/500 ML
100-340 PLASTIC BAG, INJECTION (ML) INTRAVENOUS CONTINUOUS PRN
Status: DISCONTINUED | OUTPATIENT
Start: 2021-07-28 | End: 2021-07-28

## 2021-07-28 RX ORDER — METHYLERGONOVINE MALEATE 0.2 MG/ML
200 INJECTION INTRAVENOUS
Status: DISCONTINUED | OUTPATIENT
Start: 2021-07-28 | End: 2021-07-30 | Stop reason: HOSPADM

## 2021-07-28 RX ORDER — NALOXONE HYDROCHLORIDE 0.4 MG/ML
0.2 INJECTION, SOLUTION INTRAMUSCULAR; INTRAVENOUS; SUBCUTANEOUS
Status: DISCONTINUED | OUTPATIENT
Start: 2021-07-28 | End: 2021-07-28 | Stop reason: HOSPADM

## 2021-07-28 RX ORDER — METOCLOPRAMIDE HYDROCHLORIDE 5 MG/ML
10 INJECTION INTRAMUSCULAR; INTRAVENOUS EVERY 6 HOURS PRN
Status: DISCONTINUED | OUTPATIENT
Start: 2021-07-28 | End: 2021-07-28 | Stop reason: HOSPADM

## 2021-07-28 RX ORDER — FENTANYL CITRATE 50 UG/ML
50-100 INJECTION, SOLUTION INTRAMUSCULAR; INTRAVENOUS
Status: DISCONTINUED | OUTPATIENT
Start: 2021-07-28 | End: 2021-07-28 | Stop reason: HOSPADM

## 2021-07-28 RX ORDER — ACETAMINOPHEN 325 MG/1
650 TABLET ORAL EVERY 4 HOURS PRN
Status: DISCONTINUED | OUTPATIENT
Start: 2021-07-28 | End: 2021-07-28 | Stop reason: HOSPADM

## 2021-07-28 RX ORDER — CITRIC ACID/SODIUM CITRATE 334-500MG
30 SOLUTION, ORAL ORAL ONCE
Status: DISCONTINUED | OUTPATIENT
Start: 2021-07-28 | End: 2021-07-28 | Stop reason: HOSPADM

## 2021-07-28 RX ORDER — OXYTOCIN 10 [USP'U]/ML
10 INJECTION, SOLUTION INTRAMUSCULAR; INTRAVENOUS
Status: DISCONTINUED | OUTPATIENT
Start: 2021-07-28 | End: 2021-07-28 | Stop reason: HOSPADM

## 2021-07-28 RX ORDER — MISOPROSTOL 100 UG/1
400 TABLET ORAL
Status: DISCONTINUED | OUTPATIENT
Start: 2021-07-28 | End: 2021-07-30 | Stop reason: HOSPADM

## 2021-07-28 RX ORDER — IBUPROFEN 400 MG/1
800 TABLET, FILM COATED ORAL EVERY 6 HOURS PRN
Status: DISCONTINUED | OUTPATIENT
Start: 2021-07-28 | End: 2021-07-30 | Stop reason: HOSPADM

## 2021-07-28 RX ORDER — ONDANSETRON 2 MG/ML
4 INJECTION INTRAMUSCULAR; INTRAVENOUS EVERY 6 HOURS PRN
Status: DISCONTINUED | OUTPATIENT
Start: 2021-07-28 | End: 2021-07-28 | Stop reason: HOSPADM

## 2021-07-28 RX ORDER — PROCHLORPERAZINE MALEATE 10 MG
10 TABLET ORAL EVERY 6 HOURS PRN
Status: DISCONTINUED | OUTPATIENT
Start: 2021-07-28 | End: 2021-07-28 | Stop reason: HOSPADM

## 2021-07-28 RX ORDER — OXYTOCIN 10 [USP'U]/ML
10 INJECTION, SOLUTION INTRAMUSCULAR; INTRAVENOUS
Status: DISCONTINUED | OUTPATIENT
Start: 2021-07-28 | End: 2021-07-30 | Stop reason: HOSPADM

## 2021-07-28 RX ORDER — METOCLOPRAMIDE 10 MG/1
10 TABLET ORAL EVERY 6 HOURS PRN
Status: DISCONTINUED | OUTPATIENT
Start: 2021-07-28 | End: 2021-07-28 | Stop reason: HOSPADM

## 2021-07-28 RX ORDER — LIDOCAINE 40 MG/G
CREAM TOPICAL
Status: DISCONTINUED | OUTPATIENT
Start: 2021-07-28 | End: 2021-07-28 | Stop reason: HOSPADM

## 2021-07-28 RX ORDER — TRANEXAMIC ACID 10 MG/ML
1 INJECTION, SOLUTION INTRAVENOUS EVERY 30 MIN PRN
Status: DISCONTINUED | OUTPATIENT
Start: 2021-07-28 | End: 2021-07-28 | Stop reason: HOSPADM

## 2021-07-28 RX ORDER — LIDOCAINE HYDROCHLORIDE AND EPINEPHRINE 15; 5 MG/ML; UG/ML
INJECTION, SOLUTION EPIDURAL PRN
Status: DISCONTINUED | OUTPATIENT
Start: 2021-07-28 | End: 2021-07-28

## 2021-07-28 RX ORDER — OXYTOCIN/0.9 % SODIUM CHLORIDE 30/500 ML
340 PLASTIC BAG, INJECTION (ML) INTRAVENOUS CONTINUOUS PRN
Status: DISCONTINUED | OUTPATIENT
Start: 2021-07-28 | End: 2021-07-30 | Stop reason: HOSPADM

## 2021-07-28 RX ORDER — NALBUPHINE HYDROCHLORIDE 10 MG/ML
2.5-5 INJECTION, SOLUTION INTRAMUSCULAR; INTRAVENOUS; SUBCUTANEOUS EVERY 6 HOURS PRN
Status: DISCONTINUED | OUTPATIENT
Start: 2021-07-28 | End: 2021-07-28

## 2021-07-28 RX ORDER — ONDANSETRON 4 MG/1
4 TABLET, ORALLY DISINTEGRATING ORAL EVERY 6 HOURS PRN
Status: DISCONTINUED | OUTPATIENT
Start: 2021-07-28 | End: 2021-07-28 | Stop reason: HOSPADM

## 2021-07-28 RX ORDER — HYDROCORTISONE 2.5 %
CREAM (GRAM) TOPICAL 3 TIMES DAILY PRN
Status: DISCONTINUED | OUTPATIENT
Start: 2021-07-28 | End: 2021-07-30 | Stop reason: HOSPADM

## 2021-07-28 RX ORDER — MODIFIED LANOLIN
OINTMENT (GRAM) TOPICAL
Status: DISCONTINUED | OUTPATIENT
Start: 2021-07-28 | End: 2021-07-30 | Stop reason: HOSPADM

## 2021-07-28 RX ORDER — FENTANYL CITRATE-0.9 % NACL/PF 10 MCG/ML
100 PLASTIC BAG, INJECTION (ML) INTRAVENOUS EVERY 5 MIN PRN
Status: DISCONTINUED | OUTPATIENT
Start: 2021-07-28 | End: 2021-07-28 | Stop reason: HOSPADM

## 2021-07-28 RX ORDER — DOCUSATE SODIUM 100 MG/1
100 CAPSULE, LIQUID FILLED ORAL DAILY
Status: DISCONTINUED | OUTPATIENT
Start: 2021-07-28 | End: 2021-07-30 | Stop reason: HOSPADM

## 2021-07-28 RX ADMIN — IBUPROFEN 800 MG: 400 TABLET ORAL at 22:27

## 2021-07-28 RX ADMIN — LIDOCAINE HYDROCHLORIDE AND EPINEPHRINE 5 ML: 15; 5 INJECTION, SOLUTION EPIDURAL at 15:16

## 2021-07-28 RX ADMIN — LIDOCAINE HYDROCHLORIDE 2 ML: 10 INJECTION, SOLUTION INFILTRATION; PERINEURAL at 15:11

## 2021-07-28 RX ADMIN — SODIUM CHLORIDE, POTASSIUM CHLORIDE, SODIUM LACTATE AND CALCIUM CHLORIDE 1000 ML: 600; 310; 30; 20 INJECTION, SOLUTION INTRAVENOUS at 12:59

## 2021-07-28 RX ADMIN — ONDANSETRON 4 MG: 2 INJECTION INTRAMUSCULAR; INTRAVENOUS at 16:08

## 2021-07-28 RX ADMIN — Medication 10 ML/HR: at 15:21

## 2021-07-28 RX ADMIN — Medication 2 MILLI-UNITS/MIN: at 12:59

## 2021-07-28 RX ADMIN — Medication 100 MCG: at 16:45

## 2021-07-28 RX ADMIN — SODIUM CHLORIDE, POTASSIUM CHLORIDE, SODIUM LACTATE AND CALCIUM CHLORIDE 1000 ML: 600; 310; 30; 20 INJECTION, SOLUTION INTRAVENOUS at 16:08

## 2021-07-28 ASSESSMENT — ACTIVITIES OF DAILY LIVING (ADL)
FALL_HISTORY_WITHIN_LAST_SIX_MONTHS: NO
TOILETING_ISSUES: NO

## 2021-07-28 ASSESSMENT — LIFESTYLE VARIABLES: TOBACCO_USE: 1

## 2021-07-28 ASSESSMENT — MIFFLIN-ST. JEOR: SCORE: 1348.18

## 2021-07-28 NOTE — PROGRESS NOTES
Patient admitted to Room 405 with significant other [employer] for scheduled induction of labor. Patient oriented to room, external monitors applied. See flow sheet for additional admit information.

## 2021-07-28 NOTE — H&P
[place] Labor and Delivery Admission H&P    Noemí Donohue MRN# [id number]   Age: 30 year old YOB: 1990     Date of Admission:  2021    Primary care provider: No Ref-Primary, Physician           Chief Complaint:   Noemí Donohue is a 30 year old  at 38w1d by 9 week US admitted for IOL-IUGR.          Pregnancy history:   This pregnancy has been complicated by IUGR diagnosed in the late second trimester and has been followed closely each week with BPPs and UADs. She was measuring in the 5%ile for a majority of her third trimester and on her last growth US yesterday she was 16%ile overall. As she had been consistently IUGR during the pregnancy, the decision was made to proceed with the original plan for IOL between 38-39 weeks. She is currently 38w1d.    Noemí notes feeling well. She denies any painful contractions, LOF or VB. She endorses good fetal movements.      OBSTETRIC HISTORY:    OB History    Para Term  AB Living   5 3 3 0 1 3   SAB TAB Ectopic Multiple Live Births   1 0 0 0 3      # Outcome Date GA Lbr Sandeep/2nd Weight Sex Delivery Anes PTL Lv   5 Current            4 SAB 10/2020 5w0d          3 Term 16 37w0d 04:48 / 00:18 2.94 kg (6 lb 7.7 oz) F Vag-Spont EPI N RADHA      Name: BG NOEMÍ DONOHUE      Apgar1: 7  Apgar5: 9   2 Term 07/01/15 38w0d / 00:11 3.11 kg (6 lb 13.7 oz) F Vag-Spont EPI N RADHA      Name: Cammie       Apgar1: 9  Apgar5: 9   1 Term 13 40w3d 04:28 / 00:34 3.53 kg (7 lb 12.5 oz) M Vag-Spont Other, EPI N RADHA      Name: LIONEL DONOHUE      Apgar1: 7  Apgar5: 7       PRENATAL LABS:   Lab Results   Component Value Date    ABO A 2021    RH Neg 2021    AS Neg 2021    HEPBANG Nonreactive 2021    HGB 13.5 2021         MEDICATIONS:  Medications Prior to Admission   Medication Sig Dispense Refill Last Dose     cholecalciferol 25 MCG (1000 UT) TABS Take 2,000 Units by mouth        clindamycin-benzoyl peroxide (BENZACLIN)  1-5 % external gel Apply topically 2 times daily 50 g 3      Prenatal Vit-Fe Fumarate-FA (PRENATAL MULTIVITAMIN W/IRON) 27-0.8 MG tablet Take 1 tablet by mouth daily      .        Maternal Past Medical History:     Past Medical History:   Diagnosis Date     Abnormal Pap smear of cervix      History of Hodgkin's lymphoma  She specifically denies asthma, HTN, DM or history of VTE    SURGICAL HISTORY:  Past Surgical History:   Procedure Laterality Date     CHOLECYSTECTOMY       COLPOSCOPY                     GYN HISTORY:  -No history of any prior STIs  -Most recent pap smear per patient was 2021: ASCUS, HPV HR +   Colposcopy performed in 2021 with suspected low-grade changes   Needs pap with HPV testing postpartum    ALLERGIES:  No known medication allergies      Family History:     Specifically denies breast, ovarian, endometrial and colon cancers in her family  She also is not aware of any familial thrombophilias and coagulopathies in her family          Social History:     Social History     Tobacco Use     Smoking status: Former Smoker     Packs/day: 0.25     Quit date: 2020     Years since quittin.6     Smokeless tobacco: Never Used     Tobacco comment: 20: 1-2 per day   Vaping Use     Vaping Use: Every day     Substances: Nicotine, Flavoring     Devices: Pre-filled pod   Substance Use Topics     Alcohol use: Not Currently     Drug use: Not Currently     Types: Methamphetamines     Comment: 2020: over 1 year clean            Review of Systems:   ROS:   Skin: negative for rash, bruising  Eyes: negative for visual blurring, double vision  Ears/Nose/Throat: negative for nasal congestion, vertigo  Respiratory: No shortness of breath, dyspnea on exertion, cough, or hemoptysis  Cardiovascular: negative for palpitations, chest pain, lower extremity edema and syncope or near-syncope  Gastrointestinal: negative for, nausea, vomiting and hematemesis  Genitourinary: negative for, dysuria, frequency  and urgency  Musculoskeletal: negative for, back pain and muscular weakness  Neurologic: negative for, headaches, syncope, seizures and local weakness  Psychiatric: negative for, anxiety, depression and hallucinations  Hematologic/Lymphatic/Immunologic: negative for, anemia, chills and fever         Physical Exam:     Gen: Alert and oriented, No acute distress, well-appearing  CV: Normal heart rate to mild tachycardia with labor, regular rhythm, no audible murmur or gallop  Resp: Lungs clear to auscultation, non-labored respirations  Abd: Soft, gravid, intermittent contractions palpated, no point tenderness  Ext: No sign of asymmetric edema, erythema, or asymmetric size. No pain with movement, Negative Dani's sign    Cervix: 3/50/-2  Membranes: intact  EFW by Leopolds: 3000grams  Presentation:Cephalic    FHT: 140bpm baseline, moderate variability, no accelerations, no decelerations (Cat 1)  Fedora: quiet        Assessment:   Noemí Garcia is a 30 year old  at 38w1d admitted for IOL-IUGR, doing well. This pregnancy is complicated by IUGR, A negative blood type, history of maternal hodgkin's lymphoma.        Plan:       # Term IUP: Labor progress  -- SVE: 3/50/-1  -- Fedora: Quiet  -- Membranes: intact  -- Confirmed cephalic by bsus    # FWB  -- CEFM: 140 bpm baseline, moderate variability, no accelerations, no decelerations (Cat 1)  -- EFW: 3000 gms by leopolds    # Routine Prenatal Care  -- Datin week US MODESTO: 2021  -- PNLs:               A neg blood type: ab screen neg              RPR nr              Hep B S Ag neg              Hep C neg              Rubella Immune              HIV neg              ABO/Rh type              Antibody Screen              Urine culture: negative              GC/Chlam: neg     -- Genetic Screening: declined  -- Anatomy US: EFW 19%ile, CL 3.4 cm, MARQUITA 14.5cm, Normal anatomy  -- Immunizations: Declines flu. Tdap , Rhogam   -- 1 hr GTT: 92  -- 3rd TM labs including CBC,  RPR: RPR nr, Hgb 13.3  -- GBS: negative  -- Postpartum Planning: Plans to try breastfeeding, interested in postpartum tubal     # IUGR  -- US 2021: overall EFW 5%ile (AC 5%ile, BPD 9%ile, HC 15%ile, FL 8%ile)              : normal umbilical artery dopplers (2.5-3.1)  -- Follow up MFM scan on 2021: overall EFW 25%ile, (AC 27%ile)  -- MFM scan 6/15/21: overall EFW 9%ile, AC17%ile)              UAD 1.16  -- Serial Growth US q 3-4 weeks              : 2330 grams, EFW 5%Ile (AC 14%ile, HC 11%ile)              : 2808 gm, EFW 16%ile (AC 30%ile)  -- s/p betamethasone given  and    -- Weekly BPPs with UAD:                  # Rh negative (A neg)  -- Rhogam given      # History of Maternal Hodgkin's Lymphoma: s/p chemotherapy and surgery to remove groin lymph nodes     # History of previous hydronephrosis from kidney stones and nephrostomy tube placement during last two pregnancies      # Abnormal pap  - ASCUS HR HPV+ in 2021  - [place] performed 2021 with low-grade changes suspected at 12 o'clock position. As it is low-grade in appearance and she is pregnant, discussed repeat colposcopy, Pap and HPV testing postpartum.      # Obstetric history      -G1: FAVD, 7lb 15 oz   -G2: , 6lb 14 oz-- required renal stents for hydronephrosis   -G3: , placental bleeding at 14 weeks, 20 week US showed hydrops, Verix vein. Notes polyhydramnios, fluid also pericardial fluid and ascites. When she reached 24 weeks there was discussion about early delivery, but by the next US it had reduced and she was able to be carried to term. Normal delivery and  period. Born Dec 22, 2018 after IOL at 37 weeks. 6lb 8oz. During this pregnancy required renal stents for hydronephrosis   -G4: Miscarriage 4-5 weeks   -G5:  current    # PPH risk  -- moderate    # PP Planning  -- Plans to breastfeed  -- Desires an interval tubal for contraception    # Pain  -- IV pain meds and epidural prn  -- She  desires an epidural later in labor    # Plan  -- Admit to labor and delivery  -- Begin pitocin for IOL as tolerated per protocol   Anticipate AROM when she begins to have a regular contraction pattern  -- SVE as clinically indicated  -- Anticipate       NATALIE GREGG MD on 2021 at 8:17 AM

## 2021-07-28 NOTE — ANESTHESIA PROCEDURE NOTES
Epidural catheter Procedure Note  Pre-Procedure   Staff -        CRNA: Juliane Sneed APRN CRNA       Performed By: CRNA       Location: OB       Procedure Start/Stop Times: 7/28/2021 3:05 PM and 7/28/2021 3:19 PM       Pre-Anesthestic Checklist: patient identified, IV checked, risks and benefits discussed, informed consent, monitors and equipment checked, pre-op evaluation, at physician/surgeon's request and post-op pain management  Timeout:       Correct Patient: Yes        Correct Procedure: Yes        Correct Site: Yes        Correct Position: Yes   Procedure Documentation  Procedure: epidural catheter       Diagnosis: Labor Pain       Patient Position: sitting       Patient Prep/Sterile Barriers: sterile gloves, mask, patient draped       Skin prep: Chloraprep      Local skin infiltrated with 2 mL of.        Insertion Site: L3-4. (midline approach).       Technique: LORT air        GIO at 4.5 cm.       Needle Type: Touhy needle       Needle Gauge: 17.        Needle Length (Inches): 3.5        Catheter: 19 G.         Catheter threaded easily.         6.5 cm epidural space.         Threaded 11 cm at skin.        # of attempts: 1 and  # of redirects:     Assessment/Narrative         Paresthesias: No.       Test dose of 5 mL lidocaine 1.5% w/ 1:200,000 epinephrine at 15:16 CDT.         Test dose negative, 3 minutes after injection, for signs of intravascular, subdural, or intrathecal injection.       Insertion/Infusion Method: LORT air       Aspiration negative for Heme or CSF via Epidural Catheter.

## 2021-07-28 NOTE — PROGRESS NOTES
"Intrapartum Progress Note    S: Patient is comfortable with epidural.     O: /78 (BP Location: Right arm)   Pulse 76   Temp 97  F (36.1  C) (Temporal)   Resp 18   Ht 1.575 m (5' 2\")   Wt 68.5 kg (151 lb)   LMP 2020   SpO2 99%   BMI 27.62 kg/m     Gen: resting in bed, NAD  Cvx: 5/70/-2    FHT: 145, mod variability, no accels, +early decels  [place]: 3-4 contractions in 10 min    Membranes: s/p AROM    A/P: 30 year old  at 38w1d admitted for IOL for IUGR  Hx of Hodgkin's lymphoma: s/p chemotherapy and surgery to remove groin lymph nodes  FWB: Cat I, not reactive. EFW 3000g  Labor: on pitocin per protocol  Pain: epidural  GBS: negative  Rh: negative, needs Rhogam eval PP  Rubella: immune  Continue routine labor management.   Anticipate     Amie Andrew MD 6:23 PM    "

## 2021-07-28 NOTE — ANESTHESIA PREPROCEDURE EVALUATION
Anesthesia Pre-Procedure Evaluation    Patient: Noemí Garcia   MRN: [id number] : 1990        Preoperative Diagnosis: * No surgery found *   Procedure :      Past Medical History:   Diagnosis Date     Abnormal Pap smear of cervix      Cancer (H)      Cerebral infarction (H)      Depressive disorder       Past Surgical History:   Procedure Laterality Date     ABDOMEN SURGERY       BIOPSY       COLONOSCOPY       COLPOSCOPY       GENITOURINARY SURGERY       HEAD & NECK SURGERY        Allergies   Allergen Reactions     Adhesive Tape Other (See Comments)     Blistered skin      Social History     Tobacco Use     Smoking status: Former Smoker     Types: Vaping Device     Smokeless tobacco: Never Used     Tobacco comment: 20: 1-2 per day   Substance Use Topics     Alcohol use: Not Currently      Wt Readings from Last 1 Encounters:   21 68.5 kg (151 lb)        Anesthesia Evaluation   Pt has had prior anesthetic. Type: OB Labor Epidural.    No history of anesthetic complications       ROS/MED HX  ENT/Pulmonary:  - neg pulmonary ROS   (+) tobacco use, Current use,     Neurologic:  - neg neurologic ROS   (+) CVA,     Cardiovascular:  - neg cardiovascular ROS     METS/Exercise Tolerance: >4 METS    Hematologic:  - neg hematologic  ROS     Musculoskeletal:  - neg musculoskeletal ROS     GI/Hepatic:  - neg GI/hepatic ROS     Renal/Genitourinary:     (+) renal disease, type: CRI, Nephrolithiasis ,     Endo:  - neg endo ROS     Psychiatric/Substance Use:     (+) psychiatric history bipolar, depression and anxiety     Infectious Disease:  - neg infectious disease ROS     Malignancy:  - neg malignancy ROS     Other:      (+) Possibly pregnant, ,  (-) previous  and TOLAC candidate       Physical Exam    Airway        Mallampati: II   TM distance: > 3 FB   Neck ROM: full   Mouth opening: > 3 cm    Respiratory Devices and Support         Dental  no notable dental history         Cardiovascular    cardiovascular exam normal       Rhythm and rate: regular and normal     Pulmonary   pulmonary exam normal        breath sounds clear to auscultation           OUTSIDE LABS:  CBC:   Lab Results   Component Value Date    WBC 7.8 07/28/2021    WBC 7.5 07/24/2021    HGB 13.4 07/28/2021    HGB 13.5 07/24/2021    HCT 38.8 07/28/2021    HCT 39.1 07/24/2021     07/28/2021     07/24/2021     BMP:   Lab Results   Component Value Date     07/24/2021     03/25/2021    POTASSIUM 3.8 07/24/2021    POTASSIUM 3.8 03/25/2021    CHLORIDE 106 07/24/2021    CHLORIDE 107 03/25/2021    CO2 20 (L) 07/24/2021    CO2 21 03/25/2021    BUN 7 07/24/2021    BUN 8 03/25/2021    CR 0.49 (L) 07/24/2021    CR 0.53 (L) 03/25/2021    GLC 93 07/24/2021    GLC 98 03/25/2021     COAGS: No results found for: PTT, INR, FIBR  POC: No results found for: BGM, HCG, HCGS  HEPATIC:   Lab Results   Component Value Date    ALBUMIN 3.5 07/24/2021    PROTTOTAL 6.0 (L) 07/24/2021    ALT 11 07/24/2021    AST 14 07/24/2021    ALKPHOS 99 07/24/2021    BILITOTAL 0.6 07/24/2021     OTHER:   Lab Results   Component Value Date    LACT 1.1 02/13/2021    GLENDY 9.4 07/24/2021    LIPASE 15 02/13/2021       Anesthesia Plan    ASA Status:  2   NPO Status:  NPO Appropriate    Anesthesia Type: Epidural.              Consents    Anesthesia Plan(s) and associated risks, benefits, and realistic alternatives discussed. Questions answered and patient/representative(s) expressed understanding.     - Discussed with:  Patient         Postoperative Care            Comments:           neg OB ROS.       WALT SANTOS, ANNE MARIE CRNA

## 2021-07-29 LAB
ABO/RH(D): NORMAL
FBST KIT EXP: NORMAL
FBST LOT #: NORMAL
FETAL BLEED SCREEN: NORMAL
HGB BLD-MCNC: 12.3 G/DL (ref 11.7–15.7)
SPECIMEN EXPIRATION DATE: NORMAL
T PALLIDUM AB SER QL: NONREACTIVE

## 2021-07-29 PROCEDURE — 120N000001 HC R&B MED SURG/OB

## 2021-07-29 PROCEDURE — 85018 HEMOGLOBIN: CPT | Performed by: OBSTETRICS & GYNECOLOGY

## 2021-07-29 PROCEDURE — 3E0234Z INTRODUCTION OF SERUM, TOXOID AND VACCINE INTO MUSCLE, PERCUTANEOUS APPROACH: ICD-10-PCS | Performed by: STUDENT IN AN ORGANIZED HEALTH CARE EDUCATION/TRAINING PROGRAM

## 2021-07-29 PROCEDURE — 250N000011 HC RX IP 250 OP 636: Performed by: OBSTETRICS & GYNECOLOGY

## 2021-07-29 PROCEDURE — 86901 BLOOD TYPING SEROLOGIC RH(D): CPT | Performed by: OBSTETRICS & GYNECOLOGY

## 2021-07-29 PROCEDURE — 250N000013 HC RX MED GY IP 250 OP 250 PS 637: Performed by: OBSTETRICS & GYNECOLOGY

## 2021-07-29 PROCEDURE — 36415 COLL VENOUS BLD VENIPUNCTURE: CPT | Performed by: OBSTETRICS & GYNECOLOGY

## 2021-07-29 RX ADMIN — DOCUSATE SODIUM 100 MG: 100 CAPSULE, LIQUID FILLED ORAL at 14:05

## 2021-07-29 RX ADMIN — IBUPROFEN 800 MG: 400 TABLET ORAL at 14:05

## 2021-07-29 RX ADMIN — ACETAMINOPHEN 650 MG: 325 TABLET, FILM COATED ORAL at 07:32

## 2021-07-29 RX ADMIN — HUMAN RHO(D) IMMUNE GLOBULIN 300 MCG: 1500 SOLUTION INTRAMUSCULAR; INTRAVENOUS at 14:08

## 2021-07-29 RX ADMIN — IBUPROFEN 800 MG: 400 TABLET ORAL at 23:22

## 2021-07-29 NOTE — L&D DELIVERY NOTE
OB Vaginal Delivery Note    Noemí Garcia MRN# [id number]   Age: 30 year old YOB: 1990       GA: 38w1d  GP:   Labor Complications: Fetal Intolerance   EBL:   mL  Delivery QBL:    Delivery Type: Vaginal, Spontaneous   ROM to Delivery Time: (Delivered) Hours: 5 Minutes: 35  [place] Weight: 2.903 kg (6 lb 6.4 oz)    1 Minute 5 Minute 10 Minute   Apgar Totals: 8   9        BRAYDEN BOOKER;SHAUNA SOLORZANO;ARYAN ROMAN;MICHELLE CHAVARRIA     Delivery Details:  Noemí Garcia, a 30 year old  at 38w1d who was admitted for IOL for intrauterine growth restriction. She received pitocin and AROM for labor induction. She progressed slowly to 6cm dilation, then progressed to complete dilation over the next hour. Throughout labor, FHT demonstrated intermittent periods of category II for variable and late decels that would respond to intrauterine resuscitation, maintaining moderate variability throughout. She pushed effectively over one contraction and delivered a vigorous female infant, MADHAV position, without complication. Loose nuchal cord, delivered through. Apgars 8/9. Weight 2903g. Placenta delivered spontaneously and appeared intact. No lacerations    Brayden Booker MD  OB/GYN  2021 7:38 PM         Mara Garcia-Noemí [[id number]]    Labor Event Times    Labor onset date: 21 Onset time:  4:15 PM   Dilation complete date: 21 Complete time:  7:14 PM   Start pushing date/time: 2021 1919      Labor Length    1st Stage (hrs): 2 (min): 59   2nd Stage (hrs): 0 (min): 6   3rd Stage (hrs): 0 (min): 2      Labor Events     labor?: No   steroids: None  Labor Type: AROM  Predominate monitoring during 1st stage: continuous electronic fetal monitoring     Antibiotics received during labor?: No     Rupture date/time: 21 1345   Rupture type: Artificial Rupture of Membranes  Fluid color: Clear  Fluid odor: Normal     Induction: Oxytocin, AROM  Induction date/time:      Cervical ripening date/time:     Indications for induction: Fetal Abnormality     1:1 continuous labor support provided by?: RN Labor partogram used?: no      Delivery/Placenta Date and Time    Delivery Date: 21 Delivery Time:  7:20 PM   Placenta Date/Time: 2021  7:22 PM  Oxytocin given at the time of delivery: after delivery of baby  Delivering clinician: Amie Andrew MD   Other personnel present at delivery:  Provider Role   Amie Andrew MD Obstetrician   Jose Alfredo Roth, RN Delivery Nurse   Carmen Albarado RN Registered Nurse   Michelle Chavarria RN Registered Nurse         Vaginal Counts     Initial count performed by 2 team members:  Two Team Members   Carmen Albarado   Sharmila Vekich       Needles Suture Needles Sponges (RETIRED) Instruments   Initial counts 1 0 6    Added to count 0 0     Relief counts       Final counts 1 0 6          Placed during labor Accounted for at the end of labor   FSE No NA   IUPC No NA   Cervadil No NA              Final count performed by 2 team members:  Two Team Members   Jose Alfredo Andrew RN      Final count correct?: Yes     Apgars    Living status: Living   1 Minute 5 Minute 10 Minute 15 Minute 20 Minute   Skin color: 0  1       Heart rate: 2  2       Reflex irritability: 2  2       Muscle tone: 2  2       Respiratory effort: 2  2       Total: 8  9       Apgars assigned by: MICHELLE CHAVARRIA RN     Cord    Vessels: 3 Vessels    Cord Complications: Nuchal   Nuchal Intervention: delivered through         Nuchal cord description: loose nuchal cord         Cord Blood Disposition: Lab    Gases Sent?: No    Delayed cord clamping?: Yes    Cord Clamping Delay (seconds):  seconds    Stem cell collection?: No       Starks Resuscitation    [place] Care at Delivery:  of viable female  with apgars of 8 and 9. To mothers chest. Crying. Vigorous stimulation. Pinking up.   Output in Delivery Room: Voided      Measurements    Weight: 6 lb  "6.4 oz Length: 1' 6\"   Head circumference: 34.3 cm Chest circumference: 31.8 cm   Output in delivery room: Voided     Skin to Skin and Feeding Plan    Skin to skin initiation date/time: 1/7/1841    Skin to skin with: Mother  Skin to skin end date/time:     Breastfeeding initiated date/time: 7/28/2021 1935     Labor Events and Shoulder Dystocia    Fetal Tracing Prior to Delivery: Category 2  Shoulder dystocia present?: Neg     Delivery (Maternal) (Provider to Complete) ([id number])    Episiotomy: None  Perineal lacerations: None    Repair suture: None  Genital tract inspection done: Pos     Blood Loss  Mother: Noemí Garcia #[id number]   Start of Mother's Information    Delivery Blood Loss  07/28/21 1615 - 07/29/21 0725    Delivery QBL (mL) Hospital Encounter 112 mL    Postpartum QBL (mL) Hospital Encounter 106 mL    Total  218 mL         End of Mother's Information  Mother: Noemí Garcia #[id number]          Delivery - Provider to Complete ([id number])    Delivering clinician: Amie Andrew MD  Delivery Type (Choose the 1 that will go to the Birth History): Vaginal, Spontaneous                   Other personnel:  Provider Role   Amie Andrew MD Obstetrician   Jose Alfredo Roth, RN Delivery Nurse   Carmen Alabrado RN Registered Nurse   Billie Granados RN Registered Nurse                Placenta    Date/Time: 7/28/2021  7:22 PM  Removal: Spontaneous  Disposition: Pathology           Anesthesia    Method: Epidural  Cervical dilation at placement: 4-7                Presentation and Position    Presentation: Vertex    Position: Left Occiput Anterior                 Amie Andrew MD  "

## 2021-07-29 NOTE — PROGRESS NOTES
of viable female  with apgars of 8 and 9. To mothers chest. Crying. Vigorous stimulation. Pinking up. No tear or repairs. [place] removed prior to delivery with 600 mLs of clear urine. Epidural removed post delivery, tip intact. Breastfeeding initiated at 1935. Good latch, strong suck and swallow.     Jose Alfredo Roth RN 21 9:27 PM

## 2021-07-29 NOTE — PROGRESS NOTES
"Patient up to the bathroom and voided 600 mLs of clear yellow urine. FF, midline and 2 below umbilicus. Light bleeding noted, no clots. Rates pain 7/10 of lower abdomen related to cramping of uterus. Vitals stable: /73   Pulse 78   Temp 96.8  F (36  C) (Temporal)   Resp 16   Ht 1.575 m (5' 2\")   Wt 68.5 kg (151 lb)   LMP 2020   SpO2 97%   Breastfeeding Unknown   BMI 27.62 kg/m      2 assist to bathroom. Still residual numbness to right lower leg. Encouraged 1 assist until numbness improves. Linens changed. Breastfeeding going well. Bonding well with . Will continue to monitor.     Jose Alfredo Roth RN 21 12:15 AM        "

## 2021-07-29 NOTE — PROGRESS NOTES
" Postpartum Rounding Progress Note    Ms. Garcia is PPD #1 today after an uncomplicated . She reports feeling well with no acute concerns at this time. Her bleeding has slowed down and she does not endorse any clots. Pain has been well controlled with alternating tylenol and ibuprofen. She has been up and ambulating well, without feeling light-headed or dizzy. Tolerating normal diet, has been able to urinate normally and is passing flatus but has not yet had a bowel movement. No concerns for leg pain or calf pain.  She reports her mood is good. We discussed what to expect as she recovers at home including continued, scant lochia, mood fluctuations and uterine cramping, especially with breastfeeding.       Exam  Vitals:  BP Readings from Last 1 Encounters:   21 106/59     Pulse Readings from Last 1 Encounters:   21 88     Wt Readings from Last 1 Encounters:   21 68.5 kg (151 lb)     Ht Readings from Last 1 Encounters:   21 1.575 m (5' 2\")     Estimated body mass index is 27.62 kg/m  as calculated from the following:    Height as of this encounter: 1.575 m (5' 2\").    Weight as of this encounter: 68.5 kg (151 lb).  Temp Readings from Last 1 Encounters:   21 (!) 96.4  F (35.8  C) (Temporal)       General: No acute distress, well-appearing  CV: Normal rate, no pallor  Lungs: Non-labored respirations  Abdominal: Uterine fundus is firm, midline and just below umbilicus  Extremities: Normal movement, mild, symmetric bilateral lower extremity swelling, no sign of erythema or asymmetry. Negative Dani's bilaterally    Assessment & Plan  Ms. Garcia is a 30 y.o.  s/p  at 38w1d for IOL-IUGR following pregnancy complicated by IUGR, A negative blood type, history of maternal hodgkin's lymphoma.    #PPD 11  --Meeting milestones appropriately  --Lochia bleeding w/o clots  --Pain adequately controlled with PRNs  --Tolerating regular diet and ambulating well    # Abnormal pap  - ASCUS HR " HPV+ in Jan 2021  - [place] performed 2/2/2021 with low-grade changes suspected at 12 o'clock position. As it is low-grade in appearance and she is pregnant, discussed repeat colposcopy, Pap and HPV testing postpartum.    #IWB  --Infant at bedside, doing well per mom  --Breast feeding    #PNL  -- A NEG: rhogam workup  -- Rubella immune, GBS neg    #Contraception  --Discussed options with the patient   --Patient has decided on interval BTL    #Disposition  --Continue routine postpartum care  --Anticipate discharge after 24 hours or tomorrow morning as this would make it a late evening  --Follow up in clinic in 6 weeks: with colposcopy    NATALIE GREGG MD on 7/29/2021 at 12:29 PM

## 2021-07-29 NOTE — PROGRESS NOTES
Patient comfortable after epidural.  Became nauseated and BP decreased at 1610.  Variable and prolonged decel noted with BP decrease.  Pitocin off and Phenylephrine 100mcg administered x 2.  Patient also repositioned.  Patient feeling better after V.S. stable and Zofran 4mg IV given.

## 2021-07-30 ENCOUNTER — HOSPITAL ENCOUNTER (INPATIENT)
Facility: OTHER | Age: 31
LOS: 3 days | Discharge: HOME | End: 2021-08-02
Attending: EMERGENCY MEDICINE | Admitting: OBSTETRICS & GYNECOLOGY
Payer: COMMERCIAL

## 2021-07-30 DIAGNOSIS — Z11.52 ENCOUNTER FOR SCREENING LABORATORY TESTING FOR COVID-19 VIRUS: ICD-10-CM

## 2021-07-30 DIAGNOSIS — R22.1 NECK MASS: ICD-10-CM

## 2021-07-30 DIAGNOSIS — F31.81 BIPOLAR II DISORDER (H): ICD-10-CM

## 2021-07-30 LAB
ALBUMIN SERPL-MCNC: 3.3 G/DL (ref 3.5–5.7)
ALP SERPL-CCNC: 85 U/L (ref 34–104)
ALT SERPL W P-5'-P-CCNC: 12 U/L (ref 7–52)
ANION GAP SERPL CALCULATED.3IONS-SCNC: 10 MMOL/L (ref 3–14)
AST SERPL W P-5'-P-CCNC: 14 U/L (ref 13–39)
BASOPHILS # BLD AUTO: 0 10E3/UL (ref 0–0.2)
BASOPHILS NFR BLD AUTO: 0 %
BILIRUB SERPL-MCNC: 0.6 MG/DL (ref 0.3–1)
BUN SERPL-MCNC: 11 MG/DL (ref 7–25)
CALCIUM SERPL-MCNC: 9.8 MG/DL (ref 8.6–10.3)
CHLORIDE BLD-SCNC: 106 MMOL/L (ref 98–107)
CO2 SERPL-SCNC: 21 MMOL/L (ref 21–31)
CREAT SERPL-MCNC: 0.62 MG/DL (ref 0.6–1.2)
EOSINOPHIL # BLD AUTO: 0.1 10E3/UL (ref 0–0.7)
EOSINOPHIL NFR BLD AUTO: 1 %
ERYTHROCYTE [DISTWIDTH] IN BLOOD BY AUTOMATED COUNT: 12.4 % (ref 10–15)
GFR SERPL CREATININE-BSD FRML MDRD: >90 ML/MIN/1.73M2
GLUCOSE BLD-MCNC: 108 MG/DL (ref 70–105)
HCT VFR BLD AUTO: 37.9 % (ref 35–47)
HGB BLD-MCNC: 13.1 G/DL (ref 11.7–15.7)
IMM GRANULOCYTES # BLD: 0 10E3/UL
IMM GRANULOCYTES NFR BLD: 1 %
LACTATE SERPL-SCNC: 1.3 MMOL/L (ref 0.7–2)
LYMPHOCYTES # BLD AUTO: 0.6 10E3/UL (ref 0.8–5.3)
LYMPHOCYTES NFR BLD AUTO: 8 %
MCH RBC QN AUTO: 29.6 PG (ref 26.5–33)
MCHC RBC AUTO-ENTMCNC: 34.6 G/DL (ref 31.5–36.5)
MCV RBC AUTO: 86 FL (ref 78–100)
MONOCYTES # BLD AUTO: 0.6 10E3/UL (ref 0–1.3)
MONOCYTES NFR BLD AUTO: 8 %
NEUTROPHILS # BLD AUTO: 6.8 10E3/UL (ref 1.6–8.3)
NEUTROPHILS NFR BLD AUTO: 82 %
NRBC # BLD AUTO: 0 10E3/UL
NRBC BLD AUTO-RTO: 0 /100
PLATELET # BLD AUTO: 199 10E3/UL (ref 150–450)
POTASSIUM BLD-SCNC: 3.6 MMOL/L (ref 3.5–5.1)
PROT SERPL-MCNC: 5.9 G/DL (ref 6.4–8.9)
RBC # BLD AUTO: 4.43 10E6/UL (ref 3.8–5.2)
SODIUM SERPL-SCNC: 137 MMOL/L (ref 134–144)
WBC # BLD AUTO: 8.1 10E3/UL (ref 4–11)

## 2021-07-30 PROCEDURE — 81001 URINALYSIS AUTO W/SCOPE: CPT | Performed by: EMERGENCY MEDICINE

## 2021-07-30 PROCEDURE — 36415 COLL VENOUS BLD VENIPUNCTURE: CPT | Performed by: EMERGENCY MEDICINE

## 2021-07-30 PROCEDURE — 96375 TX/PRO/DX INJ NEW DRUG ADDON: CPT | Performed by: EMERGENCY MEDICINE

## 2021-07-30 PROCEDURE — 99285 EMERGENCY DEPT VISIT HI MDM: CPT | Mod: 25 | Performed by: EMERGENCY MEDICINE

## 2021-07-30 PROCEDURE — 87040 BLOOD CULTURE FOR BACTERIA: CPT | Performed by: EMERGENCY MEDICINE

## 2021-07-30 PROCEDURE — 258N000003 HC RX IP 258 OP 636: Performed by: EMERGENCY MEDICINE

## 2021-07-30 PROCEDURE — 99285 EMERGENCY DEPT VISIT HI MDM: CPT | Performed by: EMERGENCY MEDICINE

## 2021-07-30 PROCEDURE — 85025 COMPLETE CBC W/AUTO DIFF WBC: CPT | Performed by: EMERGENCY MEDICINE

## 2021-07-30 PROCEDURE — 80053 COMPREHEN METABOLIC PANEL: CPT | Performed by: EMERGENCY MEDICINE

## 2021-07-30 PROCEDURE — 120N000001 HC R&B MED SURG/OB

## 2021-07-30 PROCEDURE — C9803 HOPD COVID-19 SPEC COLLECT: HCPCS | Performed by: EMERGENCY MEDICINE

## 2021-07-30 PROCEDURE — 250N000013 HC RX MED GY IP 250 OP 250 PS 637: Performed by: EMERGENCY MEDICINE

## 2021-07-30 PROCEDURE — 87086 URINE CULTURE/COLONY COUNT: CPT | Performed by: EMERGENCY MEDICINE

## 2021-07-30 PROCEDURE — 96368 THER/DIAG CONCURRENT INF: CPT | Performed by: EMERGENCY MEDICINE

## 2021-07-30 PROCEDURE — 96365 THER/PROPH/DIAG IV INF INIT: CPT | Performed by: EMERGENCY MEDICINE

## 2021-07-30 PROCEDURE — 250N000011 HC RX IP 250 OP 636: Performed by: EMERGENCY MEDICINE

## 2021-07-30 PROCEDURE — 83605 ASSAY OF LACTIC ACID: CPT | Performed by: EMERGENCY MEDICINE

## 2021-07-30 PROCEDURE — 250N000013 HC RX MED GY IP 250 OP 250 PS 637: Performed by: OBSTETRICS & GYNECOLOGY

## 2021-07-30 PROCEDURE — U0003 INFECTIOUS AGENT DETECTION BY NUCLEIC ACID (DNA OR RNA); SEVERE ACUTE RESPIRATORY SYNDROME CORONAVIRUS 2 (SARS-COV-2) (CORONAVIRUS DISEASE [COVID-19]), AMPLIFIED PROBE TECHNIQUE, MAKING USE OF HIGH THROUGHPUT TECHNOLOGIES AS DESCRIBED BY CMS-2020-01-R: HCPCS | Performed by: EMERGENCY MEDICINE

## 2021-07-30 RX ORDER — SODIUM CHLORIDE 9 MG/ML
INJECTION, SOLUTION INTRAVENOUS CONTINUOUS
Status: DISCONTINUED | OUTPATIENT
Start: 2021-07-30 | End: 2021-08-02 | Stop reason: HOSPADM

## 2021-07-30 RX ORDER — CLINDAMYCIN PHOSPHATE 900 MG/50ML
900 INJECTION, SOLUTION INTRAVENOUS ONCE
Status: COMPLETED | OUTPATIENT
Start: 2021-07-30 | End: 2021-07-31

## 2021-07-30 RX ORDER — ONDANSETRON 2 MG/ML
4 INJECTION INTRAMUSCULAR; INTRAVENOUS EVERY 6 HOURS PRN
Status: DISCONTINUED | OUTPATIENT
Start: 2021-07-30 | End: 2021-08-02 | Stop reason: HOSPADM

## 2021-07-30 RX ORDER — ONDANSETRON 2 MG/ML
4 INJECTION INTRAMUSCULAR; INTRAVENOUS EVERY 30 MIN PRN
Status: DISCONTINUED | OUTPATIENT
Start: 2021-07-30 | End: 2021-07-30

## 2021-07-30 RX ORDER — ACETAMINOPHEN 325 MG/1
650 TABLET ORAL EVERY 4 HOURS PRN
Status: DISCONTINUED | OUTPATIENT
Start: 2021-07-30 | End: 2021-08-02 | Stop reason: HOSPADM

## 2021-07-30 RX ORDER — ONDANSETRON 4 MG/1
4 TABLET, ORALLY DISINTEGRATING ORAL EVERY 6 HOURS PRN
Status: DISCONTINUED | OUTPATIENT
Start: 2021-07-30 | End: 2021-08-02 | Stop reason: HOSPADM

## 2021-07-30 RX ORDER — CLINDAMYCIN PHOSPHATE 900 MG/50ML
900 INJECTION, SOLUTION INTRAVENOUS EVERY 8 HOURS
Status: DISCONTINUED | OUTPATIENT
Start: 2021-07-31 | End: 2021-08-02 | Stop reason: HOSPADM

## 2021-07-30 RX ADMIN — ACETAMINOPHEN 650 MG: 325 TABLET, FILM COATED ORAL at 23:24

## 2021-07-30 RX ADMIN — ONDANSETRON 4 MG: 2 INJECTION INTRAMUSCULAR; INTRAVENOUS at 22:15

## 2021-07-30 RX ADMIN — SODIUM CHLORIDE 1000 ML: 9 INJECTION, SOLUTION INTRAVENOUS at 22:17

## 2021-07-30 RX ADMIN — CLINDAMYCIN PHOSPHATE 900 MG: 900 INJECTION, SOLUTION INTRAVENOUS at 23:02

## 2021-07-30 RX ADMIN — ACETAMINOPHEN 650 MG: 325 TABLET, FILM COATED ORAL at 06:57

## 2021-07-30 RX ADMIN — SODIUM CHLORIDE 340 MG: 900 INJECTION, SOLUTION INTRAVENOUS at 23:19

## 2021-07-30 ASSESSMENT — ENCOUNTER SYMPTOMS
ARTHRALGIAS: 0
DIZZINESS: 0
CHILLS: 1
CHEST TIGHTNESS: 0
DYSURIA: 0
CONSTIPATION: 0
SHORTNESS OF BREATH: 0
FEVER: 1
AGITATION: 0
DIARRHEA: 0
ABDOMINAL PAIN: 1

## 2021-07-30 ASSESSMENT — MIFFLIN-ST. JEOR: SCORE: 1348.18

## 2021-07-30 NOTE — PROGRESS NOTES
" Postpartum Rounding Progress Note    Noemí feels good this morning and has no acute concerns. She has been breastfeeding with the help of nipple shields.  She has been bonding well with baby Giuliana alfonso. She is eager to go home and introduce her to the kids at home.      Exam  Vitals:  BP Readings from Last 1 Encounters:   21 106/65     Pulse Readings from Last 1 Encounters:   21 88     Wt Readings from Last 1 Encounters:   21 68.5 kg (151 lb)     Ht Readings from Last 1 Encounters:   21 1.575 m (5' 2\")     Estimated body mass index is 27.62 kg/m  as calculated from the following:    Height as of this encounter: 1.575 m (5' 2\").    Weight as of this encounter: 68.5 kg (151 lb).  Temp Readings from Last 1 Encounters:   21 97  F (36.1  C)       General: No acute distress, well-appearing  CV: Normal rate, no pallor  Lungs: Non-labored respirations  Abdominal: Uterine fundus is firm, midline and just below umbilicus  Extremities: Normal movement, mild, symmetric bilateral lower extremity swelling, no sign of erythema or asymmetry. Negative Dani's bilaterally    Assessment & Plan  Ms. Garcia is a 30 y.o.  s/p  at 38w1d for IOL-IUGR following pregnancy complicated by IUGR, A negative blood type, history of maternal hodgkin's lymphoma.     #PPD2  --Meeting milestones appropriately  --Lochia bleeding w/o clots  --Pain adequately controlled with PRNs  --Tolerating regular diet and ambulating well     # Abnormal pap  - ASCUS HR HPV+ in 2021  - [place] performed 2021 with low-grade changes suspected at 12 o'clock position. As it is low-grade in appearance and she is pregnant, discussed repeat colposcopy, Pap and HPV testing postpartum.     # IWB  --Baby Giuliana alfonso, doing well per mom  --Breast feeding     # PNL  -- A NEG: rhogam workup  -- Rubella immune, GBS neg     # Contraception  --Discussed options with the patient   --Patient has decided on interval BTL     # " Disposition  --Discharge today  --Follow up in clinic in 6 weeks: will schedule time for colposcopy at that visit

## 2021-07-30 NOTE — ANESTHESIA POSTPROCEDURE EVALUATION
Patient: Noemí Garcia    * No procedures listed *    Diagnosis:* No pre-op diagnosis entered *  Diagnosis Additional Information: No value filed.    Anesthesia Type:  Epidural    Note:  Disposition: Inpatient   Postop Pain Control: Uneventful            Sign Out: Well controlled pain   PONV: No   Neuro/Psych: Uneventful            Sign Out: Acceptable/Baseline neuro status   Airway/Respiratory: Uneventful            Sign Out: Acceptable/Baseline resp. status   CV/Hemodynamics: Uneventful            Sign Out: Acceptable CV status; No obvious hypovolemia; No obvious fluid overload   Other NRE: NONE   DID A NON-ROUTINE EVENT OCCUR? No    Event details/Postop Comments:  Post Epidural Evaluation: Pt up ambulating and has voided without difficulty.  No residual numbness or tingling.  No fever or chills.  Pt still has a backache at her insertion site.  Pt was pleased with her epidural.            Last vitals:  Vitals Value Taken Time   BP     Temp     Pulse     Resp     SpO2         Electronically Signed By: ANNE MARIE Perez CRNA  July 30, 2021  8:08 AM

## 2021-07-30 NOTE — PLAN OF CARE
Baby is breastfeeding well with mom using a nipple shield due to having very tender nipples.  States using the nipple shield helps and she has been breastfeeding baby several times with nipple shield in place.  Providing cares for baby with significant other.  Using Tylenol and Motrin for uterine cramping.  States that meds help with pain control.

## 2021-07-30 NOTE — PLAN OF CARE
"/65   Pulse 88   Temp 97  F (36.1  C)   Resp 16   Ht 1.575 m (5' 2\")   Wt 68.5 kg (151 lb)   LMP 11/03/2020   SpO2 98%   Breastfeeding Unknown   BMI 27.62 kg/m      Patient up independently. C/o back pain, PRN medications given. Fundus firm, bleeding light. Alternating breast feeding and pumping to feed, patient doing so independently. C/o sensitive nipples, shield in use. Denies further needs at this time.   "

## 2021-07-31 VITALS
SYSTOLIC BLOOD PRESSURE: 113 MMHG | BODY MASS INDEX: 27.79 KG/M2 | HEIGHT: 62 IN | WEIGHT: 151 LBS | HEART RATE: 76 BPM | TEMPERATURE: 98.1 F | OXYGEN SATURATION: 98 % | DIASTOLIC BLOOD PRESSURE: 78 MMHG | RESPIRATION RATE: 16 BRPM

## 2021-07-31 LAB
ALBUMIN UR-MCNC: NEGATIVE MG/DL
AMORPH CRY #/AREA URNS HPF: ABNORMAL /HPF
ANION GAP SERPL CALCULATED.3IONS-SCNC: 8 MMOL/L (ref 3–14)
APPEARANCE UR: ABNORMAL
BACTERIA #/AREA URNS HPF: ABNORMAL /HPF
BASOPHILS # BLD AUTO: 0 10E3/UL (ref 0–0.2)
BASOPHILS NFR BLD AUTO: 0 %
BILIRUB UR QL STRIP: NEGATIVE
BUN SERPL-MCNC: 8 MG/DL (ref 7–25)
CALCIUM SERPL-MCNC: 8.3 MG/DL (ref 8.6–10.3)
CHLORIDE BLD-SCNC: 109 MMOL/L (ref 98–107)
CO2 SERPL-SCNC: 21 MMOL/L (ref 21–31)
COLOR UR AUTO: ABNORMAL
CREAT SERPL-MCNC: 0.57 MG/DL (ref 0.6–1.2)
EOSINOPHIL # BLD AUTO: 0.1 10E3/UL (ref 0–0.7)
EOSINOPHIL NFR BLD AUTO: 1 %
ERYTHROCYTE [DISTWIDTH] IN BLOOD BY AUTOMATED COUNT: 12.7 % (ref 10–15)
GFR SERPL CREATININE-BSD FRML MDRD: >90 ML/MIN/1.73M2
GLUCOSE BLD-MCNC: 83 MG/DL (ref 70–105)
GLUCOSE UR STRIP-MCNC: NEGATIVE MG/DL
HCT VFR BLD AUTO: 37 % (ref 35–47)
HGB BLD-MCNC: 12.6 G/DL (ref 11.7–15.7)
HGB UR QL STRIP: ABNORMAL
IMM GRANULOCYTES # BLD: 0 10E3/UL
IMM GRANULOCYTES NFR BLD: 1 %
KETONES UR STRIP-MCNC: NEGATIVE MG/DL
LACTATE SERPL-SCNC: 1.3 MMOL/L (ref 0.7–2)
LEUKOCYTE ESTERASE UR QL STRIP: ABNORMAL
LYMPHOCYTES # BLD AUTO: 0.7 10E3/UL (ref 0.8–5.3)
LYMPHOCYTES NFR BLD AUTO: 12 %
MCH RBC QN AUTO: 30.1 PG (ref 26.5–33)
MCHC RBC AUTO-ENTMCNC: 34.1 G/DL (ref 31.5–36.5)
MCV RBC AUTO: 88 FL (ref 78–100)
MONOCYTES # BLD AUTO: 0.5 10E3/UL (ref 0–1.3)
MONOCYTES NFR BLD AUTO: 8 %
NEUTROPHILS # BLD AUTO: 4.9 10E3/UL (ref 1.6–8.3)
NEUTROPHILS NFR BLD AUTO: 78 %
NITRATE UR QL: NEGATIVE
NRBC # BLD AUTO: 0 10E3/UL
NRBC BLD AUTO-RTO: 0 /100
PH UR STRIP: 7 [PH] (ref 5–9)
PLATELET # BLD AUTO: 172 10E3/UL (ref 150–450)
POTASSIUM BLD-SCNC: 4.1 MMOL/L (ref 3.5–5.1)
RBC # BLD AUTO: 4.19 10E6/UL (ref 3.8–5.2)
RBC URINE: 2 /HPF
SARS-COV-2 RNA RESP QL NAA+PROBE: NEGATIVE
SODIUM SERPL-SCNC: 138 MMOL/L (ref 134–144)
SP GR UR STRIP: 1.01 (ref 1–1.03)
SQUAMOUS EPITHELIAL: 3 /HPF
UROBILINOGEN UR STRIP-MCNC: NORMAL MG/DL
WBC # BLD AUTO: 6.2 10E3/UL (ref 4–11)
WBC URINE: 16 /HPF

## 2021-07-31 PROCEDURE — 83605 ASSAY OF LACTIC ACID: CPT | Performed by: OBSTETRICS & GYNECOLOGY

## 2021-07-31 PROCEDURE — 250N000011 HC RX IP 250 OP 636: Performed by: OBSTETRICS & GYNECOLOGY

## 2021-07-31 PROCEDURE — 80048 BASIC METABOLIC PNL TOTAL CA: CPT | Performed by: EMERGENCY MEDICINE

## 2021-07-31 PROCEDURE — 36415 COLL VENOUS BLD VENIPUNCTURE: CPT | Performed by: OBSTETRICS & GYNECOLOGY

## 2021-07-31 PROCEDURE — 99221 1ST HOSP IP/OBS SF/LOW 40: CPT | Performed by: OBSTETRICS & GYNECOLOGY

## 2021-07-31 PROCEDURE — 250N000011 HC RX IP 250 OP 636: Performed by: EMERGENCY MEDICINE

## 2021-07-31 PROCEDURE — 85025 COMPLETE CBC W/AUTO DIFF WBC: CPT | Performed by: EMERGENCY MEDICINE

## 2021-07-31 PROCEDURE — 258N000003 HC RX IP 258 OP 636: Performed by: OBSTETRICS & GYNECOLOGY

## 2021-07-31 PROCEDURE — 258N000003 HC RX IP 258 OP 636: Performed by: EMERGENCY MEDICINE

## 2021-07-31 PROCEDURE — 120N000001 HC R&B MED SURG/OB

## 2021-07-31 PROCEDURE — 250N000013 HC RX MED GY IP 250 OP 250 PS 637: Performed by: EMERGENCY MEDICINE

## 2021-07-31 PROCEDURE — 36415 COLL VENOUS BLD VENIPUNCTURE: CPT | Performed by: EMERGENCY MEDICINE

## 2021-07-31 PROCEDURE — 250N000013 HC RX MED GY IP 250 OP 250 PS 637: Performed by: OBSTETRICS & GYNECOLOGY

## 2021-07-31 RX ORDER — IBUPROFEN 600 MG/1
600 TABLET, FILM COATED ORAL EVERY 6 HOURS PRN
Status: DISCONTINUED | OUTPATIENT
Start: 2021-07-31 | End: 2021-08-02 | Stop reason: HOSPADM

## 2021-07-31 RX ORDER — AMPICILLIN 2 G/1
2 INJECTION, POWDER, FOR SOLUTION INTRAVENOUS EVERY 6 HOURS
Status: DISCONTINUED | OUTPATIENT
Start: 2021-07-31 | End: 2021-08-02 | Stop reason: HOSPADM

## 2021-07-31 RX ORDER — CLINDAMYCIN AND BENZOYL PEROXIDE 10; 50 MG/G; MG/G
GEL TOPICAL DAILY
COMMUNITY
End: 2021-11-05

## 2021-07-31 RX ADMIN — SODIUM CHLORIDE: 9 INJECTION, SOLUTION INTRAVENOUS at 22:26

## 2021-07-31 RX ADMIN — AMPICILLIN SODIUM 2 G: 2 INJECTION, POWDER, FOR SOLUTION INTRAMUSCULAR; INTRAVENOUS at 20:51

## 2021-07-31 RX ADMIN — SODIUM CHLORIDE: 9 INJECTION, SOLUTION INTRAVENOUS at 10:21

## 2021-07-31 RX ADMIN — ACETAMINOPHEN 650 MG: 325 TABLET, FILM COATED ORAL at 16:22

## 2021-07-31 RX ADMIN — IBUPROFEN 600 MG: 600 TABLET, FILM COATED ORAL at 10:21

## 2021-07-31 RX ADMIN — ACETAMINOPHEN 650 MG: 325 TABLET, FILM COATED ORAL at 07:25

## 2021-07-31 RX ADMIN — CLINDAMYCIN PHOSPHATE 900 MG: 900 INJECTION, SOLUTION INTRAVENOUS at 21:10

## 2021-07-31 RX ADMIN — AMPICILLIN SODIUM 2 G: 2 INJECTION, POWDER, FOR SOLUTION INTRAMUSCULAR; INTRAVENOUS at 16:22

## 2021-07-31 RX ADMIN — ACETAMINOPHEN 650 MG: 325 TABLET, FILM COATED ORAL at 03:43

## 2021-07-31 RX ADMIN — IBUPROFEN 600 MG: 600 TABLET, FILM COATED ORAL at 17:18

## 2021-07-31 RX ADMIN — ACETAMINOPHEN 650 MG: 325 TABLET, FILM COATED ORAL at 22:31

## 2021-07-31 RX ADMIN — GENTAMICIN SULFATE 280 MG: 40 INJECTION, SOLUTION INTRAMUSCULAR; INTRAVENOUS at 22:25

## 2021-07-31 RX ADMIN — CLINDAMYCIN PHOSPHATE 900 MG: 900 INJECTION, SOLUTION INTRAVENOUS at 14:44

## 2021-07-31 RX ADMIN — AMPICILLIN SODIUM 2 G: 2 INJECTION, POWDER, FOR SOLUTION INTRAMUSCULAR; INTRAVENOUS at 10:25

## 2021-07-31 RX ADMIN — SODIUM CHLORIDE: 9 INJECTION, SOLUTION INTRAVENOUS at 00:19

## 2021-07-31 RX ADMIN — CLINDAMYCIN PHOSPHATE 900 MG: 900 INJECTION, SOLUTION INTRAVENOUS at 06:39

## 2021-07-31 ASSESSMENT — ENCOUNTER SYMPTOMS
COUGH: 0
NAUSEA: 0
DIAPHORESIS: 1
DIFFICULTY URINATING: 0
RESPIRATORY NEGATIVE: 1
DYSURIA: 0
ABDOMINAL PAIN: 1
HEMATURIA: 0
SHORTNESS OF BREATH: 0
VOMITING: 0
CHILLS: 1
FATIGUE: 1

## 2021-07-31 ASSESSMENT — ACTIVITIES OF DAILY LIVING (ADL)
ADLS_ACUITY_SCORE: 17

## 2021-07-31 ASSESSMENT — MIFFLIN-ST. JEOR: SCORE: 1306.13

## 2021-07-31 NOTE — ED TRIAGE NOTES
"ED Nursing Triage Note (General)   ________________________________    Noemí Garcia is a 30 year old Female that presents to triage private car  With history of  Recent delivery of baby on Wednesday discharged today and felt sick after getting home.  Fever.  reported by patient   Significant symptoms had onset 12 hour(s) ago.  /81   Pulse 86   Temp (!) 101  F (38.3  C) (Tympanic)   Resp 17   Ht 1.575 m (5' 2\")   Wt 68.5 kg (151 lb)   LMP 11/03/2020   SpO2 98%   Breastfeeding Yes   BMI 27.62 kg/m  t  Patient appears alert , in moderate distress., and cooperative behavior.    GCS Total = 15  Airway: intact  Breathing noted as Normal  Circulation Normal  Skin:  hot  Action taken:  Triage to critical care immediately      PRE HOSPITAL PRIOR LIVING SITUATION Spouse and Children  "

## 2021-07-31 NOTE — H&P
"Noemí Garcia is a 31 yo female admitted through ED last evening for fever on PPD 3. She developed fever at home in spite of use of oral antipyretics yesterday after getting home. Evaluation in ED showed a very tender uterus on clinical exam and fever, but normal labs. This AM she spiked a fever again to 101deg F    Past Medical History:   Diagnosis Date     Abnormal Pap smear of cervix      Cancer (H)      Cerebral infarction (H)      Depressive disorder        Allergies:   Allergies   Allergen Reactions     Adhesive Tape Other (See Comments)     Blistered skin       Active Problems:    Postpartum endometritis    Blood pressure 100/60, pulse 77, temperature 100  F (37.8  C), resp. rate 22, height 1.575 m (5' 2.01\"), weight 64.3 kg (141 lb 11.2 oz), last menstrual period 11/03/2020, SpO2 100 %, currently breastfeeding.    Review of Systems   Constitutional: Positive for chills, diaphoresis and fatigue.   Respiratory: Negative.  Negative for cough and shortness of breath.    Cardiovascular: Negative for chest pain.   Gastrointestinal: Positive for abdominal pain. Negative for nausea and vomiting.   Genitourinary: Positive for pelvic pain. Negative for difficulty urinating, dysuria and hematuria.       Physical Exam  Constitutional:       Appearance: Normal appearance. She is ill-appearing and diaphoretic.   Cardiovascular:      Rate and Rhythm: Normal rate and regular rhythm.   Pulmonary:      Effort: Pulmonary effort is normal.   Abdominal:      General: Abdomen is flat. There is no distension.      Palpations: Abdomen is soft.      Tenderness: There is no abdominal tenderness. There is no guarding.       Skin:     General: Skin is warm.   Neurological:      General: No focal deficit present.      Mental Status: She is alert.   Psychiatric:         Mood and Affect: Mood normal.         Assessment:  Endomyometritis, PPD 3    Plan:  Admit, IVF, IV Gentamicin, clindamycin, and add Ampicillin due to continued fevers for " 12 plus hours now.  Ibuprofen and Tylenol prn fever and pain.  Daily CBC.  Continue hospitalization until afebrile x 24 hours.  Continue to pump and store breast milk or feed infant at the breast as she wishes.    uRsty Odonnell MD  7/31/2021

## 2021-07-31 NOTE — PLAN OF CARE
"Pt A&O x 4, highest fever 101.3 this shift, PRN tylenol and ibuprofen rotated, all other vital signs stable. Lungs diminished in bases, mild sob at beginning of shift, now denies, 98% on RA  Abdomen tender upon palpation of uterus which is firm and at umbilicus, small rubra bleeding present r/t vaginal delivery 7/27/21. Pt pumped x 3 this shift, breast milk in fridge in pt room.  Pt has denied pain all shift, with the exception of sore nipples, lanolin at bedside. ind in room, will continue to monitor.          /73   Pulse 62   Temp (!) 101.3  F (38.5  C) (Tympanic)   Resp 20   Ht 1.575 m (5' 2.01\")   Wt 64.3 kg (141 lb 11.2 oz)   LMP 11/03/2020   SpO2 97%   Breastfeeding Yes   BMI 25.91 kg/m      "

## 2021-07-31 NOTE — PROGRESS NOTES
"Patient was admitted overnight from the ED due to postpartum endometritis. Was given clindamycin and gentamycin in the ED. Clindamycin scheduled Q8H. Patient recently vaginally delivered a  on Wednesday. Has hospital grade breast pump and breast milk fridge in her room. VSS. Blood pressures have been soft. Afebrile. Tylenol for pain control. Lower abdomen and back are tender. More painful when palpated. Skin WDL. Has pink/spotting vaginal discharge that is expected. Denies nausea. Has IVF infusing at 125 ml/hr. Has had generalized weakness.  Adequate urine output. SBA when OOB. Reports dyspnea on exertion. Lung sounds bases diminished. HRR. Will continue to monitor.    /72   Pulse 75   Temp 97.5  F (36.4  C) (Tympanic)   Resp 18   Ht 1.575 m (5' 2.01\")   Wt 64.3 kg (141 lb 11.2 oz)   LMP 2020   SpO2 98%   Breastfeeding Yes   BMI 25.91 kg/m      Timi Aparicio RN on 2021 at 6:23 AM    "

## 2021-07-31 NOTE — PHARMACY-ADMISSION MEDICATION HISTORY
Pharmacy -- Admission Medication Reconciliation    Prior to admission (PTA) medications were reviewed and the patient's PTA medication list was updated.    Sources Consulted: Patient phone interview, Anni    The reliability of this Medication Reconciliation is: Reliability: Reliable    The following significant changes were made:  -Added patient's preferred pharmacy ([facility])  -Updated last (approximate) home administration times, per patient  -Removed Vitamin D  -Re-entered Benzaclin, changed directions (patient uses once daily)  -Provided note on prenatal vitamin (patient reports she takes Presley-brand prenatal- dose is 3 capsules/day)    In addition, the patient's allergies were reviewed with the patient and left as follows:   Allergies: Adhesive tape    The pharmacist has reviewed with the patient that all personal medications should be removed from the building or locked in the belongings safe.  Patient shall only take medications ordered by the physician and administered by the nursing staff.       Medication barriers identified: none noted   Medication adherence concerns: none noted   Understanding of emergency medications: N/A    Andrea Sepulveda RPH, 7/31/2021,  1:18 PM

## 2021-07-31 NOTE — PROGRESS NOTES
"[place] [facility] ADMISSION NOTE    Patient admitted to room 336 at approximately 0215 via ambulation from emergency room. Patient was accompanied by nurse.     Verbal SBAR report received from Dorie Fischer RN prior to patient arrival.     Patient ambulated to bed independently. Patient alert and oriented X 3. Pain is not well controlled.  Medication(s) being used: acetaminophen.  . Admission vital signs: Blood pressure 93/52, pulse 72, temperature 97.3  F (36.3  C), temperature source Tympanic, resp. rate 16, height 1.575 m (5' 2\"), weight 68.5 kg (151 lb), last menstrual period 11/03/2020, SpO2 95 %, currently breastfeeding. Patient was oriented to plan of care, call light, bed controls, tv, telephone, bathroom and visiting hours.     Risk Assessment    The following safety risks were identified during admission: none. Yellow risk band applied: NO.     Skin Initial Assessment    This writer admitted this patient and completed a full skin assessment and Moises score in the Adult PCS flowsheet. Appropriate interventions initiated as needed.        Education    Patient has a [place] to Observation order: No  Observation education completed and documented: N/A      Timi Aparicio RN    "

## 2021-07-31 NOTE — PROGRESS NOTES
Discharge education completed both written and verbal and questions ansered per Sarah KRAMER. Discharged home with baby. Escorted to hospital entrance per myself.

## 2021-07-31 NOTE — ED PROVIDER NOTES
History     Chief Complaint   Patient presents with     Fever     HPI  Noemí Garcia is a 30 year old female who had a normal spontaneous vaginal delivery on 7/28/2021.  Discharged to home yesterday.  She delivered at 38 weeks and 1 day.  Group B is negative.  Pregnancy complicated by IUGR.  This morning she woke up and did not feel well.  She has fevers.  She has body aches all over.  She has an orange vaginal discharge.  She is very tender in the abdomen and pelvis area.    Allergies:  Allergies   Allergen Reactions     Adhesive Tape Other (See Comments)     Blistered skin       Problem List:    Patient Active Problem List    Diagnosis Date Noted     Postpartum endometritis 07/30/2021     Priority: Medium     History of lymphoma 07/28/2021     Priority: Medium     High-risk pregnancy, unspecified trimester 07/28/2021     Priority: Medium     ASCUS with positive high risk HPV cervical 07/28/2021     Priority: Medium     Poor fetal growth affecting management of mother in third trimester, single or unspecified fetus 07/28/2021     Priority: Medium     Lightheadedness 02/28/2020     Priority: Medium     Hodgkin's disease of lymph nodes of multiple sites (H) 11/12/2019     Priority: Medium     Bipolar II disorder, moderate, depressed, with anxious distress (H) 10/05/2018     Priority: Medium     Deviated nasal septum 03/10/2016     Priority: Medium     Generalized anxiety disorder 03/02/2016     Priority: Medium     Kidney stones 06/16/2015     Priority: Medium     Renal colic 05/23/2015     Priority: Medium     Anal fissure 07/10/2013     Priority: Medium        Past Medical History:    Past Medical History:   Diagnosis Date     Abnormal Pap smear of cervix      Cancer (H)      Cerebral infarction (H)      Depressive disorder        Past Surgical History:    Past Surgical History:   Procedure Laterality Date     ABDOMEN SURGERY       BIOPSY       COLONOSCOPY       COLPOSCOPY       GENITOURINARY SURGERY       HEAD &  "NECK SURGERY         Family History:    No family history on file.    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   HENT: Negative for congestion.    Eyes: Negative for visual disturbance.   Respiratory: Negative for chest tightness and shortness of breath.    Cardiovascular: Negative for chest pain.   Gastrointestinal: Positive for abdominal pain. Negative for constipation and diarrhea.        No bowel movement since delivery.  Does not feel constipated.   Genitourinary: Positive for pelvic pain and vaginal discharge. Negative for dysuria.   Musculoskeletal: Negative for arthralgias.   Skin: Negative for rash.   Neurological: Negative for dizziness.   Psychiatric/Behavioral: Negative for agitation.       Physical Exam   BP: 126/81  Pulse: 86  Temp: (!) 101  F (38.3  C)  Resp: 17  Height: 157.5 cm (5' 2\")  Weight: 68.5 kg (151 lb)  SpO2: 98 %      Physical Exam  Vitals and nursing note reviewed.   Constitutional:       Appearance: Normal appearance.   HENT:      Head: Normocephalic and atraumatic.      Mouth/Throat:      Mouth: Mucous membranes are moist.   Eyes:      Conjunctiva/sclera: Conjunctivae normal.   Cardiovascular:      Rate and Rhythm: Normal rate and regular rhythm.      Heart sounds: Normal heart sounds.   Pulmonary: "      Effort: Pulmonary effort is normal.      Breath sounds: Normal breath sounds.   Abdominal:      General: Abdomen is flat. Bowel sounds are normal.      Comments: Decreased but present bowel sounds.  She would not allow me to palpate her abdomen is even light touch is very painful.   Skin:     General: Skin is warm and dry.   Neurological:      Mental Status: She is alert and oriented to person, place, and time.   Psychiatric:         Behavior: Behavior normal.         ED Course        Procedures                  Results for orders placed or performed during the hospital encounter of 07/30/21 (from the past 24 hour(s))   CBC with platelets differential    Narrative    The following orders were created for panel order CBC with platelets differential.  Procedure                               Abnormality         Status                     ---------                               -----------         ------                     CBC with platelets and d...[[id number]]  Abnormal            Final result                 Please view results for these tests on the individual orders.   Comprehensive metabolic panel   Result Value Ref Range    Sodium 137 134 - 144 mmol/L    Potassium 3.6 3.5 - 5.1 mmol/L    Chloride 106 98 - 107 mmol/L    Carbon Dioxide (CO2) 21 21 - 31 mmol/L    Anion Gap 10 3 - 14 mmol/L    Urea Nitrogen 11 7 - 25 mg/dL    Creatinine 0.62 0.60 - 1.20 mg/dL    Calcium 9.8 8.6 - 10.3 mg/dL    Glucose 108 (H) 70 - 105 mg/dL    Alkaline Phosphatase 85 34 - 104 U/L    AST 14 13 - 39 U/L    ALT 12 7 - 52 U/L    Protein Total 5.9 (L) 6.4 - 8.9 g/dL    Albumin 3.3 (L) 3.5 - 5.7 g/dL    Bilirubin Total 0.6 0.3 - 1.0 mg/dL    GFR Estimate >90 >60 mL/min/1.73m2   CBC with platelets and differential   Result Value Ref Range    WBC Count 8.1 4.0 - 11.0 10e3/uL    RBC Count 4.43 3.80 - 5.20 10e6/uL    Hemoglobin 13.1 11.7 - 15.7 g/dL    Hematocrit 37.9 35.0 - 47.0 %    MCV 86 78 - 100 fL    MCH 29.6 26.5 - 33.0 pg    MCHC  34.6 31.5 - 36.5 g/dL    RDW 12.4 10.0 - 15.0 %    Platelet Count 199 150 - 450 10e3/uL    % Neutrophils 82 %    % Lymphocytes 8 %    % Monocytes 8 %    % Eosinophils 1 %    % Basophils 0 %    % Immature Granulocytes 1 %    NRBCs per 100 WBC 0 <1 /100    Absolute Neutrophils 6.8 1.6 - 8.3 10e3/uL    Absolute Lymphocytes 0.6 (L) 0.8 - 5.3 10e3/uL    Absolute Monocytes 0.6 0.0 - 1.3 10e3/uL    Absolute Eosinophils 0.1 0.0 - 0.7 10e3/uL    Absolute Basophils 0.0 0.0 - 0.2 10e3/uL    Absolute Immature Granulocytes 0.0 <=0.0 10e3/uL    Absolute NRBCs 0.0 10e3/uL   Lactic acid whole blood   Result Value Ref Range    Lactic Acid 1.3 0.7 - 2.0 mmol/L   Asymptomatic COVID-19 Virus (Coronavirus) by PCR    Specimen: Swab    Narrative    The following orders were created for panel order Asymptomatic COVID-19 Virus (Coronavirus) by PCR.  Procedure                               Abnormality         Status                     ---------                               -----------         ------                     SARS-COV2 (COVID-19) Vir...[[id number]]                                                   Please view results for these tests on the individual orders.       Medications   0.9% sodium chloride BOLUS (1,000 mLs Intravenous New Bag 7/30/21 2217)     Followed by   sodium chloride 0.9% infusion (has no administration in time range)   clindamycin (CLEOCIN) infusion 900 mg (900 mg Intravenous New Bag 7/30/21 2302)   gentamicin (GARAMYCIN) 340 mg in sodium chloride 0.9 % 100 mL intermittent infusion (340 mg Intravenous Handoff 7/30/21 2313)   ondansetron (ZOFRAN) injection 4 mg (4 mg Intravenous Given 7/30/21 2215)   acetaminophen (TYLENOL) tablet 650 mg (has no administration in time range)   ondansetron (ZOFRAN-ODT) ODT tab 4 mg (has no administration in time range)     Or   ondansetron (ZOFRAN) injection 4 mg (has no administration in time range)   clindamycin (CLEOCIN) infusion 900 mg (has no administration in time range)        Assessments & Plan (with Medical Decision Making)     I have reviewed the nursing notes.    I have reviewed the findings, diagnosis, plan and need for follow up with the patient.  I am concerned about a postpartum endometritis.  She was given some fluids, given IV clindamycin.  She was given gentamicin 5 mg/kg for an initial 24-hour dose.  I spoke with Dr. Odonnell, OB/GYN, who is excepted the patient for admission.  Will go to the OB floor for ongoing fluids and antibiotics.    New Prescriptions    No medications on file       Final diagnoses:   Postpartum endometritis       7/30/2021   [facility] AND \[facility]\""     David Mancini MD  07/30/21 2212

## 2021-08-01 LAB
BASOPHILS # BLD AUTO: 0 10E3/UL (ref 0–0.2)
BASOPHILS NFR BLD AUTO: 0 %
EOSINOPHIL # BLD AUTO: 0.1 10E3/UL (ref 0–0.7)
EOSINOPHIL NFR BLD AUTO: 1 %
ERYTHROCYTE [DISTWIDTH] IN BLOOD BY AUTOMATED COUNT: 12.7 % (ref 10–15)
HCT VFR BLD AUTO: 36.9 % (ref 35–47)
HGB BLD-MCNC: 12.5 G/DL (ref 11.7–15.7)
IMM GRANULOCYTES # BLD: 0 10E3/UL
IMM GRANULOCYTES NFR BLD: 1 %
LYMPHOCYTES # BLD AUTO: 0.9 10E3/UL (ref 0.8–5.3)
LYMPHOCYTES NFR BLD AUTO: 15 %
MCH RBC QN AUTO: 29.7 PG (ref 26.5–33)
MCHC RBC AUTO-ENTMCNC: 33.9 G/DL (ref 31.5–36.5)
MCV RBC AUTO: 88 FL (ref 78–100)
MONOCYTES # BLD AUTO: 0.5 10E3/UL (ref 0–1.3)
MONOCYTES NFR BLD AUTO: 9 %
NEUTROPHILS # BLD AUTO: 4.3 10E3/UL (ref 1.6–8.3)
NEUTROPHILS NFR BLD AUTO: 74 %
NRBC # BLD AUTO: 0 10E3/UL
NRBC BLD AUTO-RTO: 0 /100
PLATELET # BLD AUTO: 174 10E3/UL (ref 150–450)
RBC # BLD AUTO: 4.21 10E6/UL (ref 3.8–5.2)
WBC # BLD AUTO: 5.8 10E3/UL (ref 4–11)

## 2021-08-01 PROCEDURE — 250N000011 HC RX IP 250 OP 636: Performed by: OBSTETRICS & GYNECOLOGY

## 2021-08-01 PROCEDURE — 99231 SBSQ HOSP IP/OBS SF/LOW 25: CPT | Performed by: OBSTETRICS & GYNECOLOGY

## 2021-08-01 PROCEDURE — 250N000013 HC RX MED GY IP 250 OP 250 PS 637: Performed by: OBSTETRICS & GYNECOLOGY

## 2021-08-01 PROCEDURE — 86787 VARICELLA-ZOSTER ANTIBODY: CPT | Performed by: OBSTETRICS & GYNECOLOGY

## 2021-08-01 PROCEDURE — 250N000013 HC RX MED GY IP 250 OP 250 PS 637: Performed by: EMERGENCY MEDICINE

## 2021-08-01 PROCEDURE — 258N000003 HC RX IP 258 OP 636: Performed by: EMERGENCY MEDICINE

## 2021-08-01 PROCEDURE — 258N000003 HC RX IP 258 OP 636: Performed by: OBSTETRICS & GYNECOLOGY

## 2021-08-01 PROCEDURE — 120N000001 HC R&B MED SURG/OB

## 2021-08-01 PROCEDURE — 250N000011 HC RX IP 250 OP 636: Performed by: EMERGENCY MEDICINE

## 2021-08-01 PROCEDURE — 85025 COMPLETE CBC W/AUTO DIFF WBC: CPT | Performed by: OBSTETRICS & GYNECOLOGY

## 2021-08-01 PROCEDURE — 36415 COLL VENOUS BLD VENIPUNCTURE: CPT | Performed by: OBSTETRICS & GYNECOLOGY

## 2021-08-01 RX ORDER — SENNOSIDES 8.6 MG
1-2 TABLET ORAL 2 TIMES DAILY PRN
Status: DISCONTINUED | OUTPATIENT
Start: 2021-08-01 | End: 2021-08-01

## 2021-08-01 RX ORDER — AMOXICILLIN 250 MG
1 CAPSULE ORAL 2 TIMES DAILY
Status: DISCONTINUED | OUTPATIENT
Start: 2021-08-01 | End: 2021-08-02 | Stop reason: HOSPADM

## 2021-08-01 RX ADMIN — AMPICILLIN SODIUM 2 G: 2 INJECTION, POWDER, FOR SOLUTION INTRAMUSCULAR; INTRAVENOUS at 10:49

## 2021-08-01 RX ADMIN — IBUPROFEN 600 MG: 600 TABLET, FILM COATED ORAL at 13:11

## 2021-08-01 RX ADMIN — AMPICILLIN SODIUM 2 G: 2 INJECTION, POWDER, FOR SOLUTION INTRAMUSCULAR; INTRAVENOUS at 22:34

## 2021-08-01 RX ADMIN — SODIUM CHLORIDE: 9 INJECTION, SOLUTION INTRAVENOUS at 20:24

## 2021-08-01 RX ADMIN — ACETAMINOPHEN 650 MG: 325 TABLET, FILM COATED ORAL at 14:55

## 2021-08-01 RX ADMIN — AMPICILLIN SODIUM 2 G: 2 INJECTION, POWDER, FOR SOLUTION INTRAMUSCULAR; INTRAVENOUS at 16:08

## 2021-08-01 RX ADMIN — CLINDAMYCIN PHOSPHATE 900 MG: 900 INJECTION, SOLUTION INTRAVENOUS at 06:07

## 2021-08-01 RX ADMIN — CLINDAMYCIN PHOSPHATE 900 MG: 900 INJECTION, SOLUTION INTRAVENOUS at 14:56

## 2021-08-01 RX ADMIN — GENTAMICIN SULFATE 280 MG: 40 INJECTION, SOLUTION INTRAMUSCULAR; INTRAVENOUS at 22:52

## 2021-08-01 RX ADMIN — IBUPROFEN 600 MG: 600 TABLET, FILM COATED ORAL at 00:21

## 2021-08-01 RX ADMIN — SENNOSIDES 2 TABLET: 8.6 TABLET ORAL at 07:39

## 2021-08-01 RX ADMIN — SODIUM CHLORIDE: 9 INJECTION, SOLUTION INTRAVENOUS at 09:03

## 2021-08-01 RX ADMIN — AMPICILLIN SODIUM 2 G: 2 INJECTION, POWDER, FOR SOLUTION INTRAMUSCULAR; INTRAVENOUS at 04:39

## 2021-08-01 ASSESSMENT — ACTIVITIES OF DAILY LIVING (ADL)
ADLS_ACUITY_SCORE: 17

## 2021-08-01 ASSESSMENT — MIFFLIN-ST. JEOR: SCORE: 1303.41

## 2021-08-01 NOTE — PROGRESS NOTES
SAFETY CHECKLIST  ID Bands and Risk clasps correct and in place (DNR, Fall risk, Allergy, Latex, Limb):  Yes  All Lines Reconciled and labeled correctly: Yes  Whiteboard updated:Yes  Environmental interventions (bed/chair alarm on, call light, side rails, restraints, sitter....): Yes  Verify Tele #:   Na

## 2021-08-01 NOTE — PLAN OF CARE
"Pt is alert and oriented. Independent in room. Denies pain. Had fever of 100.5 given tylenol temp on recheck was 99.8 pt has been alternating tylenol and ibuprofen. Pt has been pumping breast milk and putting it in the fridge in patients room. Denies nipple tenderness. Fundus is 1 below umbilicus. Pt is lethargic. Will continue to monitor.   BP 98/67   Pulse 65   Temp 98  F (36.7  C) (Tympanic)   Resp 16   Ht 1.575 m (5' 2.01\")   Wt 64.3 kg (141 lb 11.2 oz)   LMP 11/03/2020   SpO2 97%   Breastfeeding Yes   BMI 25.91 kg/m      Shahrzad Garrison RN on 8/1/2021 at 5:53 AM     "

## 2021-08-01 NOTE — PLAN OF CARE
"Pt A & O x 4, highest fever 101.2, tylenol and ibuprofen rotated, all other vital stable but pt reports \" those B/P's are high for me. I usually run 100/10.\"  Lungs diminished in bilateral bases, denies sob, pt using incentive spirometer independently.  Fundus is one below umbilicus, abdomen non tender, light rubra bleeding. Nipples tender, pumping and breastfeeding intermittently, breast milk fridge in room. Pt denies pain, ind in room, will continue to monitor.      /78   Pulse 66   Temp 98.3  F (36.8  C) (Tympanic)   Resp 18   Ht 1.575 m (5' 2.01\")   Wt 64 kg (141 lb 1.6 oz)   LMP 11/03/2020   SpO2 97%   Breastfeeding Yes   BMI 25.80 kg/m              "

## 2021-08-01 NOTE — PROGRESS NOTES
"HD 2    S: Patient continues to have temps. COVID neg yesterday. Pain is now improved. Having some issues with constipation. Denies breast engorgement, pain, or redness. Patient is seven years out from chemorx with ABVD for Hodgkins lymphoma in her chest and pelvis.    O: /74   Pulse 74   Temp 98  F (36.7  C) (Tympanic)   Resp 18   Ht 1.575 m (5' 2.01\")   Wt 64 kg (141 lb 1.6 oz)   LMP 2020   SpO2 98%   Breastfeeding Yes   BMI 25.80 kg/m    Temp (24hrs), Av.3  F (37.4  C), Min:97.5  F (36.4  C), Max:101.3  F (38.5  C)  Lab Results   Component Value Date    WBC 5.8 2021    WBC 6.6 2021     Lab Results   Component Value Date    RBC 4.21 2021    RBC 4.40 2021     Lab Results   Component Value Date    HGB 12.5 2021    HGB 13.3 2021     Lab Results   Component Value Date    HCT 36.9 2021    HCT 38.3 2021     No components found for: MCT  Lab Results   Component Value Date    MCV 88 2021    MCV 87 2021     Lab Results   Component Value Date    MCH 29.7 2021    MCH 30.2 2021     Lab Results   Component Value Date    MCHC 33.9 2021    MCHC 34.7 2021     Lab Results   Component Value Date    RDW 12.7 2021    RDW 12.5 2021     Lab Results   Component Value Date     2021     2021         Abdomen: NT, ND, FF below umbilicus and non-tender  Ext: NO TTP in LE, no edema    I/P: HD 2 on abx with fever and likely endomyometritis  Continue triple abx until afebrile x 24 hours.  If not improving consider workup for tick borne illness, recurrence of Hodgkin's, septic pelvic thrombophliebitis    "

## 2021-08-02 ENCOUNTER — HOSPITAL ENCOUNTER (OUTPATIENT)
Dept: OBGYN | Facility: OTHER | Age: 31
End: 2021-08-02
Attending: STUDENT IN AN ORGANIZED HEALTH CARE EDUCATION/TRAINING PROGRAM
Payer: MEDICAID

## 2021-08-02 VITALS
RESPIRATION RATE: 16 BRPM | BODY MASS INDEX: 25.89 KG/M2 | SYSTOLIC BLOOD PRESSURE: 101 MMHG | HEIGHT: 62 IN | OXYGEN SATURATION: 96 % | DIASTOLIC BLOOD PRESSURE: 69 MMHG | HEART RATE: 70 BPM | WEIGHT: 140.7 LBS | TEMPERATURE: 98 F

## 2021-08-02 LAB
BACTERIA UR CULT: NO GROWTH
BASOPHILS # BLD AUTO: 0 10E3/UL (ref 0–0.2)
BASOPHILS NFR BLD AUTO: 0 %
CREAT SERPL-MCNC: 0.53 MG/DL (ref 0.6–1.2)
EOSINOPHIL # BLD AUTO: 0.3 10E3/UL (ref 0–0.7)
EOSINOPHIL NFR BLD AUTO: 4 %
ERYTHROCYTE [DISTWIDTH] IN BLOOD BY AUTOMATED COUNT: 12.7 % (ref 10–15)
GENTAMICIN SERPL-MCNC: 1.2 MG/L
GENTAMICIN SERPL-MCNC: 2.4 MG/L
GENTAMICIN SERPL-MCNC: >12 MG/L
GFR SERPL CREATININE-BSD FRML MDRD: >90 ML/MIN/1.73M2
HCT VFR BLD AUTO: 37.4 % (ref 35–47)
HGB BLD-MCNC: 12.6 G/DL (ref 11.7–15.7)
IMM GRANULOCYTES # BLD: 0 10E3/UL
IMM GRANULOCYTES NFR BLD: 1 %
LYMPHOCYTES # BLD AUTO: 1.6 10E3/UL (ref 0.8–5.3)
LYMPHOCYTES NFR BLD AUTO: 25 %
MCH RBC QN AUTO: 29.4 PG (ref 26.5–33)
MCHC RBC AUTO-ENTMCNC: 33.7 G/DL (ref 31.5–36.5)
MCV RBC AUTO: 87 FL (ref 78–100)
MONOCYTES # BLD AUTO: 0.8 10E3/UL (ref 0–1.3)
MONOCYTES NFR BLD AUTO: 12 %
NEUTROPHILS # BLD AUTO: 3.9 10E3/UL (ref 1.6–8.3)
NEUTROPHILS NFR BLD AUTO: 58 %
NRBC # BLD AUTO: 0 10E3/UL
NRBC BLD AUTO-RTO: 0 /100
PLATELET # BLD AUTO: 191 10E3/UL (ref 150–450)
RBC # BLD AUTO: 4.29 10E6/UL (ref 3.8–5.2)
VZV IGG SER QL IA: 415.6 INDEX
VZV IGG SER QL IA: POSITIVE
WBC # BLD AUTO: 6.6 10E3/UL (ref 4–11)

## 2021-08-02 PROCEDURE — 80170 ASSAY OF GENTAMICIN: CPT | Performed by: OBSTETRICS & GYNECOLOGY

## 2021-08-02 PROCEDURE — 82565 ASSAY OF CREATININE: CPT | Performed by: OBSTETRICS & GYNECOLOGY

## 2021-08-02 PROCEDURE — 80170 ASSAY OF GENTAMICIN: CPT | Performed by: FAMILY MEDICINE

## 2021-08-02 PROCEDURE — 250N000011 HC RX IP 250 OP 636: Performed by: EMERGENCY MEDICINE

## 2021-08-02 PROCEDURE — 258N000003 HC RX IP 258 OP 636: Performed by: EMERGENCY MEDICINE

## 2021-08-02 PROCEDURE — 250N000011 HC RX IP 250 OP 636: Performed by: OBSTETRICS & GYNECOLOGY

## 2021-08-02 PROCEDURE — 99238 HOSP IP/OBS DSCHRG MGMT 30/<: CPT | Performed by: OBSTETRICS & GYNECOLOGY

## 2021-08-02 PROCEDURE — S9443 LACTATION CLASS: HCPCS | Performed by: REGISTERED NURSE

## 2021-08-02 PROCEDURE — 36415 COLL VENOUS BLD VENIPUNCTURE: CPT | Performed by: OBSTETRICS & GYNECOLOGY

## 2021-08-02 PROCEDURE — 85025 COMPLETE CBC W/AUTO DIFF WBC: CPT | Performed by: OBSTETRICS & GYNECOLOGY

## 2021-08-02 PROCEDURE — 36415 COLL VENOUS BLD VENIPUNCTURE: CPT | Performed by: FAMILY MEDICINE

## 2021-08-02 RX ADMIN — SODIUM CHLORIDE: 9 INJECTION, SOLUTION INTRAVENOUS at 06:47

## 2021-08-02 RX ADMIN — CLINDAMYCIN PHOSPHATE 900 MG: 900 INJECTION, SOLUTION INTRAVENOUS at 00:00

## 2021-08-02 RX ADMIN — CLINDAMYCIN PHOSPHATE 900 MG: 900 INJECTION, SOLUTION INTRAVENOUS at 07:38

## 2021-08-02 RX ADMIN — CLINDAMYCIN PHOSPHATE 900 MG: 900 INJECTION, SOLUTION INTRAVENOUS at 13:49

## 2021-08-02 RX ADMIN — AMPICILLIN SODIUM 2 G: 2 INJECTION, POWDER, FOR SOLUTION INTRAMUSCULAR; INTRAVENOUS at 10:12

## 2021-08-02 RX ADMIN — AMPICILLIN SODIUM 2 G: 2 INJECTION, POWDER, FOR SOLUTION INTRAMUSCULAR; INTRAVENOUS at 04:17

## 2021-08-02 ASSESSMENT — ACTIVITIES OF DAILY LIVING (ADL)
ADLS_ACUITY_SCORE: 15
ADLS_ACUITY_SCORE: 17
ADLS_ACUITY_SCORE: 15
ADLS_ACUITY_SCORE: 17
ADLS_ACUITY_SCORE: 15

## 2021-08-02 ASSESSMENT — MIFFLIN-ST. JEOR: SCORE: 1301.59

## 2021-08-02 NOTE — PROGRESS NOTES
"VSS. Patient admitted 7/31 for postpartum endometritis. Has been afebrile throughout the night. Independent. Lung sounds clear, diminished in the bases. Heart rate regular. Denies pain. Fundus is one below umbilicus, abdomen non tender with light rubra bleeding. Continues on gent, clind, amp, and IVF. Patient has hospital grade breast pump in room, along with the breast milk fridge. Boyfriend and babe visit during the day. Bassinet in room. Will continue to monitor.    /85   Pulse 65   Temp 98.5  F (36.9  C) (Tympanic)   Resp 18   Ht 1.575 m (5' 2.01\")   Wt 63.8 kg (140 lb 11.2 oz)   LMP 11/03/2020   SpO2 97%   Breastfeeding Yes   BMI 25.73 kg/m      Timi Aparicio RN on 8/2/2021 at 5:47 AM    "

## 2021-08-02 NOTE — DISCHARGE SUMMARY
[facility] Discharge Summary    Noemí Garcia MRN# [id number]   Age: 30 year old YOB: 1990     Date of Admission:  7/30/2021  Date of Discharge::  8/2/2021   Admitting Physician:  8/2/2021  Discharge Physician:  Rusty Odonnell MD     Home clinic: [facility]          Admission Diagnoses:   Postpartum endometritis [O86.12]          Discharge Diagnosis:   Patient Active Problem List   Same          Procedures:   Procedure(s):  None                  Medications Prior to Admission:     Medications Prior to Admission   Medication Sig Dispense Refill Last Dose     clindamycin-benzoyl peroxide (BENZACLIN) 1-5 % external gel Apply topically daily   Past Week at Unknown time     Prenatal Vit-Fe Fumarate-FA (PRENATAL MULTIVITAMIN W/IRON) 27-0.8 MG tablet Take 1 tablet by mouth daily   7/30/2021 at Unknown time             Discharge Medications:     Current Facility-Administered Medications   Medication     acetaminophen (TYLENOL) tablet 650 mg     ampicillin (OMNIPEN) 2 g vial to attach to  mL bag     clindamycin (CLEOCIN) infusion 900 mg     gentamicin (GARAMYCIN) 280 mg in sodium chloride 0.9 % 100 mL intermittent infusion     ibuprofen (ADVIL/MOTRIN) tablet 600 mg     magnesium hydroxide (MILK OF MAGNESIA) suspension 30 mL     ondansetron (ZOFRAN-ODT) ODT tab 4 mg    Or     ondansetron (ZOFRAN) injection 4 mg     senna-docusate (SENOKOT-S/PERICOLACE) 8.6-50 MG per tablet 1 tablet     sodium chloride 0.9% infusion             Consultations:   No consultations were requested during this admission          Brief History of Illness:   Patient presented on PPD 3 with fever and abdominal tenderness over the fundus. She was hospitalized for antibiotics IV, and anti-pyretics with presumed endomyometritis clinically.           Hospital Course:   The patient's hospital course was unremarkable.  She recovered after 48 hours of gentamicin, clindamycin, and ampicillin. She was afebrile for 24  hours and felt well on dismissal.          Discharge Instructions and Follow-Up:   Discharge diet: Regular   Discharge activity: Activity as tolerated   Discharge follow-up: Follow up with me in 1-2 weeks     Wound care n/a           Discharge Disposition:   Discharged to home      Attestation:  I have reviewed today's vital signs, notes, medications, labs and imaging.    Rusty Odonnell MD

## 2021-08-02 NOTE — PHARMACY - DISCHARGE MEDICATION RECONCILIATION AND EDUCATION
Pharmacy:  Discharge Counseling and Medication Reconciliation    Noemí Garcia  [address]  [place] [address]  [phone] (home)   30 year old female  PCP: No Ref-Primary, Physician    Allergies: Adhesive tape    Discharge Counseling:    Pharmacist did not meet with patient prior to discharge, as patient was not discharged home on any new medications, and no additional changes made to patient's PTA med list at discharge    Summary of Education: N/A- pharmacist did not meet with patient    Materials Provided:  MedCounselor sheets printed from Clinical Pharmacology on: N/A- patient was not prescribed any new medications    Discharge Medication Reconciliation:    It has been determined that the patient has an adequate supply of medications available or which can be obtained from the patient's preferred pharmacy.    Thank you for the consult.    Andrea Sepulveda RPH........August 2, 2021 5:30 PM

## 2021-08-02 NOTE — PROGRESS NOTES
SAFETY CHECKLIST  ID Bands and Risk clasps correct and in place (DNR, Fall risk, Allergy, Latex, Limb):  Yes  All Lines Reconciled and labeled correctly: Yes  Whiteboard updated:Yes  Environmental interventions (bed/chair alarm on, call light, side rails, restraints, sitter....): Yes    Timi Aparicio RN on 8/1/2021 at 7:55 PM

## 2021-08-02 NOTE — PROGRESS NOTES
SAFETY CHECKLIST  ID Bands and Risk clasps correct and in place (DNR, Fall risk, Allergy, Latex, Limb):  Yes  All Lines Reconciled and labeled correctly: Yes  Whiteboard updated:Yes  Environmental interventions (bed/chair alarm on, call light, side rails, restraints, sitter....): Yes

## 2021-08-02 NOTE — PLAN OF CARE
"Patient denies pain. Abdomen is nontender. Light vaginal bleeding; denies discomfort. Independent in room. Consultation with lactation nurse complete. No questions at this time. Patient pumping independently. VSS. Afebrile.     /69   Pulse 70   Temp 98  F (36.7  C) (Tympanic)   Resp 16   Ht 1.575 m (5' 2.01\")   Wt 63.8 kg (140 lb 11.2 oz)   LMP 11/03/2020   SpO2 96%   Breastfeeding Yes   BMI 25.73 kg/m      "

## 2021-08-02 NOTE — PHARMACY-AMINOGLYCOSIDE DOSING SERVICE
"Pharmacy Consult- Gentamicin Assessment    Noemí Garcia is a 30 year old female admitted on 2021.    Vancomycin has been ordered per MD, for the indication of: postpartum endomyometritis    Current Antibiotic Regimen Includes: Gentamicin 280 mg IVPB every 24 hours, clindamycin 900 mg IVPB every 8 hours, and ampicillin 2 g IVPB every 8 hours    Patient Active Problem List   Diagnosis     Hodgkin's disease of lymph nodes of multiple sites (H)     Anal fissure     Bipolar II disorder, moderate, depressed, with anxious distress (H)     Deviated nasal septum     Generalized anxiety disorder     Kidney stones     Lightheadedness     Renal colic     History of lymphoma     High-risk pregnancy, unspecified trimester     ASCUS with positive high risk HPV cervical     Poor fetal growth affecting management of mother in third trimester, single or unspecified fetus     Postpartum endometritis       Allergies (and reaction): Adhesive tape    Most recent flowsheet Weight: 63.8 kg (140 lb 11.2 oz)  Most recent flowsheet Height: 157.5 cm (5' 2.01\")      Intake/Output Summary (Last 24 hours) at 2021 0932  Last data filed at 2021 0615  Gross per 24 hour   Intake 3437 ml   Output 200 ml   Net 3237 ml       Tmax = Temp (24hrs), Av.1  F (37.3  C), Min:98  F (36.7  C), Max:101.2  F (38.4  C)      Recent Labs   Lab Test 21  0105   WBC 6.6       Recent Labs   Lab Test 21  0105 21  0602 21  2217 21  1146   BUN  --  8 11 7   CR 0.53* 0.57* 0.62 0.49*       Gentamicin 21 0105= 12.3 mg/l  9/3/21 0533- 2.4 mg/l  8/3/21 0801= 1.2 mg/l    estimated creatinine clearance is 133.8 mL/min (A) (based on SCr of 0.53 mg/dL (L)).    Cultures Pending: urine, blood    Culture Results: no component results,COVID negative      IBW (kg): 50.10     Dosing wt(kg): 55.6       Estimated Creatinine clearance (ml/min): 120.5        CRCL method: Cockcroft and Gault using ibw(default).    Drug selected: Gentamicin   "  Loading dose (mg):   0   Vd (liters): 13.9   (factor used: 0.25 L/kg)  Bahman (hr-1): 0.367     Half life (hrs):  1.89     Recommended dose:  280 mg     Interval: 24 hrs     Infusion time (hrs): 1  Predicted peak (mcg/mL):  16.9      Predicted trough  (mcg/mL): 0.00           Renal function is stable [ ] /unstable [ ]    ====================  Recommendations:  ====================  Give Gentamicin  280 mg  q 24 hrs with an expected Cpeak of 16.9 mcg/ml and an expected Ctrough of     0.00 mcg/ml      Plan: Using Urban and Marcelo nomogram (and confirmed values with global [facility]), continue gentamicin 280 mg IVPB every 24 hour.    Goal Trough: <0.5 mg/L    Thank You for the consult. Will continue to follow.    Ana Flores RPH ....................  8/2/2021   9:32 AM  Ana Flores RP on 8/2/2021 at 9:46 AM

## 2021-08-02 NOTE — PROGRESS NOTES
"HD3 with postpartum endomyometritis    S: Patient reports feeling well. Last fever was yesterday afternoon. Denies rigors, CP, SOB, foul discharge or sore breasts, no dysuria.    O: /80   Pulse 66   Temp 98  F (36.7  C) (Tympanic)   Resp 18   Ht 1.575 m (5' 2.01\")   Wt 63.8 kg (140 lb 11.2 oz)   LMP 2020   SpO2 96%   Breastfeeding Yes   BMI 25.73 kg/m    Temp (24hrs), Av.1  F (37.3  C), Min:98  F (36.7  C), Max:101.2  F (38.4  C)   Abdomen: NT, ND, FF@U-3cm    Ext: No ttp    I/P: Improving  Likely home this PM if no fevers for 24 hours.    "

## 2021-08-02 NOTE — PROGRESS NOTES
[facility] DISCHARGE NOTE    Patient discharged to home at 5:19 PM via ambulation. Accompanied by spouse and staff. Discharge instructions reviewed with patient, opportunity offered to ask questions. Prescriptions - None ordered for discharge. All belongings sent with patient.    Yesica Hoyso RN

## 2021-08-02 NOTE — LACTATION NOTE
This note was copied from a baby's chart.  Outpatient Lactation Visit    Female-Noemí Garcia  [id number]    Consultation Date: 2021     Reason for Lactation Referral: Initial Lactation Consult    Baby's : 2021    Baby's Current Age: 5 day old  Baby's Gestational Age: Gestational Age: 38w1d    Primary Care Provider: Arcelia Sotomayor    Presenting Problem (concerns as stated by parent): mom is currently hospitalized, so is breastfeeding babe when babe is present and is pumping/bottle feeding breast milk when babe is home with dad    MATERNAL HISTORY   History of Breast Surgery: no  Breast Changes During Pregnancy: no  Breast Feeding History: first time breastfeeding  Maternal Meds: daily prenatal vitamin  Pregnancy Complications: IUGR  Anesthesia during labor: epidural    MATERNAL ASSESSMENT    Breast Size: average, symmetrical, soft after feeding and filling prior to feeding  Nipple Appearance - Left: slightly cracked, with signs of healing, education on further healing techniques provided  Nipple Appearance - Right: slightly cracked, with signs of healing, education on further healing techniques provided  Nipple Erectility - Left: erect with stimulation  Nipple Erectility - Right: erect with stimulation  Areolas Compressibility: soft  Nipple Size: average  Special Equipment Used: 24 mm nipple shield  Day mother reports milk came in:  Day 2    INFANT ASSESSMENT    Oral Anatomy  Mouth: normal  Palate: normal  Jaw: normal  Tongue: normal  Frenulum: normal   Digital Suck Exam: root    FEEDING   Feeding Time: aggressively for 25 minutes  Position:  football  Effort to Latch: awake and alert, latched easily  Duration of Breast Feeding: Right Breast: 10; Left Breast: 15  Results: excellent breast feed    Volume of Intake:    Birth Weight: 6 lb 6.4 oz    Hospital discharge weight: 5 lb 15.9 oz    Today's Weight 6 lb 0.6 oz    Total Intake: 1.5 oz  Output: 4-5 soil diapers in last 24 hours, 4-5 wet diapers in  last 24 hours    LATCH Score:   Latch: 2 - Good Latch  Audible Swallowin - Spontaneous & frequent  Type of Nipple: (Breast/Nipple) 2 - Everted  Comfort: 2 - Soft, Nontender  Hold: 2 - No Assist   Total LATCH Score:  10    FEEDING PLAN    Home Feeding Plan: Continue to feed on demand when  elicits feeding cues with deep latch.  Babe should be eating 8-12 times in a 24 hour period.  Exclusivity explained and encouraged in the early weeks to establish breastfeeding and order in milk supply.  Rooming-in encouraged with explanation of the benefits.  Continue to apply expressed breast milk and Lanolin cream to nipples after feedings for healing and comfort.  Postpartum breastfeeding assessment completed and education provided.  Items included in the education are:     proper positioning and latch    effectiveness of feeding    manual expression    handling and storing breastmilk    maintenance of breastfeeding for the first 6 months    sign/symptoms of infant feeding issues requiring referral to qualified health care provider    LACTATION COMMENTS   Deep latch explained for proper positioning of breast in infant's mouth, maximizing milk transfer and comfort.  Reassurance and encouragement provided in regard to mom's concerns about milk supply.  Follow-up support information provided.  Parents plan to keep [place] Well-Child Check with Dr. Hurley as scheduled for 2 week well child check.      Face-to-face Time: 60 minutes with assessment and education.    Sharmila Saunders RN  2021  9:19 AM

## 2021-08-03 ENCOUNTER — PATIENT OUTREACH (OUTPATIENT)
Dept: CARE COORDINATION | Facility: CLINIC | Age: 31
End: 2021-08-03

## 2021-08-03 NOTE — PROGRESS NOTES
Clinic Care Coordination Contact After short review of chart no TCM call required patient of Dr. Odonnell.  Kimber Do, RN on 8/3/2021 at 8:55 AM

## 2021-08-04 LAB
PATH REPORT.COMMENTS IMP SPEC: NORMAL
PATH REPORT.FINAL DX SPEC: NORMAL
PHOTO IMAGE: NORMAL

## 2021-08-05 LAB
BACTERIA BLD CULT: NO GROWTH
BACTERIA BLD CULT: NO GROWTH

## 2021-08-06 ENCOUNTER — ALLIED HEALTH/NURSE VISIT (OUTPATIENT)
Dept: FAMILY MEDICINE | Facility: OTHER | Age: 31
End: 2021-08-06
Payer: MEDICAID

## 2021-08-06 ENCOUNTER — OFFICE VISIT (OUTPATIENT)
Dept: OBGYN | Facility: OTHER | Age: 31
End: 2021-08-06
Attending: OBSTETRICS & GYNECOLOGY
Payer: MEDICAID

## 2021-08-06 VITALS
HEART RATE: 75 BPM | SYSTOLIC BLOOD PRESSURE: 102 MMHG | RESPIRATION RATE: 20 BRPM | DIASTOLIC BLOOD PRESSURE: 64 MMHG | WEIGHT: 133.8 LBS | BODY MASS INDEX: 24.47 KG/M2

## 2021-08-06 DIAGNOSIS — Z11.1 SCREENING FOR TUBERCULOSIS: Primary | ICD-10-CM

## 2021-08-06 PROCEDURE — 250N000009 HC RX 250: Performed by: FAMILY MEDICINE

## 2021-08-06 PROCEDURE — 99213 OFFICE O/P EST LOW 20 MIN: CPT | Performed by: OBSTETRICS & GYNECOLOGY

## 2021-08-06 PROCEDURE — 86580 TB INTRADERMAL TEST: CPT

## 2021-08-06 PROCEDURE — G0463 HOSPITAL OUTPT CLINIC VISIT: HCPCS | Performed by: OBSTETRICS & GYNECOLOGY

## 2021-08-06 RX ADMIN — TUBERCULIN PURIFIED PROTEIN DERIVATIVE 5 UNITS: 5 INJECTION, SOLUTION INTRADERMAL at 13:32

## 2021-08-06 ASSESSMENT — PAIN SCALES - GENERAL: PAINLEVEL: NO PAIN (0)

## 2021-08-06 NOTE — NURSING NOTE
"Chief Complaint   Patient presents with     Hospital F/U     post partum endometritis   Patient presents to clinic for hospital follow up; postpartum endometritis. No concerns noted.     Initial /64 (BP Location: Right arm, Patient Position: Sitting, Cuff Size: Adult Regular)   Pulse 75   Resp 20   Wt 60.7 kg (133 lb 12.8 oz)   LMP 11/03/2020   BMI 24.47 kg/m   Estimated body mass index is 24.47 kg/m  as calculated from the following:    Height as of 7/31/21: 1.575 m (5' 2.01\").    Weight as of this encounter: 60.7 kg (133 lb 12.8 oz).  Medication Reconciliation: complete    YVETTE AGUILAR, MARCON  "

## 2021-08-06 NOTE — PROGRESS NOTES
MANTOUX ADMINISTRATION    Pt presented to Nurse Injection room today for TB screening, as required by [facility]. Mantoux administered intradermal on Right anterior forearm, per protocol. Appropriate follow-up appointment was not scheduled for evaluation of injection site therefore pt advised to present to [facility] this Sunday after 1315.    Libra Jose RN ....................  8/6/2021   1:33 PM

## 2021-08-06 NOTE — PROGRESS NOTES
Subjective: Patient presents today for follow-up from hospitalization and intravenous antibiotic administration for postpartum endomyometritis.  She relates that she is feeling much better.  Her pain is resolved.  Her bleeding is normal now.  She has had no further fevers since admission.    Objective: /64 (BP Location: Right arm, Patient Position: Sitting, Cuff Size: Adult Regular)   Pulse 75   Resp 20   Wt 60.7 kg (133 lb 12.8 oz)   LMP 11/03/2020   BMI 24.47 kg/m      No acute distress    I/P:  Stable and follow-up from hospitalization for endomyometritis.  She is to call if she develops recurrence of pain or fever.  She is others wise scheduled for routine postpartum follow-up with her primary physician Dr. Deleon.

## 2021-08-08 ENCOUNTER — OFFICE VISIT (OUTPATIENT)
Dept: FAMILY MEDICINE | Facility: OTHER | Age: 31
End: 2021-08-08
Attending: NURSE PRACTITIONER
Payer: MEDICAID

## 2021-08-08 VITALS
WEIGHT: 133.1 LBS | HEIGHT: 62 IN | BODY MASS INDEX: 24.49 KG/M2 | HEART RATE: 76 BPM | TEMPERATURE: 99.7 F | SYSTOLIC BLOOD PRESSURE: 116 MMHG | RESPIRATION RATE: 16 BRPM | DIASTOLIC BLOOD PRESSURE: 76 MMHG

## 2021-08-08 DIAGNOSIS — N61.0 ACUTE MASTITIS OF LEFT BREAST: Primary | ICD-10-CM

## 2021-08-08 PROCEDURE — G0463 HOSPITAL OUTPT CLINIC VISIT: HCPCS

## 2021-08-08 PROCEDURE — 99213 OFFICE O/P EST LOW 20 MIN: CPT | Performed by: NURSE PRACTITIONER

## 2021-08-08 RX ORDER — DICLOXACILLIN SODIUM 500 MG
500 CAPSULE ORAL 4 TIMES DAILY
Qty: 40 CAPSULE | Refills: 0 | Status: SHIPPED | OUTPATIENT
Start: 2021-08-08 | End: 2021-08-18

## 2021-08-08 ASSESSMENT — MIFFLIN-ST. JEOR: SCORE: 1266.99

## 2021-08-08 ASSESSMENT — PAIN SCALES - GENERAL: PAINLEVEL: MILD PAIN (3)

## 2021-08-08 NOTE — PATIENT INSTRUCTIONS
Recommend frequent nursing (or pumping, whichever you are doing) every 2 hours. Massage the area of redness and pain while nursing/pumping and also apply warm compresses (warm, moist washcloths or warm packs) to the site 4 times per day.     Follow up with Sharmila, lactation consultant, and see if she has any further advise for you regarding prevention of future mastitis. Follow up with her or your PCP, or here at [facility] if worsening or persistent concerns or high fevers.

## 2021-08-08 NOTE — NURSING NOTE
"Chief Complaint   Patient presents with     Breast Pain     Left breast      Patient stated her left breast started being sore yesterday, but got red and warm today. She feels there is a little less milk coming out of the left than the right.    Initial /76 (BP Location: Left arm, Patient Position: Sitting, Cuff Size: Adult Regular)   Pulse 76   Temp 99.7  F (37.6  C) (Tympanic)   Resp 16   Ht 1.575 m (5' 2\")   Wt 60.4 kg (133 lb 1.6 oz)   LMP 11/03/2020   Breastfeeding Yes   BMI 24.34 kg/m   Estimated body mass index is 24.34 kg/m  as calculated from the following:    Height as of this encounter: 1.575 m (5' 2\").    Weight as of this encounter: 60.4 kg (133 lb 1.6 oz).  Medication Reconciliation: Completed     Marc Durbin LPN  "

## 2021-08-11 ENCOUNTER — HOSPITAL ENCOUNTER (OUTPATIENT)
Dept: CT IMAGING | Facility: OTHER | Age: 31
Discharge: HOME | End: 2021-08-11
Attending: OTOLARYNGOLOGY | Admitting: OTOLARYNGOLOGY
Payer: MEDICAID

## 2021-08-11 DIAGNOSIS — R22.1 NECK MASS: ICD-10-CM

## 2021-08-11 PROCEDURE — 70491 CT SOFT TISSUE NECK W/DYE: CPT

## 2021-08-11 PROCEDURE — 250N000011 HC RX IP 250 OP 636: Performed by: OTOLARYNGOLOGY

## 2021-08-11 RX ORDER — IOPAMIDOL 755 MG/ML
100 INJECTION, SOLUTION INTRAVASCULAR ONCE
Status: COMPLETED | OUTPATIENT
Start: 2021-08-11 | End: 2021-08-11

## 2021-08-11 RX ADMIN — IOPAMIDOL 100 ML: 755 INJECTION, SOLUTION INTRAVENOUS at 13:44

## 2021-08-11 NOTE — DISCHARGE SUMMARY
Admit date: 2021  Discharge date: 2021    Admit Dx:   - 30 year old  at 38w1d   - IUGR    Discharge Dx:  - Same as above, s/p     Procedures:  -     Admit HPI:  Ms. Noemí Garcia is a   at 38w1d who was admitted for IOL-IUGR on 2021. Please see her admit H&P for full details of her PMH, PSH, Meds, Allergies and exam on admit.    Hospital course:  Noemí Garcia was admitted to the hospital on 2021 for the above listed indications. She had an uncomplicated vaginal delivery.       Discharge/Disposition:  Noemí Garcia was discharged to home in stable condition with the following instructions/medications:      NATALIE GREGG MD on 2021 at 8:14 AM

## 2021-08-13 ENCOUNTER — ALLIED HEALTH/NURSE VISIT (OUTPATIENT)
Dept: FAMILY MEDICINE | Facility: OTHER | Age: 31
End: 2021-08-13
Payer: MEDICAID

## 2021-08-13 DIAGNOSIS — Z11.1 SCREENING FOR TUBERCULOSIS: Primary | ICD-10-CM

## 2021-08-13 PROCEDURE — 250N000009 HC RX 250: Performed by: FAMILY MEDICINE

## 2021-08-13 PROCEDURE — 86580 TB INTRADERMAL TEST: CPT

## 2021-08-13 RX ADMIN — TUBERCULIN PURIFIED PROTEIN DERIVATIVE 5 UNITS: 5 INJECTION, SOLUTION INTRADERMAL at 12:50

## 2021-08-13 NOTE — PROGRESS NOTES
MANTOUX ADMINISTRATION    Pt presented to Nurse Injection room today for TB screening, as required by [facility]. Mantoux administered intradermal on right anterior forearm, per protocol. Pt has appropriate follow-up appointment scheduled for evaluation of injection site.    Libra Jose RN ....................  8/13/2021   12:45 PM

## 2021-08-16 ENCOUNTER — ALLIED HEALTH/NURSE VISIT (OUTPATIENT)
Dept: FAMILY MEDICINE | Facility: OTHER | Age: 31
End: 2021-08-16
Payer: MEDICAID

## 2021-08-16 DIAGNOSIS — Z11.1 SCREENING FOR TUBERCULOSIS: Primary | ICD-10-CM

## 2021-08-16 PROCEDURE — G0463 HOSPITAL OUTPT CLINIC VISIT: HCPCS

## 2021-08-16 NOTE — PROGRESS NOTES
MANTOUX RESULTS READING    Pt presented to Nurse Injection room today for evaluation of Mantoux injection site. Site is 0 mm/ negative.    Florence Benitez RN ....................  8/16/2021   10:47 AM

## 2021-08-17 ENCOUNTER — IMMUNIZATION (OUTPATIENT)
Dept: FAMILY MEDICINE | Facility: OTHER | Age: 31
End: 2021-08-17
Attending: FAMILY MEDICINE
Payer: MEDICAID

## 2021-08-17 PROCEDURE — 91300 PR COVID VAC PFIZER DIL RECON 30 MCG/0.3 ML IM: CPT

## 2021-09-07 ENCOUNTER — IMMUNIZATION (OUTPATIENT)
Dept: FAMILY MEDICINE | Facility: OTHER | Age: 31
End: 2021-09-07
Attending: FAMILY MEDICINE
Payer: COMMERCIAL

## 2021-09-07 PROCEDURE — 0002A PR COVID VAC PFIZER DIL RECON 30 MCG/0.3 ML IM: CPT

## 2021-09-08 ENCOUNTER — PRENATAL OFFICE VISIT (OUTPATIENT)
Dept: OBGYN | Facility: OTHER | Age: 31
End: 2021-09-08
Attending: STUDENT IN AN ORGANIZED HEALTH CARE EDUCATION/TRAINING PROGRAM
Payer: COMMERCIAL

## 2021-09-08 ENCOUNTER — ALLIED HEALTH/NURSE VISIT (OUTPATIENT)
Dept: FAMILY MEDICINE | Facility: OTHER | Age: 31
End: 2021-09-08
Payer: COMMERCIAL

## 2021-09-08 VITALS
OXYGEN SATURATION: 98 % | HEART RATE: 76 BPM | SYSTOLIC BLOOD PRESSURE: 106 MMHG | HEIGHT: 62 IN | BODY MASS INDEX: 23.39 KG/M2 | RESPIRATION RATE: 16 BRPM | DIASTOLIC BLOOD PRESSURE: 78 MMHG | WEIGHT: 127.1 LBS

## 2021-09-08 DIAGNOSIS — Z85.72 HISTORY OF LYMPHOMA: ICD-10-CM

## 2021-09-08 DIAGNOSIS — R87.610 ASCUS WITH POSITIVE HIGH RISK HPV CERVICAL: ICD-10-CM

## 2021-09-08 DIAGNOSIS — R87.810 ASCUS WITH POSITIVE HIGH RISK HPV CERVICAL: ICD-10-CM

## 2021-09-08 DIAGNOSIS — Z23 NEED FOR VACCINATION: Primary | ICD-10-CM

## 2021-09-08 LAB
BASOPHILS # BLD AUTO: 0 10E3/UL (ref 0–0.2)
BASOPHILS NFR BLD AUTO: 0 %
EOSINOPHIL # BLD AUTO: 0.1 10E3/UL (ref 0–0.7)
EOSINOPHIL NFR BLD AUTO: 3 %
ERYTHROCYTE [DISTWIDTH] IN BLOOD BY AUTOMATED COUNT: 11.9 % (ref 10–15)
HCG UR QL: NEGATIVE
HCT VFR BLD AUTO: 43.8 % (ref 35–47)
HGB BLD-MCNC: 14.5 G/DL (ref 11.7–15.7)
HOLD SPECIMEN: NORMAL
IMM GRANULOCYTES # BLD: 0 10E3/UL
IMM GRANULOCYTES NFR BLD: 0 %
LYMPHOCYTES # BLD AUTO: 0.8 10E3/UL (ref 0.8–5.3)
LYMPHOCYTES NFR BLD AUTO: 15 %
MCH RBC QN AUTO: 28.7 PG (ref 26.5–33)
MCHC RBC AUTO-ENTMCNC: 33.1 G/DL (ref 31.5–36.5)
MCV RBC AUTO: 87 FL (ref 78–100)
MONOCYTES # BLD AUTO: 0.3 10E3/UL (ref 0–1.3)
MONOCYTES NFR BLD AUTO: 6 %
NEUTROPHILS # BLD AUTO: 4.1 10E3/UL (ref 1.6–8.3)
NEUTROPHILS NFR BLD AUTO: 76 %
NRBC # BLD AUTO: 0 10E3/UL
NRBC BLD AUTO-RTO: 0 /100
PLATELET # BLD AUTO: 191 10E3/UL (ref 150–450)
RBC # BLD AUTO: 5.05 10E6/UL (ref 3.8–5.2)
WBC # BLD AUTO: 5.5 10E3/UL (ref 4–11)

## 2021-09-08 PROCEDURE — 99207 PR POST-PARTUM 6 WK VISIT - GICH ONLY: CPT | Performed by: STUDENT IN AN ORGANIZED HEALTH CARE EDUCATION/TRAINING PROGRAM

## 2021-09-08 PROCEDURE — 90471 IMMUNIZATION ADMIN: CPT

## 2021-09-08 PROCEDURE — 36415 COLL VENOUS BLD VENIPUNCTURE: CPT | Mod: ZL | Performed by: STUDENT IN AN ORGANIZED HEALTH CARE EDUCATION/TRAINING PROGRAM

## 2021-09-08 PROCEDURE — G0123 SCREEN CERV/VAG THIN LAYER: HCPCS | Performed by: STUDENT IN AN ORGANIZED HEALTH CARE EDUCATION/TRAINING PROGRAM

## 2021-09-08 PROCEDURE — 81025 URINE PREGNANCY TEST: CPT | Mod: ZL | Performed by: STUDENT IN AN ORGANIZED HEALTH CARE EDUCATION/TRAINING PROGRAM

## 2021-09-08 PROCEDURE — 90707 MMR VACCINE SC: CPT

## 2021-09-08 PROCEDURE — 56821 COLPOSCOPY VULVA W/BIOPSY: CPT | Performed by: STUDENT IN AN ORGANIZED HEALTH CARE EDUCATION/TRAINING PROGRAM

## 2021-09-08 PROCEDURE — 88342 IMHCHEM/IMCYTCHM 1ST ANTB: CPT | Mod: 91

## 2021-09-08 PROCEDURE — 87624 HPV HI-RISK TYP POOLED RSLT: CPT | Mod: ZL | Performed by: STUDENT IN AN ORGANIZED HEALTH CARE EDUCATION/TRAINING PROGRAM

## 2021-09-08 PROCEDURE — 85025 COMPLETE CBC W/AUTO DIFF WBC: CPT | Mod: ZL | Performed by: STUDENT IN AN ORGANIZED HEALTH CARE EDUCATION/TRAINING PROGRAM

## 2021-09-08 PROCEDURE — 88305 TISSUE EXAM BY PATHOLOGIST: CPT

## 2021-09-08 RX ORDER — ACETAMINOPHEN AND CODEINE PHOSPHATE 120; 12 MG/5ML; MG/5ML
0.35 SOLUTION ORAL DAILY
Qty: 60 TABLET | Refills: 3 | Status: SHIPPED | OUTPATIENT
Start: 2021-09-08 | End: 2021-10-06

## 2021-09-08 ASSESSMENT — ANXIETY QUESTIONNAIRES
3. WORRYING TOO MUCH ABOUT DIFFERENT THINGS: NOT AT ALL
5. BEING SO RESTLESS THAT IT IS HARD TO SIT STILL: NOT AT ALL
7. FEELING AFRAID AS IF SOMETHING AWFUL MIGHT HAPPEN: NOT AT ALL
GAD7 TOTAL SCORE: 0
GAD7 TOTAL SCORE: 0
6. BECOMING EASILY ANNOYED OR IRRITABLE: NOT AT ALL
2. NOT BEING ABLE TO STOP OR CONTROL WORRYING: NOT AT ALL
8. IF YOU CHECKED OFF ANY PROBLEMS, HOW DIFFICULT HAVE THESE MADE IT FOR YOU TO DO YOUR WORK, TAKE CARE OF THINGS AT HOME, OR GET ALONG WITH OTHER PEOPLE?: NOT DIFFICULT AT ALL
1. FEELING NERVOUS, ANXIOUS, OR ON EDGE: NOT AT ALL
4. TROUBLE RELAXING: NOT AT ALL
GAD7 TOTAL SCORE: 0
7. FEELING AFRAID AS IF SOMETHING AWFUL MIGHT HAPPEN: NOT AT ALL

## 2021-09-08 ASSESSMENT — EDINBURGH POSTNATAL DEPRESSION SCALE (EPDS)
I HAVE LOOKED FORWARD WITH ENJOYMENT TO THINGS: AS MUCH AS I EVER DID
I HAVE BEEN ANXIOUS OR WORRIED FOR NO GOOD REASON: NO, NOT AT ALL
THINGS HAVE BEEN GETTING ON TOP OF ME: NO, MOST OF THE TIME I HAVE COPED QUITE WELL
THE THOUGHT OF HARMING MYSELF HAS OCCURRED TO ME: NEVER
I HAVE BEEN ANXIOUS OR WORRIED FOR NO GOOD REASON: NO, NOT AT ALL
I HAVE FELT SAD OR MISERABLE: NOT VERY OFTEN
TOTAL SCORE: 3
I HAVE BEEN SO UNHAPPY THAT I HAVE BEEN CRYING: NO, NEVER
THINGS HAVE BEEN GETTING ON TOP OF ME: NO, MOST OF THE TIME I HAVE COPED QUITE WELL
I HAVE LOOKED FORWARD WITH ENJOYMENT TO THINGS: AS MUCH AS I EVER DID
I HAVE BEEN SO UNHAPPY THAT I HAVE HAD DIFFICULTY SLEEPING: NOT AT ALL
I HAVE BEEN ABLE TO LAUGH AND SEE THE FUNNY SIDE OF THINGS: AS MUCH AS I ALWAYS COULD
I HAVE FELT SAD OR MISERABLE: NOT VERY OFTEN
I HAVE FELT SCARED OR PANICKY FOR NO GOOD REASON: NO, NOT AT ALL
I HAVE BEEN SO UNHAPPY THAT I HAVE HAD DIFFICULTY SLEEPING: NOT AT ALL
THE THOUGHT OF HARMING MYSELF HAS OCCURRED TO ME: NEVER
I HAVE BEEN SO UNHAPPY THAT I HAVE BEEN CRYING: NO, NEVER
I HAVE BLAMED MYSELF UNNECESSARILY WHEN THINGS WENT WRONG: NOT VERY OFTEN
I HAVE BLAMED MYSELF UNNECESSARILY WHEN THINGS WENT WRONG: NOT VERY OFTEN
I HAVE BEEN ABLE TO LAUGH AND SEE THE FUNNY SIDE OF THINGS: AS MUCH AS I ALWAYS COULD
I HAVE FELT SCARED OR PANICKY FOR NO GOOD REASON: NO, NOT AT ALL

## 2021-09-08 ASSESSMENT — MIFFLIN-ST. JEOR: SCORE: 1239.77

## 2021-09-08 NOTE — PROGRESS NOTES
"Postpartum Office Visit    S: Ms. Garcia is a  who is s/p an  on . Her delivery was uncomplicated. Her postpartum course was complicated by endomyometritis requiring IV antibiotics. She has recovered well since then.      She is feeling well today. She has no acute concerns. She notes her mood has been good, denies depression or any concern for harm to herself or others. She has been breast feeding the baby. She has had return of menses. She has been sexually active. Desires norethindrone until a vasectomy for contraception.     O: /78 (BP Location: Right arm, Patient Position: Sitting, Cuff Size: Adult Regular)   Pulse 76   Resp 16   Ht 1.575 m (5' 2\")   Wt 57.7 kg (127 lb 1.6 oz)   LMP 2020   SpO2 98%   Breastfeeding Yes   BMI 23.25 kg/m    Gen: Well-appearing, NAD  Pelvic: Normal appearing external genitalia, normal hair distribution. Introitus intact. Vagina is moist and pink. There is no vaginal discharge. The cervix is closed and without lesions. Pap smear collected. Bimanual exam reveals a uterus that has returned to 8 week size, mobile, midline, anteverted and no CMT or fundal tenderness.    Answers for HPI/ROS submitted by the patient on 2021  SUN 7 TOTAL SCORE: 0    COLPOSCOPY PROCEDURE NOTE    Ms. Garcia had a pap smear on 2021 which showed ASCUS, HPV HR non 16, 18 positive. She had a colposcopy performed on 2021 which showed low grade changes suspected at 12 o'clock position. No biopsies taken as she was pregnant with plan for follow up colposcopy today/postpartum. Prior to this she has not abnormal Pap smears.    Prior cervical/vaginal disease: Suspected low grade changes, no biopsy taken initially as she was pregnant  Prior cervical treatment: no treatment.    UPT today: negative     Procedure:  Pre-operative diagnosis:    ASCUS, HPV HR non 16, 18 positive  Post-operative diagnosis: Same  EBL: 5 mL  Anesthesia: none    We discussed the colposcopy procedure " in detail and I described the risks/benefits as well as the risks/benefits of acquiring samples for pathology including but not limited to bleeding, cramping and infection. She was in agreement with the plan and signed the consents. Just before the procedure began, we went through a surgical time-out to confirm the patient, procedure to be performed and any allergies.    She was assisted into a dorsal lithotomy position. Examination of the perineum, vulva and vagina all appeared normal. A speculum was gently placed in vagina and adjusted to allow for excellent visualization of cervix. Immediate gross assessment revealed a normal appearing cervix without any lesions, erosions, nodules, masses or extra vascularity. The cervix and upper vagina was then swabbed with acetic acid solution for the next step in evaluation. The colposcope was then adjusted to allow for closer visualization. Mild acetowhite changes were noted at the 12 and 6 o'clock positions. Green light was used to visualize any abnormal vasculature, this showed no abnormal vessels . Lugol's solution was then applied to the cervix and it was inspected again. After Lugol's application, the 12 and 6 o'clock position was noted to have decreased uptake. The transformation zone was clearly visualized for an adequate colposcopy. The decision was made to take a biopsy at the 12 and 6 o'clock positions and ECC. The biopsies were taken without difficulty and sent to pathology for review. Silver Nitrate was used to provide hemostasis from the biopsy site as well as Monsel's solution. Ms. Garcia tolerated the procedure well.    FINDINGS:  Cervix: acetowhite lesion(s) noted at 12 and 6 o'clock; .      IMPRESSION & ASSESSMENT: FOUZIA 1.  -Satisfactory colposcopy with squamocolumnar junction clearly visualized in entirety    -Lesions identified: Yes, at the 12 and 6 o'clock positions  -Vessels: No abnormal vessels noted   -ECC performed?: Yes    PLAN:   -Specimens sent to  pathology  -Will base further treatment on pathology findings.  -Post biopsy instructions given to patient  -Will call to discuss Pathology results when they are available      Assessment:  Ms. Noemí Garcia is a 30 year old yo now  who is s/p  on  following IOL for IUGR. She is doing well in the postpartum period.    Plan:  # Routine postpartum care  -- Feeling well, no concerns  -- breast feeding  -- Contraception goals: [place] until a vasectomy  -- Mood stable and doing well  -- Pap collected today, HPV testing and colposcopy    -----------------------------------  Tawny Deleon MD  OB/GYN   2021 11:28am

## 2021-09-08 NOTE — PROGRESS NOTES
"Patient here for her post partum 6 week check. No bleeding and no concerns noted. Plans for a tubal at some point.       FOOD SECURITY SCREENING QUESTIONS  Hunger Vital Signs:  Within the past 12 months we worried whether our food would run out before we got money to buy more. Never  Within the past 12 months the food we bought just didn't last and we didn't have money to get more. Never    Chief Complaint   Patient presents with     Postpartum Care     6 week check       Initial /78 (BP Location: Right arm, Patient Position: Sitting, Cuff Size: Adult Regular)   Pulse 76   Resp 16   Ht 1.575 m (5' 2\")   Wt 57.7 kg (127 lb 1.6 oz)   LMP 11/03/2020   SpO2 98%   Breastfeeding Yes   BMI 23.25 kg/m   Estimated body mass index is 23.25 kg/m  as calculated from the following:    Height as of this encounter: 1.575 m (5' 2\").    Weight as of this encounter: 57.7 kg (127 lb 1.6 oz).  Medication Reconciliation: complete    Sophie Andrew RN    Answers for HPI/ROS submitted by the patient on 9/8/2021  SUN 7 TOTAL SCORE: 0      "

## 2021-09-08 NOTE — PROGRESS NOTES
Immunization Documentation  Verified patient's first and last name, and . Stated reason for visit today is to receive MMR vaccine. Accompained to clinic visit with self. Denied any concerns with previous immunizations. Allergies reviewed. VIS handout(s) reviewed and given to take home. Pt prepared and administered per standing order. Administration documented in IMMUNIZATIONS (see flowsheet and order for further information). Pt Instructed to wait in lobby for 15 minutes post-injection and notify staff immediately of any reaction.     Libra Jose RN ....................  2021   9:47 AM

## 2021-09-09 ASSESSMENT — ANXIETY QUESTIONNAIRES: GAD7 TOTAL SCORE: 0

## 2021-09-13 LAB
PATH REPORT.COMMENTS IMP SPEC: NORMAL
PATH REPORT.FINAL DX SPEC: NORMAL
PHOTO IMAGE: NORMAL

## 2021-09-14 LAB
HUMAN PAPILLOMA VIRUS 16 DNA: NEGATIVE
HUMAN PAPILLOMA VIRUS 18 DNA: NEGATIVE
HUMAN PAPILLOMA VIRUS FINAL DIAGNOSIS: ABNORMAL
HUMAN PAPILLOMA VIRUS OTHER HR: POSITIVE

## 2021-09-15 LAB
BKR LAB AP GYN ADEQUACY: ABNORMAL
BKR LAB AP GYN INTERPRETATION: ABNORMAL
BKR LAB AP HPV REFLEX: ABNORMAL
BKR LAB AP PREVIOUS ABNL DX: ABNORMAL
BKR LAB AP PREVIOUS ABNORMAL: ABNORMAL
PATH REPORT.RELEVANT HX SPEC: ABNORMAL

## 2021-10-06 ENCOUNTER — OFFICE VISIT (OUTPATIENT)
Dept: FAMILY MEDICINE | Facility: OTHER | Age: 31
End: 2021-10-06
Attending: PHYSICIAN ASSISTANT
Payer: COMMERCIAL

## 2021-10-06 VITALS
TEMPERATURE: 97.2 F | WEIGHT: 121.2 LBS | HEART RATE: 93 BPM | RESPIRATION RATE: 16 BRPM | HEIGHT: 62 IN | BODY MASS INDEX: 22.31 KG/M2 | SYSTOLIC BLOOD PRESSURE: 102 MMHG | OXYGEN SATURATION: 96 % | DIASTOLIC BLOOD PRESSURE: 62 MMHG

## 2021-10-06 DIAGNOSIS — Z23 NEEDS FLU SHOT: ICD-10-CM

## 2021-10-06 DIAGNOSIS — F41.1 GENERALIZED ANXIETY DISORDER: ICD-10-CM

## 2021-10-06 DIAGNOSIS — R22.1 NECK MASS: ICD-10-CM

## 2021-10-06 DIAGNOSIS — Z01.818 PREOP GENERAL PHYSICAL EXAM: Primary | ICD-10-CM

## 2021-10-06 PROBLEM — O36.5930 POOR FETAL GROWTH AFFECTING MANAGEMENT OF MOTHER IN THIRD TRIMESTER, SINGLE OR UNSPECIFIED FETUS: Status: RESOLVED | Noted: 2021-07-28 | Resolved: 2021-10-06

## 2021-10-06 PROBLEM — R42 LIGHTHEADEDNESS: Status: RESOLVED | Noted: 2020-02-28 | Resolved: 2021-10-06

## 2021-10-06 PROBLEM — O09.90 HIGH-RISK PREGNANCY, UNSPECIFIED TRIMESTER: Status: RESOLVED | Noted: 2021-07-28 | Resolved: 2021-10-06

## 2021-10-06 LAB — HCG UR QL: NEGATIVE

## 2021-10-06 PROCEDURE — G0463 HOSPITAL OUTPT CLINIC VISIT: HCPCS | Mod: 25

## 2021-10-06 PROCEDURE — 99214 OFFICE O/P EST MOD 30 MIN: CPT | Performed by: PHYSICIAN ASSISTANT

## 2021-10-06 PROCEDURE — G0008 ADMIN INFLUENZA VIRUS VAC: HCPCS

## 2021-10-06 PROCEDURE — 90686 IIV4 VACC NO PRSV 0.5 ML IM: CPT

## 2021-10-06 PROCEDURE — 81025 URINE PREGNANCY TEST: CPT | Mod: ZL | Performed by: PHYSICIAN ASSISTANT

## 2021-10-06 PROCEDURE — G0463 HOSPITAL OUTPT CLINIC VISIT: HCPCS

## 2021-10-06 ASSESSMENT — ANXIETY QUESTIONNAIRES
8. IF YOU CHECKED OFF ANY PROBLEMS, HOW DIFFICULT HAVE THESE MADE IT FOR YOU TO DO YOUR WORK, TAKE CARE OF THINGS AT HOME, OR GET ALONG WITH OTHER PEOPLE?: SOMEWHAT DIFFICULT
GAD7 TOTAL SCORE: 3
6. BECOMING EASILY ANNOYED OR IRRITABLE: SEVERAL DAYS
5. BEING SO RESTLESS THAT IT IS HARD TO SIT STILL: NOT AT ALL
7. FEELING AFRAID AS IF SOMETHING AWFUL MIGHT HAPPEN: NOT AT ALL
3. WORRYING TOO MUCH ABOUT DIFFERENT THINGS: NOT AT ALL
GAD7 TOTAL SCORE: 3
2. NOT BEING ABLE TO STOP OR CONTROL WORRYING: NOT AT ALL
1. FEELING NERVOUS, ANXIOUS, OR ON EDGE: SEVERAL DAYS
7. FEELING AFRAID AS IF SOMETHING AWFUL MIGHT HAPPEN: NOT AT ALL
4. TROUBLE RELAXING: SEVERAL DAYS
GAD7 TOTAL SCORE: 3

## 2021-10-06 ASSESSMENT — PATIENT HEALTH QUESTIONNAIRE - PHQ9
SUM OF ALL RESPONSES TO PHQ QUESTIONS 1-9: 7
10. IF YOU CHECKED OFF ANY PROBLEMS, HOW DIFFICULT HAVE THESE PROBLEMS MADE IT FOR YOU TO DO YOUR WORK, TAKE CARE OF THINGS AT HOME, OR GET ALONG WITH OTHER PEOPLE: SOMEWHAT DIFFICULT
SUM OF ALL RESPONSES TO PHQ QUESTIONS 1-9: 7

## 2021-10-06 ASSESSMENT — PAIN SCALES - GENERAL: PAINLEVEL: NO PAIN (0)

## 2021-10-06 ASSESSMENT — MIFFLIN-ST. JEOR: SCORE: 1213.01

## 2021-10-06 NOTE — PROGRESS NOTES
[facility] AND [facility]  [address]  [place] [address]  Phone: [phone]  Fax: [phone]  Primary Provider: No Ref-Primary, Physician  Pre-op Performing Provider: JAIME ALCANTARA      PREOPERATIVE EVALUATION:  Today's date: 10/6/2021    Noemí Garcia is a 30 year old female who presents for a preoperative evaluation.    Surgical Information:  Surgery/Procedure: Removal of Sub-Mandibular Gland  Surgery Location: [facility]  Surgeon: Dr. Marquez  Surgery Date: 10/21/21  Time of Surgery: unknown  Where patient plans to recover: At home with family  Fax number for surgical facility: [phone]    Type of Anesthesia Anticipated: to be determined    Assessment & Plan     The proposed surgical procedure is considered INTERMEDIATE risk.    Problem List Items Addressed This Visit        Behavioral    Generalized anxiety disorder      Other Visit Diagnoses     Preop general physical exam    -  Primary    Relevant Orders    Pregnancy, Urine (HCG) (Completed)    Neck mass        Needs flu shot        Relevant Orders    FLU SHOT 6 MOS - 50 YRS (FLUZONE) (Completed)               Risks and Recommendations:  The patient has the following additional risks and recommendations for perioperative complications:   - No identified additional risk factors other than previously addressed    Medication Instructions:  Patient Instructions     Patient should take the following medications the morning of surgery with a small sip of water: hold all meds.  Patient was instructed to hold the following medications the morning of surgery: hold all meds.     Patient was advised to call our office and the surgical services with any change in condition or new symptoms if they were to develop between today and their surgical date.  Especially any cardiopulmonary symptoms or symptoms concerning for an infection.     Discontinue aspirin, aleve, naproxen and ibuprofen 7 days prior to surgery to reduce  bleeding risk.  It is fine to take tylenol the week before surgery.  Hold vitamins and herbal remedies for 7 days before surgery.    Please have a [employer] COVID-19 test on 10/17/2021.   Please stay in your car and call [phone] when you arrive.      Preparing for Your Surgery  Getting started  A nurse will call you to review your health history and instructions. They will give you an arrival time based on your scheduled surgery time.  Please be ready to share the following:    Your doctor's clinic name and phone number    Your medical, surgical and anesthesia history    A list of allergies and sensitivities    A list of medicines, including herbal treatments and over-the-counter drugs    Whether the patient has a legal guardian (ask how to send us the papers in advance)  If you have a child who's having surgery, please ask for a copy of Preparing for Your Child's Surgery.    Preparing for surgery    Within 30 days of surgery: Have a pre-op exam (sometimes called an H&P, or History and Physical). This can be done at a clinic or pre-operative center.  ? If you're having a , you may not need this exam. Talk to your care team    At your pre-op exam, talk to your care team about all medicines you take. If you need to stop any medicines before surgery, ask when to start taking them again.  ? We do this for your safety. Many medicines can make you bleed too much during surgery. Some change how well surgery (anesthesia) drugs work.    Call your insurance company to let them know you're having surgery. (If you don't have insurance, call [phone].)    Call your clinic if there's any change in your health. This includes signs of a cold or flu (sore throat, runny nose, cough, rash, fever). It also includes a scrape or scratch near the surgery site.    If you have questions on the day of surgery, call your hospital or surgery center.  Eating and drinking guidelines  For your safety: Unless your surgeon tells  you otherwise, follow the guidelines below.    Eat and drink as usual until 8 hours before surgery. After that, no food or milk.    Drink clear liquids until 2 hours before surgery. These are liquids you can see through, like water, Gatorade and Propel Water. You may also have black coffee and tea (no cream or milk).    Nothing by mouth within 2 hours of surgery. This includes gum, candy and breath mints.    If you drink, stop drinking alcohol the night before surgery.    If your care team tells you to take medicine on the morning of surgery, it's okay to take it with a sip of water.  Preventing infection    Shower or bathe the night before and morning of your surgery. Follow the instructions your clinic gave you. (If no instructions, use regular soap.)    Don't shave or clip hair near your surgery site. We'll remove the hair if needed.    Don't smoke or vape the morning of surgery. You may chew nicotine gum up to 2 hours before surgery. A nicotine patch is okay.  ? Note: Some surgeries require you to completely quit smoking and nicotine. Check with your surgeon.    Your care team will make every effort to keep you safe from infection. We will:  ? Clean our hands often with soap and water (or an alcohol-based hand rub).  ? Clean the skin at your surgery site with a special soap that kills germs.  ? Give you a special gown to keep you warm. (Cold raises the risk of infection.)  ? Wear special hair covers, masks, gowns and gloves during surgery.  ? Give antibiotic medicine, if prescribed. Not all surgeries need antibiotics.  What to bring on the day of surgery    Photo ID and insurance card    Copy of your health care directive, if you have one    Glasses and hearing aides (bring cases)  ? You can't wear contacts during surgery    Inhaler and eye drops, if you use them (tell us about these when you arrive)    CPAP machine or breathing device, if you use them    A few personal items, if spending the night    If you  have . . .  ? A pacemaker or ICD (cardiac defibrillator): Bring the ID card.  ? An implanted stimulator: Bring the remote control.  ? A legal guardian: Bring a copy of the certified (court-stamped) guardianship papers.  Please remove any jewelry, including body piercings. Leave jewelry and other valuables at home.  If you're going home the day of surgery  Important: If you don't follow the rules below, we must cancel your surgery.     Arrange for someone to drive you home after surgery. You may not drive, take a taxi or take public transportation by yourself (unless you'll have local anesthesia only).    Arrange for a responsible adult to stay with you overnight. If you don't, we may keep you in the hospital overnight, and you may need to pay the costs yourself.  Questions?   If you have any questions for your care team, list them here: _________________________________________________________________________________________________________________________________________________________________________________________________________________________________________________________________________________________________________________________  For informational purposes only. Not to replace the advice of your health care provider. Copyright   2003, 2019 [facility]. All rights reserved. Clinically reviewed by Marta Roche MD. [employer] [id number] - REV 4/20.       RECOMMENDATION:  APPROVAL GIVEN to proceed with proposed procedure, without further diagnostic evaluation.    30 minutes spent on the date of the encounter doing chart review, history and exam, documentation and further activities per the note        Subjective     HPI related to upcoming procedure:   Patient has history of having a submandibular gland that was removed approximately 1 year ago.  Unfortunately it grew back.  They are not going to take out the gland in combination with a cyst.      Preop Questions 10/6/2021   1. Have you ever had  a heart attack or stroke? No   2. Have you ever had surgery on your heart or blood vessels, such as a stent placement, a coronary artery bypass, or surgery on an artery in your head, neck, heart, or legs? No   3. Do you have chest pain with activity? No   4. Do you have a history of  heart failure? No   5. Do you currently have a cold, bronchitis or symptoms of other infection? No   6. Do you have a cough, shortness of breath, or wheezing? No   7. Do you or anyone in your family have previous history of blood clots? No   8. Do you or does anyone in your family have a serious bleeding problem such as prolonged bleeding following surgeries or cuts? No   9. Have you ever had problems with anemia or been told to take iron pills? No   10. Have you had any abnormal blood loss such as black, tarry or bloody stools, or abnormal vaginal bleeding? No   11. Have you ever had a blood transfusion? No   12. Are you willing to have a blood transfusion if it is medically needed before, during, or after your surgery? Yes   13. Have you or any of your relatives ever had problems with anesthesia? No   14. Do you have sleep apnea, excessive snoring or daytime drowsiness? No   15. Do you have any artifical heart valves or other implanted medical devices like a pacemaker, defibrillator, or continuous glucose monitor? No   16. Do you have artificial joints? No   17. Are you allergic to latex? No   18. Is there any chance that you may be pregnant? No       Health Care Directive:  Patient does not have a Health Care Directive or Living Will: Discussed advance care planning with patient; however, patient declined at this time.    Preoperative Review of :   reviewed - controlled substances prescribed by other outside provider(s).      Status of Chronic Conditions:  Anxiety: Patient has history of anxiety that is currently well controlled without medication.  No acute concerns at this time.  Patient feels stable.    Patient has history  of Hodgkin's lymphoma.  Follows with oncology.  Patient is status post chemotherapy, no radiation therapy.  No acute concerns at this time.    Patient has tolerated surgery well in the past.  Patient has no recent cough or cold symptoms.  No recent fevers, chills, sore throat, ear pain, chest pain, palpitations, problems breathing, stomachaches, nausea, vomiting, diarrhea, constipation, blood in stool or urine, dysuria, frequency, urgency.    Patient can walk up a flight of stairs without becoming short of breath or having chest pain: YES   Patient is able to tolerate greater than 4 METs of activity without any cardiopulmonary symptoms.       Review of Systems  CONSTITUTIONAL: NEGATIVE for fever, chills, change in weight  INTEGUMENTARY/SKIN: NEGATIVE for worrisome rashes, moles or lesions  EYES: NEGATIVE for vision changes or irritation  ENT/MOUTH: NEGATIVE for ear, mouth and throat problems  RESP: NEGATIVE for significant cough or SOB  CV: NEGATIVE for chest pain, palpitations or peripheral edema  GI: NEGATIVE for nausea, abdominal pain, heartburn, or change in bowel habits  : NEGATIVE for frequency, dysuria, or hematuria  MUSCULOSKELETAL: NEGATIVE for significant arthralgias or myalgia  NEURO: NEGATIVE for weakness, dizziness or paresthesias  ENDOCRINE: NEGATIVE for temperature intolerance, skin/hair changes  HEME: NEGATIVE for bleeding problems  PSYCHIATRIC: NEGATIVE for changes in mood or affect    Patient Active Problem List    Diagnosis Date Noted     Postpartum endometritis 07/30/2021     Priority: Medium     History of lymphoma 07/28/2021     Priority: Medium     ASCUS with positive high risk HPV cervical 07/28/2021     Priority: Medium     Hodgkin's disease of lymph nodes of multiple sites (H) 11/12/2019     Priority: Medium     Bipolar II disorder, moderate, depressed, with anxious distress (H) 10/05/2018     Priority: Medium     Deviated nasal septum 03/10/2016     Priority: Medium     Generalized  "anxiety disorder 03/02/2016     Priority: Medium     Kidney stones 06/16/2015     Priority: Medium     Renal colic 05/23/2015     Priority: Medium     Anal fissure 07/10/2013     Priority: Medium      Past Medical History:   Diagnosis Date     Abnormal Pap smear of cervix      Cancer (H)      Cerebral infarction (H)      Depressive disorder      Past Surgical History:   Procedure Laterality Date     AS REMOVE GROIN LYMPH NODES       BONE MARROW BIOPSY       COLONOSCOPY       COLPOSCOPY       EXTRACORPOREAL SHOCK WAVE LITHOTRIPSY, CYSTOSCOPY, INSERT STENT URETER(S), COMBINED       GALLBLADDER SURGERY       HEAD & NECK SURGERY      cyst removal     KIDNEY STONE SURGERY       RHINOPLASTY       Current Outpatient Medications   Medication Sig Dispense Refill     clindamycin-benzoyl peroxide (BENZACLIN) 1-5 % external gel Apply topically daily       Prenatal Vit-Fe Fumarate-FA (PRENATAL MULTIVITAMIN W/IRON) 27-0.8 MG tablet Take 1 tablet by mouth daily         Allergies   Allergen Reactions     Adhesive Tape Other (See Comments)     Blistered skin        Social History     Tobacco Use     Smoking status: Former Smoker     Types: Vaping Device     Smokeless tobacco: Never Used     Tobacco comment: 12/23/20: 1-2 per day   Substance Use Topics     Alcohol use: Not Currently     Family History   Problem Relation Age of Onset     No Known Problems Mother      Heart Disease Father         MIs     No Known Problems Sister      No Known Problems Sister      No Known Problems Brother      No Known Problems Brother      No Known Problems Brother      Alzheimer Disease Maternal Grandmother      Heart Disease Maternal Grandfather         MI or stroke?     Heart Disease Paternal Grandmother      History   Drug Use Unknown     Comment: October 2019: over 2 years clean         Objective     /62   Pulse 93   Temp 97.2  F (36.2  C) (Tympanic)   Resp 16   Ht 1.575 m (5' 2\")   Wt 55 kg (121 lb 3.2 oz)   LMP 10/03/2021 " (Approximate)   SpO2 96%   Breastfeeding Yes   BMI 22.17 kg/m      Physical Exam    GENERAL APPEARANCE: healthy, alert and no distress     EYES: EOMI, PERRL     HENT: ear canals and TM's normal and nose and mouth without ulcers or lesions     NECK: no adenopathy, no asymmetry, masses, or scars and thyroid normal to palpation     RESP: lungs clear to auscultation - no rales, rhonchi or wheezes     CV: regular rates and rhythm, normal S1 S2, no S3 or S4 and no murmur, click or rub     ABDOMEN:  soft, nontender, no HSM or masses and bowel sounds normal     MS: extremities normal- no gross deformities noted, no evidence of inflammation in joints, FROM in all extremities.     SKIN: no suspicious lesions or rashes     NEURO: Normal strength and tone, sensory exam grossly normal, mentation intact and speech normal     PSYCH: mentation appears normal. and affect normal/bright     LYMPHATICS: No cervical adenopathy    Recent Labs   Lab Test 09/08/21  1212 08/02/21  0105 08/01/21  0619 07/31/21  0602 07/30/21  2217 07/30/21  2217   HGB 14.5 12.6   < > 12.6   < > 13.1    191   < > 172   < > 199   NA  --   --   --  138  --  137   POTASSIUM  --   --   --  4.1  --  3.6   CR  --  0.53*  --  0.57*   < > 0.62    < > = values in this interval not displayed.        Diagnostics:  Recent Results (from the past 168 hour(s))   Pregnancy, Urine (HCG)    Collection Time: 10/06/21  9:42 AM   Result Value Ref Range    hCG Urine Qualitative Negative Negative      No EKG required, no history of coronary heart disease, significant arrhythmia, peripheral arterial disease or other structural heart disease.    Revised Cardiac Risk Index (RCRI):  The patient has the following serious cardiovascular risks for perioperative complications:   - No serious cardiac risks = 0 points     RCRI Interpretation: 0 points: Class I (very low risk - 0.4% complication rate)      Answers for HPI/ROS submitted by the patient on 10/6/2021  If you checked off  any problems, how difficult have these problems made it for you to do your work, take care of things at home, or get along with other people?: Somewhat difficult  PHQ9 TOTAL SCORE: 7  SUN 7 TOTAL SCORE: 3           Signed Electronically by: Ritika Contreras PA-C  Copy of this evaluation report is provided to requesting physician.

## 2021-10-06 NOTE — PATIENT INSTRUCTIONS
Patient should take the following medications the morning of surgery with a small sip of water: hold all meds.  Patient was instructed to hold the following medications the morning of surgery: hold all meds.     Patient was advised to call our office and the surgical services with any change in condition or new symptoms if they were to develop between today and their surgical date.  Especially any cardiopulmonary symptoms or symptoms concerning for an infection.     Discontinue aspirin, aleve, naproxen and ibuprofen 7 days prior to surgery to reduce bleeding risk.  It is fine to take tylenol the week before surgery.  Hold vitamins and herbal remedies for 7 days before surgery.    Please have a [employer] COVID-19 test on 10/17/2021.   Please stay in your car and call [phone] when you arrive.      Preparing for Your Surgery  Getting started  A nurse will call you to review your health history and instructions. They will give you an arrival time based on your scheduled surgery time.  Please be ready to share the following:    Your doctor's clinic name and phone number    Your medical, surgical and anesthesia history    A list of allergies and sensitivities    A list of medicines, including herbal treatments and over-the-counter drugs    Whether the patient has a legal guardian (ask how to send us the papers in advance)  If you have a child who's having surgery, please ask for a copy of Preparing for Your Child's Surgery.    Preparing for surgery    Within 30 days of surgery: Have a pre-op exam (sometimes called an H&P, or History and Physical). This can be done at a clinic or pre-operative center.  ? If you're having a , you may not need this exam. Talk to your care team    At your pre-op exam, talk to your care team about all medicines you take. If you need to stop any medicines before surgery, ask when to start taking them again.  ? We do this for your safety. Many medicines can make you bleed too much  during surgery. Some change how well surgery (anesthesia) drugs work.    Call your insurance company to let them know you're having surgery. (If you don't have insurance, call [phone].)    Call your clinic if there's any change in your health. This includes signs of a cold or flu (sore throat, runny nose, cough, rash, fever). It also includes a scrape or scratch near the surgery site.    If you have questions on the day of surgery, call your hospital or surgery center.  Eating and drinking guidelines  For your safety: Unless your surgeon tells you otherwise, follow the guidelines below.    Eat and drink as usual until 8 hours before surgery. After that, no food or milk.    Drink clear liquids until 2 hours before surgery. These are liquids you can see through, like water, Gatorade and Propel Water. You may also have black coffee and tea (no cream or milk).    Nothing by mouth within 2 hours of surgery. This includes gum, candy and breath mints.    If you drink, stop drinking alcohol the night before surgery.    If your care team tells you to take medicine on the morning of surgery, it's okay to take it with a sip of water.  Preventing infection    Shower or bathe the night before and morning of your surgery. Follow the instructions your clinic gave you. (If no instructions, use regular soap.)    Don't shave or clip hair near your surgery site. We'll remove the hair if needed.    Don't smoke or vape the morning of surgery. You may chew nicotine gum up to 2 hours before surgery. A nicotine patch is okay.  ? Note: Some surgeries require you to completely quit smoking and nicotine. Check with your surgeon.    Your care team will make every effort to keep you safe from infection. We will:  ? Clean our hands often with soap and water (or an alcohol-based hand rub).  ? Clean the skin at your surgery site with a special soap that kills germs.  ? Give you a special gown to keep you warm. (Cold raises the risk of  infection.)  ? Wear special hair covers, masks, gowns and gloves during surgery.  ? Give antibiotic medicine, if prescribed. Not all surgeries need antibiotics.  What to bring on the day of surgery    Photo ID and insurance card    Copy of your health care directive, if you have one    Glasses and hearing aides (bring cases)  ? You can't wear contacts during surgery    Inhaler and eye drops, if you use them (tell us about these when you arrive)    CPAP machine or breathing device, if you use them    A few personal items, if spending the night    If you have . . .  ? A pacemaker or ICD (cardiac defibrillator): Bring the ID card.  ? An implanted stimulator: Bring the remote control.  ? A legal guardian: Bring a copy of the certified (court-stamped) guardianship papers.  Please remove any jewelry, including body piercings. Leave jewelry and other valuables at home.  If you're going home the day of surgery  Important: If you don't follow the rules below, we must cancel your surgery.     Arrange for someone to drive you home after surgery. You may not drive, take a taxi or take public transportation by yourself (unless you'll have local anesthesia only).    Arrange for a responsible adult to stay with you overnight. If you don't, we may keep you in the hospital overnight, and you may need to pay the costs yourself.  Questions?   If you have any questions for your care team, list them here: _________________________________________________________________________________________________________________________________________________________________________________________________________________________________________________________________________________________________________________________  For informational purposes only. Not to replace the advice of your health care provider. Copyright   2003, 2019 [facility]. All rights reserved. Clinically reviewed by Marta Roche MD. [employer] [id number] - REV  4/20.

## 2021-10-07 ASSESSMENT — ANXIETY QUESTIONNAIRES: GAD7 TOTAL SCORE: 3

## 2021-10-07 ASSESSMENT — PATIENT HEALTH QUESTIONNAIRE - PHQ9: SUM OF ALL RESPONSES TO PHQ QUESTIONS 1-9: 7

## 2021-10-18 ENCOUNTER — ALLIED HEALTH/NURSE VISIT (OUTPATIENT)
Dept: FAMILY MEDICINE | Facility: OTHER | Age: 31
End: 2021-10-18
Attending: FAMILY MEDICINE
Payer: COMMERCIAL

## 2021-10-18 DIAGNOSIS — Z20.822 COVID-19 RULED OUT: Primary | ICD-10-CM

## 2021-10-18 PROCEDURE — C9803 HOPD COVID-19 SPEC COLLECT: HCPCS

## 2021-10-18 PROCEDURE — U0005 INFEC AGEN DETEC AMPLI PROBE: HCPCS | Mod: ZL

## 2021-10-19 LAB — SARS-COV-2 RNA RESP QL NAA+PROBE: NEGATIVE

## 2021-11-02 ENCOUNTER — OFFICE VISIT (OUTPATIENT)
Dept: OTOLARYNGOLOGY | Facility: OTHER | Age: 31
End: 2021-11-02
Attending: OTOLARYNGOLOGY
Payer: COMMERCIAL

## 2021-11-02 DIAGNOSIS — L70.0 ACNE VULGARIS: ICD-10-CM

## 2021-11-02 DIAGNOSIS — Z09 POSTOP CHECK: Primary | ICD-10-CM

## 2021-11-02 PROCEDURE — G0463 HOSPITAL OUTPT CLINIC VISIT: HCPCS

## 2021-11-02 NOTE — NURSING NOTE
Patient here to see ENT for post op excision submandibular gland  Arcelia Rosas LPN ..........11/2/2021 1:53 PM

## 2021-11-03 NOTE — TELEPHONE ENCOUNTER
"[facility] sent Rx request for the following:   clindamycin-benzoyl peroxide (BENZACLIN) 1-5 % external gel  SigApply topically 2 times daily    Last Prescription Date:   HISTORICAL  Last Fill Qty/Refills:         0, R-0    Last Office Visit:              10/06/2021 (Oja)   Future Office visit:           None noted   Topical Acne Medications Protocol Passed - 11/2/2021  1:20 PM        Passed - Patient is 12 years of age or older        Passed - Recent (12 mo) or future (30 days) visit within the authorizing provider's specialty     Patient has had an office visit with the authorizing provider or a provider within the authorizing providers department within the previous 12 mos or has a future within next 30 days. See \"Patient Info\" tab in inbasket, or \"Choose Columns\" in Meds & Orders section of the refill encounter.              Passed - Medication is active on med list           Patient has NO PCP listed. Routing to LOV provider. Unable to complete prescription refill per RN Medication Refill Policy.................... Lupe Rutledge RN ....................  11/3/2021   3:18 PM          "

## 2021-11-05 RX ORDER — CLINDAMYCIN AND BENZOYL PEROXIDE 10; 50 MG/G; MG/G
GEL TOPICAL
Qty: 50 G | Refills: 3 | Status: SHIPPED | OUTPATIENT
Start: 2021-11-05 | End: 2023-07-12

## 2021-11-05 NOTE — PROGRESS NOTES
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  Her submandibular gland was preserved.  The ranula was removed with a segment of sublingual salivary gland.  She has had no problems postoperatively.     Exam  Her incision is healing well.  There is no evidence of persisting cystic mass.  Her marginal nerve is intact.  Her lingual nerve and 12th nerve are intact.    Allergies    adhesive Adverse Reaction (Verified 10/21/21 07:41)  Blister    PFSH  PFSH:     Medical History (Updated 10/21/21 @ 08:51 by Sammy Marquez MD)    Cancer  Hearing loss  Kidney disease     Social History (Updated 10/07/20 @ 09:52 by Marilyn Andrew, Med Assist)  Smoking Status:  Current every day smoker  alcohol intake:  never       A&P  Assessment & Plan  (1) Postop check:        Status: Acute        Code(s):  Z09 - Encounter for follow-up examination after completed treatment for conditions other than malignant neoplasm  Additional Plan Details  Plan Details: Patient was reassured she appears to be healing well.  We discussed the possibility of recurrence.  She would like to investigate possible sclerosis rather than re-excision if it does recur.      Sammy Marquez MD    11/02/21 0906    <Electronically signed by Sammy Marquez MD> 11/02/21 4803

## 2021-11-14 ENCOUNTER — HOSPITAL ENCOUNTER (EMERGENCY)
Facility: OTHER | Age: 31
Discharge: HOME | End: 2021-11-15
Attending: FAMILY MEDICINE | Admitting: FAMILY MEDICINE
Payer: COMMERCIAL

## 2021-11-14 ENCOUNTER — APPOINTMENT (OUTPATIENT)
Dept: ULTRASOUND IMAGING | Facility: OTHER | Age: 31
End: 2021-11-14
Attending: FAMILY MEDICINE
Payer: COMMERCIAL

## 2021-11-14 ENCOUNTER — APPOINTMENT (OUTPATIENT)
Dept: CT IMAGING | Facility: OTHER | Age: 31
End: 2021-11-14
Attending: FAMILY MEDICINE
Payer: COMMERCIAL

## 2021-11-14 DIAGNOSIS — K11.6 RANULA OF SALIVARY GLAND OF FLOOR OF MOUTH: ICD-10-CM

## 2021-11-14 LAB
BASOPHILS # BLD AUTO: 0 10E3/UL (ref 0–0.2)
BASOPHILS NFR BLD AUTO: 0 %
CRP SERPL-MCNC: 9.9 MG/L
EOSINOPHIL # BLD AUTO: 0.2 10E3/UL (ref 0–0.7)
EOSINOPHIL NFR BLD AUTO: 3 %
ERYTHROCYTE [DISTWIDTH] IN BLOOD BY AUTOMATED COUNT: 12.6 % (ref 10–15)
HCT VFR BLD AUTO: 38.4 % (ref 35–47)
HGB BLD-MCNC: 13.2 G/DL (ref 11.7–15.7)
IMM GRANULOCYTES # BLD: 0 10E3/UL
IMM GRANULOCYTES NFR BLD: 0 %
LYMPHOCYTES # BLD AUTO: 1.1 10E3/UL (ref 0.8–5.3)
LYMPHOCYTES NFR BLD AUTO: 16 %
MCH RBC QN AUTO: 29.6 PG (ref 26.5–33)
MCHC RBC AUTO-ENTMCNC: 34.4 G/DL (ref 31.5–36.5)
MCV RBC AUTO: 86 FL (ref 78–100)
MONOCYTES # BLD AUTO: 0.4 10E3/UL (ref 0–1.3)
MONOCYTES NFR BLD AUTO: 6 %
NEUTROPHILS # BLD AUTO: 4.9 10E3/UL (ref 1.6–8.3)
NEUTROPHILS NFR BLD AUTO: 75 %
NRBC # BLD AUTO: 0 10E3/UL
NRBC BLD AUTO-RTO: 0 /100
PLATELET # BLD AUTO: 231 10E3/UL (ref 150–450)
RBC # BLD AUTO: 4.46 10E6/UL (ref 3.8–5.2)
SARS-COV-2 RNA RESP QL NAA+PROBE: NEGATIVE
WBC # BLD AUTO: 6.6 10E3/UL (ref 4–11)

## 2021-11-14 PROCEDURE — 36415 COLL VENOUS BLD VENIPUNCTURE: CPT | Performed by: FAMILY MEDICINE

## 2021-11-14 PROCEDURE — U0003 INFECTIOUS AGENT DETECTION BY NUCLEIC ACID (DNA OR RNA); SEVERE ACUTE RESPIRATORY SYNDROME CORONAVIRUS 2 (SARS-COV-2) (CORONAVIRUS DISEASE [COVID-19]), AMPLIFIED PROBE TECHNIQUE, MAKING USE OF HIGH THROUGHPUT TECHNOLOGIES AS DESCRIBED BY CMS-2020-01-R: HCPCS | Performed by: FAMILY MEDICINE

## 2021-11-14 PROCEDURE — 96366 THER/PROPH/DIAG IV INF ADDON: CPT | Performed by: FAMILY MEDICINE

## 2021-11-14 PROCEDURE — 250N000011 HC RX IP 250 OP 636: Performed by: FAMILY MEDICINE

## 2021-11-14 PROCEDURE — 96365 THER/PROPH/DIAG IV INF INIT: CPT | Mod: XU | Performed by: FAMILY MEDICINE

## 2021-11-14 PROCEDURE — 76536 US EXAM OF HEAD AND NECK: CPT | Mod: TC

## 2021-11-14 PROCEDURE — 250N000013 HC RX MED GY IP 250 OP 250 PS 637: Performed by: FAMILY MEDICINE

## 2021-11-14 PROCEDURE — 85025 COMPLETE CBC W/AUTO DIFF WBC: CPT | Performed by: FAMILY MEDICINE

## 2021-11-14 PROCEDURE — 70491 CT SOFT TISSUE NECK W/DYE: CPT | Mod: TC

## 2021-11-14 PROCEDURE — 96375 TX/PRO/DX INJ NEW DRUG ADDON: CPT | Performed by: FAMILY MEDICINE

## 2021-11-14 PROCEDURE — 99284 EMERGENCY DEPT VISIT MOD MDM: CPT | Performed by: FAMILY MEDICINE

## 2021-11-14 PROCEDURE — 99285 EMERGENCY DEPT VISIT HI MDM: CPT | Mod: 25 | Performed by: FAMILY MEDICINE

## 2021-11-14 PROCEDURE — C9803 HOPD COVID-19 SPEC COLLECT: HCPCS | Performed by: FAMILY MEDICINE

## 2021-11-14 PROCEDURE — 258N000003 HC RX IP 258 OP 636: Performed by: FAMILY MEDICINE

## 2021-11-14 PROCEDURE — 86140 C-REACTIVE PROTEIN: CPT | Performed by: FAMILY MEDICINE

## 2021-11-14 PROCEDURE — 96376 TX/PRO/DX INJ SAME DRUG ADON: CPT | Mod: XU | Performed by: FAMILY MEDICINE

## 2021-11-14 RX ORDER — IBUPROFEN 200 MG
600 TABLET ORAL EVERY 6 HOURS PRN
COMMUNITY
End: 2023-07-12

## 2021-11-14 RX ORDER — IOPAMIDOL 755 MG/ML
100 INJECTION, SOLUTION INTRAVASCULAR ONCE
Status: COMPLETED | OUTPATIENT
Start: 2021-11-14 | End: 2021-11-14

## 2021-11-14 RX ORDER — SODIUM CHLORIDE 9 MG/ML
INJECTION, SOLUTION INTRAVENOUS CONTINUOUS
Status: DISCONTINUED | OUTPATIENT
Start: 2021-11-14 | End: 2021-11-15 | Stop reason: HOSPADM

## 2021-11-14 RX ORDER — DEXAMETHASONE SODIUM PHOSPHATE 4 MG/ML
4 INJECTION, SOLUTION INTRA-ARTICULAR; INTRALESIONAL; INTRAMUSCULAR; INTRAVENOUS; SOFT TISSUE EVERY 6 HOURS
Status: DISCONTINUED | OUTPATIENT
Start: 2021-11-14 | End: 2021-11-15 | Stop reason: HOSPADM

## 2021-11-14 RX ORDER — DEXAMETHASONE SODIUM PHOSPHATE 10 MG/ML
6 INJECTION, SOLUTION INTRAMUSCULAR; INTRAVENOUS ONCE
Status: COMPLETED | OUTPATIENT
Start: 2021-11-14 | End: 2021-11-14

## 2021-11-14 RX ADMIN — TAZOBACTAM SODIUM AND PIPERACILLIN SODIUM 4.5 G: 500; 4 INJECTION, SOLUTION INTRAVENOUS at 12:08

## 2021-11-14 RX ADMIN — SODIUM CHLORIDE: 9 INJECTION, SOLUTION INTRAVENOUS at 14:13

## 2021-11-14 RX ADMIN — TAZOBACTAM SODIUM AND PIPERACILLIN SODIUM 4.5 G: 500; 4 INJECTION, SOLUTION INTRAVENOUS at 19:36

## 2021-11-14 RX ADMIN — IOPAMIDOL 100 ML: 755 INJECTION, SOLUTION INTRAVENOUS at 12:56

## 2021-11-14 RX ADMIN — ACETAMINOPHEN ORAL SOLUTION 500 MG: 650 SOLUTION ORAL at 16:01

## 2021-11-14 RX ADMIN — SODIUM CHLORIDE 1000 ML: 9 INJECTION, SOLUTION INTRAVENOUS at 12:04

## 2021-11-14 RX ADMIN — DEXAMETHASONE SODIUM PHOSPHATE 4 MG: 4 INJECTION, SOLUTION INTRAMUSCULAR; INTRAVENOUS at 19:35

## 2021-11-14 RX ADMIN — DEXAMETHASONE SODIUM PHOSPHATE 6 MG: 10 INJECTION INTRAMUSCULAR; INTRAVENOUS at 12:05

## 2021-11-14 RX ADMIN — SODIUM CHLORIDE: 9 INJECTION, SOLUTION INTRAVENOUS at 23:50

## 2021-11-14 RX ADMIN — ACETAMINOPHEN ORAL SOLUTION 500 MG: 650 SOLUTION ORAL at 09:53

## 2021-11-14 ASSESSMENT — ENCOUNTER SYMPTOMS
FEVER: 0
CHILLS: 0
COUGH: 0

## 2021-11-14 ASSESSMENT — MIFFLIN-ST. JEOR: SCORE: 1188.96

## 2021-11-14 NOTE — PHARMACY-ADMISSION MEDICATION HISTORY
Pharmacy -- Admission Medication Reconciliation    Prior to admission (PTA) medications were reviewed and the patient's PTA medication list was updated.    Sources Consulted: patient interview, sure scripts, chart review    The reliability of this Medication Reconciliation is: Reliability: Reliable    The following significant changes were made:    Updated discharge pharmacy    Added ibuprofen--rare use     In addition, the patient's allergies were reviewed with the patient and updated as follows:   Allergies: Adhesive tape    The pharmacist has reviewed with the patient that all personal medications should be removed from the building or locked in the belongings safe.  Patient shall only take medications ordered by the physician and administered by the nursing staff.       Medication barriers identified: none   Medication adherence concerns: none   Understanding of emergency medications: KRIS Emanuel RPH, 11/14/2021,  5:29 PM

## 2021-11-14 NOTE — ED TRIAGE NOTES
ED Nursing Triage Note (General)   ________________________________    Noemí Garcia is a 31 year old Female that presents to triage private car  With history of  Pt states that she had surgery on her mandibular for a cyst on 10/21/21 and the last few days the right side started swelling and getting bigger, pt states she can swallow but difficult and her voice seems hoarse.  Pt states her ain 3-4/10 stretches around to the back of her neck  reported by patient   Significant symptoms had onset 3 day(s) ago.    Patient appears alert , in no acute distress., and cooperative behavior.    GCS Eye Opening = 4=Spontaneous  Airway: intact  Breathing noted as Normal  Circulation Normal  Skin:  Normal  Action taken:  Triage to critical care immediately      PRE HOSPITAL PRIOR LIVING SITUATION Spouse and Children

## 2021-11-14 NOTE — ED PROVIDER NOTES
History     Chief Complaint   Patient presents with     Post-op Problem     HPI  Noemí Garcia is a 31 year old female who presents with recurrent neck mass to the emergency department.  Patient had a ranula excision on 10/21/2021 by Dr. Marquez.  She had a normal postoperative assessment on November 2 where they discussed possibility of recurrence in future sclerotherapy potentially.  At that time it was not recurred however now in the last 3 days symptoms have fairly rapidly progressed.  No fevers or chills.  She states this feels worse than when she had the initial ranula.  She is able to tolerate her secretions and tolerate oral fluids.  Patient is currently breast-feeding.    Reviewed nurses notes below,.  Noemí Garcia is a 31 year old Female that presents to triage private car  With history of  Pt states that she had surgery on her mandibular for a cyst on 10/21/21 and the last few days the right side started swelling and getting bigger, pt states she can swallow but difficult and her voice seems hoarse.  Pt states her ain 3-4/10 stretches around to the back of her neck  reported by patient   Significant symptoms had onset 3 day(s) ago.  Allergies:  Allergies   Allergen Reactions     Adhesive Tape Other (See Comments)     Blistered skin       Problem List:    Patient Active Problem List    Diagnosis Date Noted     Postpartum endometritis 07/30/2021     Priority: Medium     History of lymphoma 07/28/2021     Priority: Medium     ASCUS with positive high risk HPV cervical 07/28/2021     Priority: Medium     Hodgkin's disease of lymph nodes of multiple sites (H) 11/12/2019     Priority: Medium     Bipolar II disorder, moderate, depressed, with anxious distress (H) 10/05/2018     Priority: Medium     Deviated nasal septum 03/10/2016     Priority: Medium     Generalized anxiety disorder 03/02/2016     Priority: Medium     Kidney stones 06/16/2015     Priority: Medium     Renal colic 05/23/2015     Priority:  "Medium     Anal fissure 07/10/2013     Priority: Medium        Past Medical History:    Past Medical History:   Diagnosis Date     Abnormal Pap smear of cervix      Cancer (H)      Cerebral infarction (H)      Depressive disorder        Past Surgical History:    Past Surgical History:   Procedure Laterality Date     AS REMOVE GROIN LYMPH NODES       BONE MARROW BIOPSY       COLONOSCOPY       COLPOSCOPY       EXTRACORPOREAL SHOCK WAVE LITHOTRIPSY, CYSTOSCOPY, INSERT STENT URETER(S), COMBINED       GALLBLADDER SURGERY       HEAD & NECK SURGERY      cyst removal     KIDNEY STONE SURGERY       RHINOPLASTY         Family History:    Family History   Problem Relation Age of Onset     No Known Problems Mother      Heart Disease Father         MIs     No Known Problems Sister      No Known Problems Sister      No Known Problems Brother      No Known Problems Brother      No Known Problems Brother      Alzheimer Disease Maternal Grandmother      Heart Disease Maternal Grandfather         MI or stroke?     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   HENT: Negative for drooling.    Respiratory: Negative for cough.        Physical Exam   BP: 100/76  Pulse: 75  Temp: 98.4  F (36.9  C)  Resp: 15  Height: 157.5 cm (5' 2\")  Weight: 53.1 " kg (117 lb)  SpO2: 99 %      Physical Exam  Vitals and nursing note reviewed.   HENT:      Head:      Jaw: Trismus present.      Right Ear: Tympanic membrane normal.      Left Ear: Tympanic membrane normal.      Nose: No congestion.      Mouth/Throat:      Lips: Pink.      Mouth: Mucous membranes are moist.      Pharynx: No pharyngeal swelling, oropharyngeal exudate, posterior oropharyngeal erythema or uvula swelling.   Neck:      Thyroid: Thyroid mass present. No thyromegaly or thyroid tenderness.      Trachea: No tracheal tenderness.   Musculoskeletal:      Cervical back: Normal range of motion. Erythema and tenderness present. No crepitus.   Lymphadenopathy:      Cervical: No cervical adenopathy.   Neurological:      Mental Status: She is alert.         ED Course        Procedures  Results for orders placed or performed during the hospital encounter of 11/14/21 (from the past 24 hour(s))   CBC with platelets differential    Narrative    The following orders were created for panel order CBC with platelets differential.  Procedure                               Abnormality         Status                     ---------                               -----------         ------                     CBC with platelets and d...[[id number]]                      Final result                 Please view results for these tests on the individual orders.   CRP inflammation   Result Value Ref Range    CRP Inflammation 9.9 <10.0 mg/L   CBC with platelets and differential   Result Value Ref Range    WBC Count 6.6 4.0 - 11.0 10e3/uL    RBC Count 4.46 3.80 - 5.20 10e6/uL    Hemoglobin 13.2 11.7 - 15.7 g/dL    Hematocrit 38.4 35.0 - 47.0 %    MCV 86 78 - 100 fL    MCH 29.6 26.5 - 33.0 pg    MCHC 34.4 31.5 - 36.5 g/dL    RDW 12.6 10.0 - 15.0 %    Platelet Count 231 150 - 450 10e3/uL    % Neutrophils 75 %    % Lymphocytes 16 %    % Monocytes 6 %    % Eosinophils 3 %    % Basophils 0 %    % Immature Granulocytes 0 %    NRBCs per 100 WBC 0  <1 /100    Absolute Neutrophils 4.9 1.6 - 8.3 10e3/uL    Absolute Lymphocytes 1.1 0.8 - 5.3 10e3/uL    Absolute Monocytes 0.4 0.0 - 1.3 10e3/uL    Absolute Eosinophils 0.2 0.0 - 0.7 10e3/uL    Absolute Basophils 0.0 0.0 - 0.2 10e3/uL    Absolute Immature Granulocytes 0.0 <=0.0 10e3/uL    Absolute NRBCs 0.0 10e3/uL   US Head Neck Soft Tissue    Narrative    PROCEDURE INFORMATION:   Exam: US Soft Tissue Head and Neck, Soft Tissue   Exam date and time: 11/14/2021 10:26 AM   Age: 31 years old   Clinical indication: Mass, lump, or swelling in neck; Right; Prior surgery;   Surgery date: 1-6 months; Additional info: H/o ranula excision now with   recurrence     TECHNIQUE:   Imaging protocol: Real-time ultrasound scan of the head and neck with image   documentation. Exam focused on the soft tissue in the region of clinical   concern.     COMPARISON:   No relevant prior studies available.     FINDINGS:   Lymph nodes: No lymphadenopathy.   Soft tissues:  Palpable abnormality is demonstrated to complex mass measuring   3.2 x 1.4 by 3.6 cm. . Differential includes recurrent cyst, abscess, hematoma,   seroma..       Impression    IMPRESSION:   Palpable abnormality is demonstrated to complex mass measuring 3.2 x 1.4 by 3.6   cm. . Differential includes recurrent cyst, abscess, hematoma, seroma..     THIS DOCUMENT HAS BEEN ELECTRONICALLY SIGNED BY JEANETTE TODD MD   Asymptomatic COVID-19 Virus (Coronavirus) by PCR Nose    Specimen: Nose; Swab   Result Value Ref Range    SARS CoV2 PCR Negative Negative    Narrative    Testing was performed using the Xpert Xpress SARS-CoV-2 Assay on the   Cepheid Gene-Xpert Instrument Systems. Additional information about   this Emergency Use Authorization (EUA) assay can be found via the Lab   Guide. This test should be ordered for the detection of SARS-CoV-2 in   individuals who meet SARS-CoV-2 clinical and/or epidemiological   criteria. Test performance is unknown in asymptomatic patients. This    test is for in vitro diagnostic use under the FDA EUA for   laboratories certified under CLIA to perform high complexity testing.   This test has not been FDA cleared or approved. A negative result   does not rule out the presence of PCR inhibitors in the specimen or   target RNA in concentration below the limit of detection for the   assay. The possibility of a false negative should be considered if   the patient's recent exposure or clinical presentation suggests   COVID-19. This test was validated by [facility]. This laboratory is certified under the Clinic  al Laboratory Improvement Amendments (CLIA) as qualified to perform high complex  ity clinical laboratory testing.   CT Soft Tissue Neck w Contrast    Narrative    PROCEDURE INFORMATION:   Exam: CT Neck With Contrast   Exam date and time: 11/14/2021 12:41 PM   Age: 31 years old   Clinical indication: Mass, lump, or swelling in neck; Right; Additional info:   Suspect recurrent ranula, patient is breast-feeding     TECHNIQUE:   Imaging protocol: Computed tomography images of the neck with contrast.   Radiation optimization: All CT scans at this facility use at least one of these   dose optimization techniques: automated exposure control; mA and/or kV   adjustment per patient size (includes targeted exams where dose is matched to   clinical indication); or iterative reconstruction.   Contrast material: [id number]; Contrast volume: 100 ml; Contrast route:   INTRAVENOUS (IV);      COMPARISON:   CT SOFT TISSUE NECK W CONTRAST 8/11/2021 1:23 PM     FINDINGS:   Mastoid air cells: No evidence for mastoid effusion.   Paranasal sinuses: New small layering fluid in the right maxillary sinus.   Chronic medial left maxillary sinus mucous retention cyst.   Nasopharynx: Unremarkable.   Oropharynx: Unremarkable. No significant tonsillar enlargement.   Hypopharynx: Unremarkable.   Larynx: Unremarkable. Normal epiglottis.  "  Retropharyngeal space: Unremarkable.   Submandibular/Parotid glands: Interval increase in size versus recurrent   presumed diving ranula anterior to the right submandibular gland from 2.1 x 2.1   x 2.1 cm to 2.1 x 2.4 x 3.1 cm with the largest craniocaudal increase seen on   the sagittal series 6, image 43. Overall, there has been an increase in the   peripheral enhancement with extension into the skin best seen on series 6,   image 43.  Probable surgical scar.  Thyroid: Normal. No enlarged or calcified nodules.    Lymph nodes: Mild increase in the associated reactive upper cervical   lymphadenopathy.     Trachea: Visualized trachea is unremarkable.   Lungs: Unremarkable as visualized.   Bones/joints: Unremarkable. No acute fracture.   Soft tissues: See \"Submandibular/Parotid glands\" finding.       Impression    IMPRESSION:   1. Interval increase in size and peripheral enhancement of the complex cystic   presumed recurrence of a diving ranula anterior to the right submandibular   gland representing new infected component. A tiny subcutaneous component of   enhancement is identified on the sagittal images extending to the skin, likely   postsurgical scar.  Correlate with operative report.  2. Mild increase in the adjacent reactive lymphadenopathy in the upper neck.   3. New small air-fluid level in the right maxillary sinus.     THIS DOCUMENT HAS BEEN ELECTRONICALLY SIGNED BY NEETA ZAVALA MD       Medications   acetaminophen (TYLENOL) solution 500 mg (500 mg Oral Given 11/14/21 0912)   0.9% sodium chloride BOLUS (0 mLs Intravenous Stopped 11/14/21 1413)     Followed by   sodium chloride 0.9% infusion ( Intravenous New Bag 11/14/21 1413)   dexamethasone PF (DECADRON) injection 6 mg (6 mg Intravenous Given 11/14/21 1205)   piperacillin-tazobactam (ZOSYN) intermittent infusion 4.5 g (4.5 g Intravenous New Bag 11/14/21 1208)   iopamidol (ISOVUE-370) solution 100 mL (100 mLs Intravenous Given 11/14/21 1256) "       Assessments & Plan (with Medical Decision Making)     I have reviewed the nursing notes.    I have reviewed the findings, diagnosis, plan and need for follow up with the patient.  Patient is tolerating oral fluids and can tolerate her secretions.  She does have 2 finger trismus however.  11:34 AM--currently on hold with ENT  11:37 AM--Spoke with Dr. Garcia from [facility].  He is recommending IV antibiotics, steroids and repeat CT with contrast.  He will look at those CT images later today and decide on a treatment plan.  2:13 PM--images have been pushed to [facility].  Currently on hold for to speak with ENT again.  Stat doc told me however [facility] is on total divert including the emergency department.    2:21 PM--did speak with ENT again.  He really would like patient transferred but [facility] and [facility] are on complete divert.  He is recommending holding the patient for now treating her with steroids and antibiotics and getting her transferred to ENT care as soon as possible.  This is an urgent matter but not a hyper urgent matter, if it is tomorrow before we can transfer to Dr. Marquez that would be appropriate.  Patient is currently stable and doing better slightly after the Decadron.  We will need to transition patient to boarder status.    New Prescriptions    No medications on file     6:50 PM--just checked on the patient she is tolerating some food. I ordered her Zosyn and Decadron for overnight. Discussed with hospitalist and we will continue to keep the patient on border status as she does need higher level of care. We will need to call [facility] and/or [facility] again later to see if they can take the patient likely tomorrow  At the earliest. Did speak with [facility] ENT as described above who is on-call for [facility] and [facility] today but Mrs. Garcia is a patient of Dr. Velásquez and according to ENT Dr. Marquez should be available tomorrow.  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  She is basically boarding here tonight as we could not find a bed for her.  She is doing well and required no further intervention during night shift.  Turned over to dayshift at shift change.     David Mancini MD  11/29/21 5120

## 2021-11-15 VITALS
DIASTOLIC BLOOD PRESSURE: 61 MMHG | SYSTOLIC BLOOD PRESSURE: 98 MMHG | TEMPERATURE: 96.2 F | BODY MASS INDEX: 21.53 KG/M2 | RESPIRATION RATE: 16 BRPM | OXYGEN SATURATION: 98 % | WEIGHT: 117 LBS | HEART RATE: 56 BPM | HEIGHT: 62 IN

## 2021-11-15 PROCEDURE — 250N000013 HC RX MED GY IP 250 OP 250 PS 637: Performed by: FAMILY MEDICINE

## 2021-11-15 PROCEDURE — 250N000011 HC RX IP 250 OP 636: Performed by: FAMILY MEDICINE

## 2021-11-15 RX ORDER — DEXAMETHASONE 4 MG/1
4 TABLET ORAL 2 TIMES DAILY WITH MEALS
Qty: 6 TABLET | Refills: 0 | Status: ON HOLD | OUTPATIENT
Start: 2021-11-15 | End: 2021-11-21

## 2021-11-15 RX ADMIN — DEXAMETHASONE SODIUM PHOSPHATE 4 MG: 4 INJECTION, SOLUTION INTRAMUSCULAR; INTRAVENOUS at 06:33

## 2021-11-15 RX ADMIN — TAZOBACTAM SODIUM AND PIPERACILLIN SODIUM 4.5 G: 500; 4 INJECTION, SOLUTION INTRAVENOUS at 06:33

## 2021-11-15 RX ADMIN — DEXAMETHASONE SODIUM PHOSPHATE 4 MG: 4 INJECTION, SOLUTION INTRAMUSCULAR; INTRAVENOUS at 01:10

## 2021-11-15 RX ADMIN — ACETAMINOPHEN ORAL SOLUTION 500 MG: 650 SOLUTION ORAL at 03:10

## 2021-11-15 RX ADMIN — TAZOBACTAM SODIUM AND PIPERACILLIN SODIUM 4.5 G: 500; 4 INJECTION, SOLUTION INTRAVENOUS at 01:10

## 2021-11-15 NOTE — ED PROVIDER NOTES
[facility]  Emergency Department Sign Out Note      Transfer of care from Dr. Mancini. See separate Emergency Department note.    Assessment and Plan:  The patient was evaluated by the previous provider for recurrence of a diving ranula anterior to the right submandibular   gland representing new infected componentrecurrence of a diving ranula anterior to the right submandibular   gland representing new infected component    I spoke with Dr Marquez. The patient is afebrile and tolerating liquids. His recommendation is to discharge on oral antibiotics and follow up in clinic tomorrow at 9am. I spoke with the patient and she is comfortable with discharge at this time as she is feeling better. She just received her third dose of zosyn. Will discharge on Augmentin and decadron. Her pain has been controlled with tylenol    Diagnosis  1. 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

## 2021-11-16 ENCOUNTER — OFFICE VISIT (OUTPATIENT)
Dept: OTOLARYNGOLOGY | Facility: OTHER | Age: 31
End: 2021-11-16
Attending: OTOLARYNGOLOGY
Payer: COMMERCIAL

## 2021-11-16 DIAGNOSIS — K11.6 MUCOCELE OF SALIVARY GLAND: ICD-10-CM

## 2021-11-16 DIAGNOSIS — Z09 POSTOP CHECK: Primary | ICD-10-CM

## 2021-11-16 PROCEDURE — G0463 HOSPITAL OUTPT CLINIC VISIT: HCPCS

## 2021-11-16 NOTE — NURSING NOTE
Patient here to see ENT for a follow up submandibular gland after surgery.   Arcelia Rosas LPN ..........11/16/2021 9:12 AM

## 2021-11-18 NOTE — PROGRESS NOTES
document embedded image  Patient Name: Noemí Garcia    Address: [address] [place] [address]    YOB: 1990    [place] [address]    MR Number: [id number]    Phone: [phone]  PCP: UNKNOWN            Appointment Date: 11/16/21   Visit Provider: Sammy Marquez MD    cc: UNKNOWN; ~    ENT Progress Note  Visit Reasons: Follow up Submandibular gland after surgery    HPI  History of Present Illness  Chief complaint:  Postop check    History  The patient is a 31-year-old female who approximately 3 weeks ago underwent re-excision of a submandibular triangle ranula.  She has no neurologic deficit postoperatively.  She developed swelling over the weekend.  A CT scan was obtained that showed a fluid collection with substantial surrounding inflammation.  She says she has dramatically reduced with swelling after being placed on steroids and antibiotics.  She has had no airway compromise or swallowing difficulties.    Exam  She does have moderate swelling in the submandibular triangle.  There is really no overlying erythema at this time.  It is quite tender.  There is no floor of mouth swelling or airway compromise.  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  There is certainly seems to be inflammatory component given the significant edema on the CT.  I have asked that she finish her antibiotics and steroid and see me 2 weeks for reinspection.      Sammy Marquez MD    11/16/21 0758    <Electronically signed by Sammy Marquez MD> 11/16/21 6662

## 2021-11-20 ENCOUNTER — HOSPITAL ENCOUNTER (INPATIENT)
Facility: OTHER | Age: 31
LOS: 2 days | Discharge: HOME | End: 2021-11-22
Attending: FAMILY MEDICINE | Admitting: FAMILY MEDICINE
Payer: COMMERCIAL

## 2021-11-20 ENCOUNTER — HOSPITAL ENCOUNTER (EMERGENCY)
Facility: OTHER | Age: 31
Discharge: ANOTHER HOSPITAL | End: 2021-11-20
Attending: STUDENT IN AN ORGANIZED HEALTH CARE EDUCATION/TRAINING PROGRAM | Admitting: STUDENT IN AN ORGANIZED HEALTH CARE EDUCATION/TRAINING PROGRAM
Payer: COMMERCIAL

## 2021-11-20 ENCOUNTER — APPOINTMENT (OUTPATIENT)
Dept: CT IMAGING | Facility: OTHER | Age: 31
End: 2021-11-20
Attending: STUDENT IN AN ORGANIZED HEALTH CARE EDUCATION/TRAINING PROGRAM
Payer: COMMERCIAL

## 2021-11-20 VITALS
OXYGEN SATURATION: 99 % | HEART RATE: 64 BPM | RESPIRATION RATE: 18 BRPM | TEMPERATURE: 98.4 F | HEIGHT: 62 IN | WEIGHT: 117 LBS | SYSTOLIC BLOOD PRESSURE: 114 MMHG | BODY MASS INDEX: 21.53 KG/M2 | DIASTOLIC BLOOD PRESSURE: 75 MMHG

## 2021-11-20 DIAGNOSIS — K12.2 SUBMANDIBULAR ABSCESS: ICD-10-CM

## 2021-11-20 LAB
ANION GAP SERPL CALCULATED.3IONS-SCNC: 11 MMOL/L (ref 3–14)
BASOPHILS # BLD AUTO: 0 10E3/UL (ref 0–0.2)
BASOPHILS NFR BLD AUTO: 0 %
BUN SERPL-MCNC: 13 MG/DL (ref 7–25)
CALCIUM SERPL-MCNC: 9.6 MG/DL (ref 8.6–10.3)
CHLORIDE BLD-SCNC: 104 MMOL/L (ref 98–107)
CO2 SERPL-SCNC: 21 MMOL/L (ref 21–31)
CREAT SERPL-MCNC: 0.71 MG/DL (ref 0.6–1.2)
CRP SERPL HS-MCNC: 19.3 MG/L
EOSINOPHIL # BLD AUTO: 0.3 10E3/UL (ref 0–0.7)
EOSINOPHIL NFR BLD AUTO: 4 %
ERYTHROCYTE [DISTWIDTH] IN BLOOD BY AUTOMATED COUNT: 12.6 % (ref 10–15)
GFR SERPL CREATININE-BSD FRML MDRD: >90 ML/MIN/1.73M2
GLUCOSE BLD-MCNC: 84 MG/DL (ref 70–105)
HCT VFR BLD AUTO: 38.9 % (ref 35–47)
HGB BLD-MCNC: 13.3 G/DL (ref 11.7–15.7)
IMM GRANULOCYTES # BLD: 0 10E3/UL
IMM GRANULOCYTES NFR BLD: 1 %
LYMPHOCYTES # BLD AUTO: 1.5 10E3/UL (ref 0.8–5.3)
LYMPHOCYTES NFR BLD AUTO: 17 %
MCH RBC QN AUTO: 29.1 PG (ref 26.5–33)
MCHC RBC AUTO-ENTMCNC: 34.2 G/DL (ref 31.5–36.5)
MCV RBC AUTO: 85 FL (ref 78–100)
MONOCYTES # BLD AUTO: 0.6 10E3/UL (ref 0–1.3)
MONOCYTES NFR BLD AUTO: 7 %
NEUTROPHILS # BLD AUTO: 6.2 10E3/UL (ref 1.6–8.3)
NEUTROPHILS NFR BLD AUTO: 71 %
NRBC # BLD AUTO: 0 10E3/UL
NRBC BLD AUTO-RTO: 0 /100
PLATELET # BLD AUTO: 282 10E3/UL (ref 150–450)
POTASSIUM BLD-SCNC: 3.9 MMOL/L (ref 3.5–5.1)
RBC # BLD AUTO: 4.57 10E6/UL (ref 3.8–5.2)
SARS-COV-2 RNA RESP QL NAA+PROBE: NEGATIVE
SODIUM SERPL-SCNC: 136 MMOL/L (ref 134–144)
WBC # BLD AUTO: 8.6 10E3/UL (ref 4–11)

## 2021-11-20 PROCEDURE — U0005 INFEC AGEN DETEC AMPLI PROBE: HCPCS | Performed by: STUDENT IN AN ORGANIZED HEALTH CARE EDUCATION/TRAINING PROGRAM

## 2021-11-20 PROCEDURE — 76536 US EXAM OF HEAD AND NECK: CPT | Mod: 26 | Performed by: STUDENT IN AN ORGANIZED HEALTH CARE EDUCATION/TRAINING PROGRAM

## 2021-11-20 PROCEDURE — 250N000011 HC RX IP 250 OP 636: Performed by: STUDENT IN AN ORGANIZED HEALTH CARE EDUCATION/TRAINING PROGRAM

## 2021-11-20 PROCEDURE — 86141 C-REACTIVE PROTEIN HS: CPT | Performed by: STUDENT IN AN ORGANIZED HEALTH CARE EDUCATION/TRAINING PROGRAM

## 2021-11-20 PROCEDURE — 70491 CT SOFT TISSUE NECK W/DYE: CPT | Mod: TC

## 2021-11-20 PROCEDURE — 85025 COMPLETE CBC W/AUTO DIFF WBC: CPT | Performed by: STUDENT IN AN ORGANIZED HEALTH CARE EDUCATION/TRAINING PROGRAM

## 2021-11-20 PROCEDURE — 99285 EMERGENCY DEPT VISIT HI MDM: CPT | Mod: 25 | Performed by: STUDENT IN AN ORGANIZED HEALTH CARE EDUCATION/TRAINING PROGRAM

## 2021-11-20 PROCEDURE — 120N000001 HC R&B MED SURG/OB

## 2021-11-20 PROCEDURE — 80048 BASIC METABOLIC PNL TOTAL CA: CPT | Performed by: STUDENT IN AN ORGANIZED HEALTH CARE EDUCATION/TRAINING PROGRAM

## 2021-11-20 PROCEDURE — 36415 COLL VENOUS BLD VENIPUNCTURE: CPT | Performed by: STUDENT IN AN ORGANIZED HEALTH CARE EDUCATION/TRAINING PROGRAM

## 2021-11-20 PROCEDURE — 250N000011 HC RX IP 250 OP 636: Performed by: FAMILY MEDICINE

## 2021-11-20 PROCEDURE — 96376 TX/PRO/DX INJ SAME DRUG ADON: CPT | Performed by: STUDENT IN AN ORGANIZED HEALTH CARE EDUCATION/TRAINING PROGRAM

## 2021-11-20 PROCEDURE — C9803 HOPD COVID-19 SPEC COLLECT: HCPCS | Performed by: STUDENT IN AN ORGANIZED HEALTH CARE EDUCATION/TRAINING PROGRAM

## 2021-11-20 PROCEDURE — 96375 TX/PRO/DX INJ NEW DRUG ADDON: CPT | Mod: XU | Performed by: STUDENT IN AN ORGANIZED HEALTH CARE EDUCATION/TRAINING PROGRAM

## 2021-11-20 PROCEDURE — 76536 US EXAM OF HEAD AND NECK: CPT | Mod: TC | Performed by: STUDENT IN AN ORGANIZED HEALTH CARE EDUCATION/TRAINING PROGRAM

## 2021-11-20 PROCEDURE — 99222 1ST HOSP IP/OBS MODERATE 55: CPT | Mod: AI | Performed by: FAMILY MEDICINE

## 2021-11-20 PROCEDURE — 96365 THER/PROPH/DIAG IV INF INIT: CPT | Performed by: STUDENT IN AN ORGANIZED HEALTH CARE EDUCATION/TRAINING PROGRAM

## 2021-11-20 PROCEDURE — 999N000104 HC STATISTIC NO CHARGE: Performed by: FAMILY MEDICINE

## 2021-11-20 RX ORDER — KETOROLAC TROMETHAMINE 30 MG/ML
30 INJECTION, SOLUTION INTRAMUSCULAR; INTRAVENOUS EVERY 6 HOURS PRN
Status: DISCONTINUED | OUTPATIENT
Start: 2021-11-20 | End: 2021-11-21

## 2021-11-20 RX ORDER — HYDROMORPHONE HYDROCHLORIDE 1 MG/ML
0.5 INJECTION, SOLUTION INTRAMUSCULAR; INTRAVENOUS; SUBCUTANEOUS
Status: DISCONTINUED | OUTPATIENT
Start: 2021-11-20 | End: 2021-11-22 | Stop reason: HOSPADM

## 2021-11-20 RX ORDER — NALOXONE HYDROCHLORIDE 0.4 MG/ML
0.4 INJECTION, SOLUTION INTRAMUSCULAR; INTRAVENOUS; SUBCUTANEOUS
Status: DISCONTINUED | OUTPATIENT
Start: 2021-11-20 | End: 2021-11-22 | Stop reason: HOSPADM

## 2021-11-20 RX ORDER — ONDANSETRON 2 MG/ML
4 INJECTION INTRAMUSCULAR; INTRAVENOUS EVERY 6 HOURS PRN
Status: DISCONTINUED | OUTPATIENT
Start: 2021-11-20 | End: 2021-11-22 | Stop reason: HOSPADM

## 2021-11-20 RX ORDER — ONDANSETRON 4 MG/1
4 TABLET, ORALLY DISINTEGRATING ORAL EVERY 6 HOURS PRN
Status: DISCONTINUED | OUTPATIENT
Start: 2021-11-20 | End: 2021-11-22 | Stop reason: HOSPADM

## 2021-11-20 RX ORDER — AMPICILLIN AND SULBACTAM 2; 1 G/1; G/1
3 INJECTION, POWDER, FOR SOLUTION INTRAMUSCULAR; INTRAVENOUS ONCE
Status: COMPLETED | OUTPATIENT
Start: 2021-11-20 | End: 2021-11-20

## 2021-11-20 RX ORDER — NALOXONE HYDROCHLORIDE 0.4 MG/ML
0.2 INJECTION, SOLUTION INTRAMUSCULAR; INTRAVENOUS; SUBCUTANEOUS
Status: DISCONTINUED | OUTPATIENT
Start: 2021-11-20 | End: 2021-11-22 | Stop reason: HOSPADM

## 2021-11-20 RX ORDER — HYDROMORPHONE HYDROCHLORIDE 1 MG/ML
0.5 INJECTION, SOLUTION INTRAMUSCULAR; INTRAVENOUS; SUBCUTANEOUS ONCE
Status: COMPLETED | OUTPATIENT
Start: 2021-11-20 | End: 2021-11-20

## 2021-11-20 RX ORDER — HYDROCODONE BITARTRATE AND ACETAMINOPHEN 5; 325 MG/1; MG/1
TABLET ORAL
Status: ON HOLD | COMMUNITY
Start: 2021-10-21 | End: 2021-11-21

## 2021-11-20 RX ORDER — KETOROLAC TROMETHAMINE 30 MG/ML
15 INJECTION, SOLUTION INTRAMUSCULAR; INTRAVENOUS ONCE
Status: COMPLETED | OUTPATIENT
Start: 2021-11-20 | End: 2021-11-20

## 2021-11-20 RX ORDER — IOPAMIDOL 755 MG/ML
100 INJECTION, SOLUTION INTRAVASCULAR ONCE
Status: COMPLETED | OUTPATIENT
Start: 2021-11-20 | End: 2021-11-20

## 2021-11-20 RX ORDER — DEXAMETHASONE SODIUM PHOSPHATE 4 MG/ML
10 INJECTION, SOLUTION INTRA-ARTICULAR; INTRALESIONAL; INTRAMUSCULAR; INTRAVENOUS; SOFT TISSUE EVERY 6 HOURS
Status: DISCONTINUED | OUTPATIENT
Start: 2021-11-20 | End: 2021-11-21

## 2021-11-20 RX ORDER — LIDOCAINE 40 MG/G
CREAM TOPICAL
Status: DISCONTINUED | OUTPATIENT
Start: 2021-11-20 | End: 2021-11-22 | Stop reason: HOSPADM

## 2021-11-20 RX ORDER — ACETAMINOPHEN 500 MG
1000 TABLET ORAL EVERY 6 HOURS PRN
Status: DISCONTINUED | OUTPATIENT
Start: 2021-11-20 | End: 2021-11-22 | Stop reason: HOSPADM

## 2021-11-20 RX ORDER — AMPICILLIN AND SULBACTAM 2; 1 G/1; G/1
3 INJECTION, POWDER, FOR SOLUTION INTRAMUSCULAR; INTRAVENOUS EVERY 6 HOURS
Status: DISCONTINUED | OUTPATIENT
Start: 2021-11-20 | End: 2021-11-22 | Stop reason: HOSPADM

## 2021-11-20 RX ADMIN — KETOROLAC TROMETHAMINE 15 MG: 30 INJECTION, SOLUTION INTRAMUSCULAR; INTRAVENOUS at 09:24

## 2021-11-20 RX ADMIN — IOPAMIDOL 100 ML: 755 INJECTION, SOLUTION INTRAVENOUS at 09:46

## 2021-11-20 RX ADMIN — DEXAMETHASONE SODIUM PHOSPHATE 10 MG: 4 INJECTION, SOLUTION INTRAMUSCULAR; INTRAVENOUS at 22:11

## 2021-11-20 RX ADMIN — AMPICILLIN SODIUM AND SULBACTAM SODIUM 3 G: 2; 1 INJECTION, POWDER, FOR SOLUTION INTRAMUSCULAR; INTRAVENOUS at 23:14

## 2021-11-20 RX ADMIN — HYDROMORPHONE HYDROCHLORIDE 0.5 MG: 1 INJECTION, SOLUTION INTRAMUSCULAR; INTRAVENOUS; SUBCUTANEOUS at 11:22

## 2021-11-20 RX ADMIN — AMPICILLIN SODIUM AND SULBACTAM SODIUM 3 G: 2; 1 INJECTION, POWDER, FOR SOLUTION INTRAMUSCULAR; INTRAVENOUS at 11:51

## 2021-11-20 RX ADMIN — ONDANSETRON 4 MG: 2 INJECTION INTRAMUSCULAR; INTRAVENOUS at 22:12

## 2021-11-20 RX ADMIN — KETOROLAC TROMETHAMINE 30 MG: 30 INJECTION, SOLUTION INTRAMUSCULAR; INTRAVENOUS at 22:12

## 2021-11-20 RX ADMIN — HYDROMORPHONE HYDROCHLORIDE 0.5 MG: 1 INJECTION, SOLUTION INTRAMUSCULAR; INTRAVENOUS; SUBCUTANEOUS at 09:24

## 2021-11-20 ASSESSMENT — ACTIVITIES OF DAILY LIVING (ADL)
ADLS_ACUITY_SCORE: 4
ADLS_ACUITY_SCORE: 7
ADLS_ACUITY_SCORE: 4

## 2021-11-20 ASSESSMENT — MIFFLIN-ST. JEOR: SCORE: 1188.96

## 2021-11-20 NOTE — ED PROVIDER NOTES
History     Chief Complaint   Patient presents with     Facial Swelling     Post-op Problem     HPI  Noemí Garcia is a 31 year old woman with history of bipolar disorder, anxiety, Hodgkin's disease, recently submandibular cyst status post multiple surgeries/excisions complicated by ranula and complex cystic lesion who presents for evaluation of worsening swelling of her right subareolar area as well as pain.  She also reports pain with swallowing but still able to manage her secretions, no difficulty breathing.  She denies any fevers or chills, has started to notice some redness overlying the swelling which has increased over the last few days.  Patient recently seen 6 days ago and started on Augmentin and steroids, had a follow-up with Dr. Marquez on 11/16 (4 days ago) and things seem to be improving but have declined since then  She reports some occasional nausea but no vomiting, no chest pain or abdominal pain or any other complaints.    Allergies:  Allergies   Allergen Reactions     Adhesive Tape Other (See Comments)     Blistered skin       Problem List:    Patient Active Problem List    Diagnosis Date Noted     Postpartum endometritis 07/30/2021     Priority: Medium     History of lymphoma 07/28/2021     Priority: Medium     ASCUS with positive high risk HPV cervical 07/28/2021     Priority: Medium     Hodgkin's disease of lymph nodes of multiple sites (H) 11/12/2019     Priority: Medium     Bipolar II disorder, moderate, depressed, with anxious distress (H) 10/05/2018     Priority: Medium     Deviated nasal septum 03/10/2016     Priority: Medium     Generalized anxiety disorder 03/02/2016     Priority: Medium     Kidney stones 06/16/2015     Priority: Medium     Renal colic 05/23/2015     Priority: Medium     Anal fissure 07/10/2013     Priority: Medium        Past Medical History:    Past Medical History:   Diagnosis Date     Abnormal Pap smear of cervix      Cancer (H)      Cerebral infarction (H)       "Depressive disorder        Past Surgical History:    Past Surgical History:   Procedure Laterality Date     AS REMOVE GROIN LYMPH NODES       BONE MARROW BIOPSY       COLONOSCOPY       COLPOSCOPY       EXTRACORPOREAL SHOCK WAVE LITHOTRIPSY, CYSTOSCOPY, INSERT STENT URETER(S), COMBINED       GALLBLADDER SURGERY       HEAD & NECK SURGERY      cyst removal     KIDNEY STONE SURGERY       RHINOPLASTY         Family History:    Family History   Problem Relation Age of Onset     No Known Problems Mother      Heart Disease Father         MIs     No Known Problems Sister      No Known Problems Sister      No Known Problems Brother      No Known Problems Brother      No Known Problems Brother      Alzheimer Disease Maternal Grandmother      Heart Disease Maternal Grandfather         MI or stroke?     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  All other systems reviewed and found to be negative.    Physical Exam   BP: 116/78  Pulse: 71  Temp: 98  F (36.7  C)  Resp: 18  Height: 157.5 cm (5' 2\")  Weight: 53.1 kg (117 lb)  SpO2: 98 " %      Physical Exam  Gen: Lying in bed, appears uncomfortable and in mild to moderate pain, alert, nontoxic  HEENT: Swelling of the right submandibular triangle with overlying erythema, measuring approximately 5 to 6 cm x 3 to 4 cm, mild trismus, posterior oropharynx however clear without evidence of edema, no stridor on auscultation of the neck, swelling does not cross midline.  Uvula also midline.  Managing secretions well  CV: Regular rate and rhythm, appears warm well perfused  Pulm: Clear bilaterally, normal respiratory effort  Abd: Soft, nontender, nondistended  Skin: Other than erythema to right submandibular area of the neck, no rash or other lesions  MSK: No gross deformities or swelling  Neuro: Alert and oriented x4, no focal deficits    ED Course     ED Course as of 11/20/21 1109   Sat Nov 20, 2021   0902 Patient evaluated, here with worsening swelling of her right floor of mouth, has ranula but has been monitored closely, patient started on Augmentin and steroids and initially had mild improvement but not worsened over the past few days.  Bedside ultrasound was performed, this demonstrated approximately 2 x 4 to 5 cm complex cystic fluid lesion.  We will plan to repeat labs including CRP, there is mild erythema which is new but patient is afebrile, no evidence of airway compromise, will anticipate discussing with ENT at [facility], patient follows with Dr. Marquez   0915 CBC with normal white count, no left shift   0926 BMP unremarkable.  CRP is slightly elevated to 19 from 10 on 11/14   0927 I spoke with Dr. Montgomery with ENT at [facility], he requested a CT with contrast and to have this pushed to [facility] so he can review, anticipate patient will need to be transferred to [facility] ER for evaluation   1017 I spoke again with Dr. Montgomery, he has not yet had a chance to get the images at [facility], therefore I spoke with radiology here about pushing his images, he will reach out to [facility] to confirm  that images were saved in the PACS system   1033 I spoke with Dr. Montgomery again, he agreed swelling is worse, recommends patient go to [facility] ER for ENT evaluation. Will speak with [facility] transfer   1040 I spoke with Dr. Cervantes in the ER at [facility], he will speak further with Dr. Montgomery about best next steps as no beds or boarding capacity at [facility] available at this time so would be unable to admit if needed   1059 Patient accepted to the ER at [facility] to be evaluated by Dr. Montgomery with ENT, however would need to arrange for transport back which may be complicated as much as one would need to do this and likely would not be capable of providing this care, will speak with patient about going by private car which would be much more feasible option.  They would not be able to board patient in the ER at [facility] and they have no beds available at this time to admit.  Will give dose of Unasyn here     Procedures    Results for orders placed during the hospital encounter of 11/20/21    POC US SOFT TISSUE    Impression  Limited Soft Tissue Ultrasound, performed and interpreted by me.    Indication:  Skin redness warmth pain. Evaluate for cellulitis vs abscess.    Body location: right upper neck, submandibular space/floor of mouth    Findings:  There is no cobblestoning suggestive of cellulitis in the evaluated area. There is a fluid collection measuring approximately 2 x 2 x 4.5 cm to suggest abscess vs complex cystic lesion. No foreign body identified    IMPRESSION: Interval increase in size of complex cystic lesion of right submandibular space            Critical Care time:  none               Results for orders placed or performed during the hospital encounter of 11/20/21 (from the past 24 hour(s))   POC US SOFT TISSUE    Impression    Limited Soft Tissue Ultrasound, performed and interpreted by me.    Indication:  Skin redness warmth pain. Evaluate for cellulitis vs abscess.     Body location: right  upper neck, submandibular space/floor of mouth    Findings:  There is no cobblestoning suggestive of cellulitis in the evaluated area. There is a fluid collection measuring approximately 2 x 2 x 4.5 cm to suggest abscess vs complex cystic lesion. No foreign body identified    IMPRESSION: Interval increase in size of complex cystic lesion of right submandibular space         CBC with platelets differential    Narrative    The following orders were created for panel order CBC with platelets differential.  Procedure                               Abnormality         Status                     ---------                               -----------         ------                     CBC with platelets and d...[[id number]]                      Final result                 Please view results for these tests on the individual orders.   Basic metabolic panel   Result Value Ref Range    Sodium 136 134 - 144 mmol/L    Potassium 3.9 3.5 - 5.1 mmol/L    Chloride 104 98 - 107 mmol/L    Carbon Dioxide (CO2) 21 21 - 31 mmol/L    Anion Gap 11 3 - 14 mmol/L    Urea Nitrogen 13 7 - 25 mg/dL    Creatinine 0.71 0.60 - 1.20 mg/dL    Calcium 9.6 8.6 - 10.3 mg/dL    Glucose 84 70 - 105 mg/dL    GFR Estimate >90 >60 mL/min/1.73m2   C-Reactive Protein high sensitivity GH   Result Value Ref Range    C-Reactive Protein High Sensitivity 19.3 mg/L   CBC with platelets and differential   Result Value Ref Range    WBC Count 8.6 4.0 - 11.0 10e3/uL    RBC Count 4.57 3.80 - 5.20 10e6/uL    Hemoglobin 13.3 11.7 - 15.7 g/dL    Hematocrit 38.9 35.0 - 47.0 %    MCV 85 78 - 100 fL    MCH 29.1 26.5 - 33.0 pg    MCHC 34.2 31.5 - 36.5 g/dL    RDW 12.6 10.0 - 15.0 %    Platelet Count 282 150 - 450 10e3/uL    % Neutrophils 71 %    % Lymphocytes 17 %    % Monocytes 7 %    % Eosinophils 4 %    % Basophils 0 %    % Immature Granulocytes 1 %    NRBCs per 100 WBC 0 <1 /100    Absolute Neutrophils 6.2 1.6 - 8.3 10e3/uL    Absolute Lymphocytes 1.5 0.8 - 5.3 10e3/uL     Absolute Monocytes 0.6 0.0 - 1.3 10e3/uL    Absolute Eosinophils 0.3 0.0 - 0.7 10e3/uL    Absolute Basophils 0.0 0.0 - 0.2 10e3/uL    Absolute Immature Granulocytes 0.0 <=0.0 10e3/uL    Absolute NRBCs 0.0 10e3/uL   CT Soft Tissue Neck w Contrast    Narrative    PROCEDURE INFORMATION:   Exam: CT Neck With Contrast   Exam date and time: 11/20/2021 9:31 AM   Age: 31 years old   Clinical indication: Other: Recent surgery; Prior surgery; Surgery date: <1   month; Patient HX: C/O increase swelling and pain to right side of neck   following salivary gland removal. Was seen in er last week for swelling, placed   on antibiotic at that time, is taking it as prescribed, swelling has increased   and pain is markedly worse.     TECHNIQUE:   Imaging protocol: Computed tomography images of the neck with contrast.   Radiation optimization: All CT scans at this facility use at least one of these   dose optimization techniques: automated exposure control; mA and/or kV   adjustment per patient size (includes targeted exams where dose is matched to   clinical indication); or iterative reconstruction.   Contrast material: ISOVUE 370; Contrast volume: 100 ml; Contrast route:   INTRAVENOUS (IV);      COMPARISON:   CT SOFT TISSUE NECK W CONTRAST 11/14/2021 12:40 PM     FINDINGS:   Brain: The visualized brain is within normal limits. No significant mass effect   upon the airway.     Paranasal sinuses: Air-fluid level in the right maxillary sinus as before.   Nasopharynx: Unremarkable.   Oropharynx: Unremarkable. No significant tonsillar enlargement.   Hypopharynx: Unremarkable.   Larynx: Unremarkable. Normal epiglottis.   Retropharyngeal space: Unremarkable.   Submandibular/Parotid glands: Slight hypoenhancement of the right submandibular   gland in comparison to the left may indicate associated inflammatory changes   within the submandibular gland. No submandibular gland stone.   Thyroid: Normal. No enlarged or calcified nodules.    Lymph  nodes: No confluent lymphadenopathy. Likely reactive nodes along both   sides the neck without confluent lymphadenopathy.     Trachea: Visualized trachea is unremarkable.   Lungs: The included lungs are grossly clear.   Bones/joints: No acute fracture.   Vasculature: No venous thrombus.   Soft tissues: Rim enhancing lobulated structure in the submandibular region on   the right, with surrounding fatty stranding and skin thickening, is appreciated   at 4.4 x 3.4 x 2.7 cm, enlarged relative to the most recent prior. A portion of   this extends into the skin of the submandibular region, better seen than on the   prior.       Impression    IMPRESSION:   Enlarging rim enhancing lesion in the right neck as described.  This could be   an infected granuloma or dustin abscess.  No mass effect upon the airway.  No   confluent lymphadenopathy or venous thrombus.  Consider fluid   sampling/drainage.  Possible secondary involvement of the right submandibular   gland.    THIS DOCUMENT HAS BEEN ELECTRONICALLY SIGNED BY MANUEL LUNA MD   Asymptomatic COVID-19 Virus (Coronavirus) by PCR Nose    Specimen: Nose; Swab   Result Value Ref Range    SARS CoV2 PCR Negative Negative    Narrative    Testing was performed using the Xpert Xpress SARS-CoV-2 Assay on the   Cepheid Gene-Xpert Instrument Systems. Additional information about   this Emergency Use Authorization (EUA) assay can be found via the Lab   Guide. This test should be ordered for the detection of SARS-CoV-2 in   individuals who meet SARS-CoV-2 clinical and/or epidemiological   criteria. Test performance is unknown in asymptomatic patients. This   test is for in vitro diagnostic use under the FDA EUA for   laboratories certified under CLIA to perform high complexity testing.   This test has not been FDA cleared or approved. A negative result   does not rule out the presence of PCR inhibitors in the specimen or   target RNA in concentration below the limit of detection for  the   assay. The possibility of a false negative should be considered if   the patient's recent exposure or clinical presentation suggests   COVID-19. This test was validated by [facility] and [facility]. This laboratory is certified under the Clinic  al Laboratory Improvement Amendments (CLIA) as qualified to perform high complex  ity clinical laboratory testing.       Medications   ampicillin-sulbactam (UNASYN) 3 g vial to attach to  mL bag (has no administration in time range)   HYDROmorphone (PF) (DILAUDID) injection 0.5 mg (0.5 mg Intravenous Given 11/20/21 0924)   ketorolac (TORADOL) injection 15 mg (15 mg Intravenous Given 11/20/21 0924)   iopamidol (ISOVUE-370) solution 100 mL (100 mLs Intravenous Given 11/20/21 0946)       Assessments & Plan (with Medical Decision Making)   31-year-old woman with history of bipolar disorder, anxiety, Hodgkin's disease, recently submandibular cyst status post multiple surgeries/excisions complicated by ranula and complex cystic lesion who presents for evaluation of worsening swelling of her right subareolar area as well as pain, found to have worsening fluid collection with overlying erythema of the right submandibular space as confirmed by ultrasound and then CT of the neck which was requested by Dr. Montgomery with ENT at [facility] who was consulted on this patient.  Patient otherwise vitally stable and afebrile, no leukocytosis, CRP only mildly elevated compared to recent prior from 6 days ago.  No concern for sepsis but with overlying erythema and worsening swelling and size of complex fluid collection, differential includes abscess versus malignancy versus worsening mucocele/ranula.  Dilaudid provided for pain here in the emergency department.  See pertinent MDM and ED course above, in brief patient accepted to [facility] ER for evaluation and likely drainage by Dr. Montgomery, she will go there by ambulance as it is medically  indicated, will give dose of Unasyn for antibiotics here, given no beds currently available at [facility] she will arrange for transport via private vehicle back to [facility] after intervention there if needed.  Patient expressed understanding of plan of care, transferred to [facility] ER in stable condition for further cares.    I have reviewed the nursing notes.    I have reviewed the findings, diagnosis, plan and need for follow up with the patient.       New Prescriptions    No medications on file       Final diagnoses:   Submandibular abscess       11/20/2021   [facility]  11/20/21 9054

## 2021-11-20 NOTE — ED TRIAGE NOTES
PT to ER from home with SO with c/o increase swelling and pain to right side of neck following salivary gland removal.  Was seen in ER last week for swelling, placed on antibiotic at that time, is taking it as prescribed, swelling has increased and pain is markedly worse. Last ibuprofen at 0430.

## 2021-11-20 NOTE — PROGRESS NOTES
Spoke with patient regarding pending transfer to [facility] in [place], [place] and return to [facility]. Patient is accepting of transfer to [facility] via ambulance and will arrange transportation back to home or [facility] if further care needed.

## 2021-11-21 PROCEDURE — 99232 SBSQ HOSP IP/OBS MODERATE 35: CPT | Performed by: FAMILY MEDICINE

## 2021-11-21 PROCEDURE — 250N000011 HC RX IP 250 OP 636: Performed by: FAMILY MEDICINE

## 2021-11-21 PROCEDURE — 258N000003 HC RX IP 258 OP 636: Performed by: FAMILY MEDICINE

## 2021-11-21 PROCEDURE — 120N000001 HC R&B MED SURG/OB

## 2021-11-21 PROCEDURE — 250N000013 HC RX MED GY IP 250 OP 250 PS 637: Performed by: STUDENT IN AN ORGANIZED HEALTH CARE EDUCATION/TRAINING PROGRAM

## 2021-11-21 RX ORDER — IBUPROFEN 600 MG/1
600 TABLET, FILM COATED ORAL EVERY 6 HOURS PRN
Status: DISCONTINUED | OUTPATIENT
Start: 2021-11-21 | End: 2021-11-22 | Stop reason: HOSPADM

## 2021-11-21 RX ADMIN — DEXAMETHASONE SODIUM PHOSPHATE 10 MG: 4 INJECTION, SOLUTION INTRAMUSCULAR; INTRAVENOUS at 10:19

## 2021-11-21 RX ADMIN — ONDANSETRON 4 MG: 2 INJECTION INTRAMUSCULAR; INTRAVENOUS at 08:38

## 2021-11-21 RX ADMIN — IBUPROFEN 600 MG: 600 TABLET, FILM COATED ORAL at 14:52

## 2021-11-21 RX ADMIN — AMPICILLIN SODIUM AND SULBACTAM SODIUM 3 G: 2; 1 INJECTION, POWDER, FOR SOLUTION INTRAMUSCULAR; INTRAVENOUS at 23:26

## 2021-11-21 RX ADMIN — AMPICILLIN SODIUM AND SULBACTAM SODIUM 3 G: 2; 1 INJECTION, POWDER, FOR SOLUTION INTRAMUSCULAR; INTRAVENOUS at 18:06

## 2021-11-21 RX ADMIN — AMPICILLIN SODIUM AND SULBACTAM SODIUM 3 G: 2; 1 INJECTION, POWDER, FOR SOLUTION INTRAMUSCULAR; INTRAVENOUS at 04:13

## 2021-11-21 RX ADMIN — KETOROLAC TROMETHAMINE 30 MG: 30 INJECTION, SOLUTION INTRAMUSCULAR; INTRAVENOUS at 08:37

## 2021-11-21 RX ADMIN — AMPICILLIN SODIUM AND SULBACTAM SODIUM 3 G: 2; 1 INJECTION, POWDER, FOR SOLUTION INTRAMUSCULAR; INTRAVENOUS at 11:57

## 2021-11-21 RX ADMIN — VANCOMYCIN HYDROCHLORIDE 1000 MG: 1 INJECTION, POWDER, LYOPHILIZED, FOR SOLUTION INTRAVENOUS at 12:40

## 2021-11-21 RX ADMIN — DEXAMETHASONE SODIUM PHOSPHATE 10 MG: 4 INJECTION, SOLUTION INTRAMUSCULAR; INTRAVENOUS at 04:15

## 2021-11-21 ASSESSMENT — ACTIVITIES OF DAILY LIVING (ADL)
ADLS_ACUITY_SCORE: 4

## 2021-11-21 NOTE — H&P
[facility]    History and Physical  Hospitalist       Date of Admission:  11/20/2021    Assessment & Plan   Noemí Garcia is a 31 year old female who presents with failed outpatient treatment of submandibular abscess.    Active Problems:    Submandibular abscess    Assessment: Treated with Augmentin and infection worsened. Seen for needle aspiration 11/20/2021 by Dr Montgomery ENT at [facility]. Needs further IV antibiotics and steroids. CRP elevated, WBC normal.    Plan: Admit   IV Unasyn 3 g q 6 hrs x 3 per ENT   IV Decadron 10 mg IV x 3 per ENT   Dilaudid for pain   Zofran for nausea       DVT Prophylaxis: Low Risk/Ambulatory with no VTE prophylaxis indicated  Code Status: Prior    Mitchel GIL [facility]    Primary Care Physician   Physician No Ref-Primary    Chief Complaint   Submandibular abscess    History is obtained from the patient and chart review.    History of Present Illness   Noemí Garcia is a 31 year old female who presents with a right submandibular gland abscess. She had a recurrent ranula removed by Dr Marquez on 10/21. Seen in ED 11/14 with swelling and was placed on steroids and Augmentin. Saw Dr Marquez on 11/16 and was improving. Today she presented to the ED for more swelling. CT showed an abscess increased in size compared to previous scan. She went to [facility] and saw Dr Montgomery of ENT who performed needle drainage of 20 mL of fluid. Received Unasyn in [facility] ED, then another dose at [facility] along with Decadron 10 mg IV. They did not have the ability to board her in the ED or admit her, so she was told to return to [facility] for admission since we had a bed. Recommendations are for 3 more Unasyn and Decadron doses.  Since drainage she can open her mouth a little better and swallow better. No airway issues. Nauseated and vomited on her way back from [facility], she thinks due to pain medication. Diarrhea this past week, likely from antibiotics. Fevers off and on, none currently.    Past  Medical History    I have reviewed this patient's medical history and updated it with pertinent information if needed.   Past Medical History:   Diagnosis Date     Abnormal Pap smear of cervix      Cancer (H)      Cerebral infarction (H)      Depressive disorder        Past Surgical History   I have reviewed this patient's surgical history and updated it with pertinent information if needed.  Past Surgical History:   Procedure Laterality Date     AS REMOVE GROIN LYMPH NODES       BONE MARROW BIOPSY       COLONOSCOPY       COLPOSCOPY       EXTRACORPOREAL SHOCK WAVE LITHOTRIPSY, CYSTOSCOPY, INSERT STENT URETER(S), COMBINED       GALLBLADDER SURGERY       HEAD & NECK SURGERY      cyst removal     KIDNEY STONE SURGERY       RHINOPLASTY         Prior to Admission Medications   Prior to Admission Medications   Prescriptions Last Dose Informant Patient Reported? Taking?   HYDROcodone-acetaminophen (NORCO) 5-325 MG tablet   Yes No   Sig: TAKE 1 TABLET BY MOUTH EVERY 6 HOURS AS NEEDED PAIN   Prenatal Vit-Fe Fumarate-FA (PRENATAL MULTIVITAMIN W/IRON) 27-0.8 MG tablet  Self Yes No   Sig: Take 1 tablet by mouth daily   amoxicillin-clavulanate (AUGMENTIN) 875-125 MG tablet   No No   Sig: Take 1 tablet by mouth 2 times daily for 10 days   clindamycin-benzoyl peroxide (BENZACLIN) 1-5 % external gel  Self No No   Sig: Apply topically 2 times daily   dexamethasone (DECADRON) 4 MG tablet   No No   Sig: Take 1 tablet (4 mg) by mouth 2 times daily (with meals) for 3 days   ibuprofen (ADVIL/MOTRIN) 200 MG tablet  Self Yes No   Sig: Take 600 mg by mouth every 6 hours as needed for mild pain      Facility-Administered Medications: None     Allergies   Allergies   Allergen Reactions     Adhesive Tape Other (See Comments)     Blistered skin       Social History   I have reviewed this patient's social history and updated it with pertinent information if needed. Noemí Garcia  reports that she has quit smoking. Her smoking use included vaping  device. She has never used smokeless tobacco. She reports previous alcohol use. She reports previous drug use. Drug: Methamphetamines.    Family History   I have reviewed this patient's family history and updated it with pertinent information if needed.   Family History   Problem Relation Age of Onset     No Known Problems Mother      Heart Disease Father         MIs     No Known Problems Sister      No Known Problems Sister      No Known Problems Brother      No Known Problems Brother      No Known Problems Brother      Alzheimer Disease Maternal Grandmother      Heart Disease Maternal Grandfather         MI or stroke?     Heart Disease Paternal Grandmother        Review of Systems     REVIEW OF SYSTEMS:    General: Denies general constitutional problems  Eyes: Denies problems  Cardiovascular: Denies problems  Respiratory: Denies problems  Genitourinary: Denies problems  Musculoskeletal: Denies problems  Neurologic: Denies problems  Psychiatric: Denies problems      Physical Exam   Temp: 98.6  F (37  C)   BP: 110/75 Pulse: 65   Resp: 18 SpO2: 98 %      Vital Signs with Ranges  Temp:  [98  F (36.7  C)-98.6  F (37  C)] 98.6  F (37  C)  Pulse:  [51-71] 65  Resp:  [18] 18  BP: ()/(62-78) 110/75  SpO2:  [95 %-99 %] 98 %  117 lbs 1.03 oz    General Appearance: Pleasant, alert, appropriate appearance for age. No acute distress  Eyes: EOMI, PERRL, no conjunctival injection  OroPharynx Exam: Normal pharynx.  Neck Exam: right submandibular swelling with significant induration and pain, but no erythema  Chest/Respiratory Exam: Normal chest wall and respirations. Clear to auscultation.  Cardiovascular Exam: Regular rate and rhythm. S1, S2, no murmur, click, gallop, or rubs.  Gastrointestinal Exam: Soft, nontender, no abnormal masses or organomegaly.  Extremities: 2+ pedal pulses.  No lower extremity edema.  Neuro Exam: Alert, oriented x 3. CN II-XI intact. Strength symmetric upper and lower extremities  Psychiatric:  Normal affect and mentation    Data   Data reviewed today:  I personally reviewed no images or EKG's today.  Recent Labs   Lab 11/20/21  0852 11/14/21  0955   WBC 8.6 6.6   HGB 13.3 13.2   MCV 85 86    231     --    POTASSIUM 3.9  --    CHLORIDE 104  --    CO2 21  --    BUN 13  --    CR 0.71  --    ANIONGAP 11  --    GLENDY 9.6  --    GLC 84  --        Recent Results (from the past 24 hour(s))   POC US SOFT TISSUE    Impression    Limited Soft Tissue Ultrasound, performed and interpreted by me.    Indication:  Skin redness warmth pain. Evaluate for cellulitis vs abscess.     Body location: right upper neck, submandibular space/floor of mouth    Findings:  There is no cobblestoning suggestive of cellulitis in the evaluated area. There is a fluid collection measuring approximately 2 x 2 x 4.5 cm to suggest abscess vs complex cystic lesion. No foreign body identified    IMPRESSION: Interval increase in size of complex cystic lesion of right submandibular space         CT Soft Tissue Neck w Contrast    Narrative    PROCEDURE INFORMATION:   Exam: CT Neck With Contrast   Exam date and time: 11/20/2021 9:31 AM   Age: 31 years old   Clinical indication: Other: Recent surgery; Prior surgery; Surgery date: <1   month; Patient HX: C/O increase swelling and pain to right side of neck   following salivary gland removal. Was seen in er last week for swelling, placed   on antibiotic at that time, is taking it as prescribed, swelling has increased   and pain is markedly worse.     TECHNIQUE:   Imaging protocol: Computed tomography images of the neck with contrast.   Radiation optimization: All CT scans at this facility use at least one of these   dose optimization techniques: automated exposure control; mA and/or kV   adjustment per patient size (includes targeted exams where dose is matched to   clinical indication); or iterative reconstruction.   Contrast material: ISOVUE 370; Contrast volume: 100 ml; Contrast route:    INTRAVENOUS (IV);      COMPARISON:   CT SOFT TISSUE NECK W CONTRAST 11/14/2021 12:40 PM     FINDINGS:   Brain: The visualized brain is within normal limits. No significant mass effect   upon the airway.     Paranasal sinuses: Air-fluid level in the right maxillary sinus as before.   Nasopharynx: Unremarkable.   Oropharynx: Unremarkable. No significant tonsillar enlargement.   Hypopharynx: Unremarkable.   Larynx: Unremarkable. Normal epiglottis.   Retropharyngeal space: Unremarkable.   Submandibular/Parotid glands: Slight hypoenhancement of the right submandibular   gland in comparison to the left may indicate associated inflammatory changes   within the submandibular gland. No submandibular gland stone.   Thyroid: Normal. No enlarged or calcified nodules.    Lymph nodes: No confluent lymphadenopathy. Likely reactive nodes along both   sides the neck without confluent lymphadenopathy.     Trachea: Visualized trachea is unremarkable.   Lungs: The included lungs are grossly clear.   Bones/joints: No acute fracture.   Vasculature: No venous thrombus.   Soft tissues: Rim enhancing lobulated structure in the submandibular region on   the right, with surrounding fatty stranding and skin thickening, is appreciated   at 4.4 x 3.4 x 2.7 cm, enlarged relative to the most recent prior. A portion of   this extends into the skin of the submandibular region, better seen than on the   prior.       Impression    IMPRESSION:   Enlarging rim enhancing lesion in the right neck as described.  This could be   an infected granuloma or dustin abscess.  No mass effect upon the airway.  No   confluent lymphadenopathy or venous thrombus.  Consider fluid   sampling/drainage.  Possible secondary involvement of the right submandibular   gland.    THIS DOCUMENT HAS BEEN ELECTRONICALLY SIGNED BY MANUEL LUNA MD

## 2021-11-21 NOTE — PROGRESS NOTES
[facility]    Hospitalist Progress Note      Assessment & Plan   Noemí Garcia is a 31 year old female who was admitted on 11/20/2021 for a submandibular abscess.  She had a recurrent ranula removed by Dr Marquez on 10/21. Seen in ED 11/14 with swelling and was placed on steroids and Augmentin. Saw Dr Marquez on 11/16 and was improving. On 11/20 she presented to the ED for more swelling. CT showed an abscess increased in size compared to previous scan. She went to [facility] and saw Dr Montgomery of ENT who performed needle drainage of 20 mL of fluid. Received Unasyn in [facility] ED, then another dose at [facility] along with Decadron 10 mg IV. They did not have the ability to board her in the ED or admit her, so she was told to return to [facility] for admission since we had a bed.    Active Problems:    Submandibular abscess    Assessment: Treated with Augmentin and infection worsened. Seen for needle aspiration 11/20/2021 by Dr Montgomery ENT at [facility]. Needs further IV antibiotics and steroids. CRP elevated, WBC normal.    Plan: Continue IV antibiotics since the area is not clearly improved   Continue IV Unasyn 3 g q 6 hrs per ENT   Start Vancomycin dosed per pharmacy for potential MRSA coverage given clear lack of improvement with Augmentin and Unasyn   Culture obtained at [facility], ID and sensitivity may be available 11/22              IV Decadron 10 mg IV x 3 per ENT, which was completed. Decadron stopped 11/21/2021              Dilaudid and Toradol for pain              Zofran for nausea    Diet: Combination Diet Regular Diet Adult    DVT Prophylaxis: Low Risk/Ambulatory with no VTE prophylaxis indicated  Moreland Catheter: Not present  Code Status: Full Code           Disposition Plan   Expected discharge:  tomorrow recommended to prior living arrangement once antibiotic plan established.  Entered: Mitchel Baker MD 11/21/2021, 11:06 AM       The patient's care was discussed with the Patient and  pharmacy.    Mitchel Baker MD  Hospitalist Service  [facility]  Contact information available via [facility] Paging/Directory    ______________________________________________________________________    Interval History   CC: submandibular abscess  Thickness decreased. Still similar in diameter. Swallow and voice not impacted. She would like to return home, but wants the infection treated.    -Data reviewed today: I reviewed all new labs and imaging results over the last 24 hours. I personally reviewed no images or EKG's today.    Physical Exam   Temp: 97.9  F (36.6  C) Temp src: Tympanic BP: 106/72 Pulse: 68   Resp: 16 SpO2: 99 % O2 Device: None (Room air)    Vitals:    11/20/21 1950 11/20/21 2130   Weight: 53.1 kg (117 lb 1 oz) 53.8 kg (118 lb 8 oz)     Vital Signs with Ranges  Temp:  [97.5  F (36.4  C)-98.6  F (37  C)] 97.9  F (36.6  C)  Pulse:  [51-69] 68  Resp:  [16-18] 16  BP: ()/(53-75) 106/72  SpO2:  [95 %-99 %] 99 %  No intake/output data recorded.    General Appearance: Alert. No acute distress  Neck: Similar diameter of indurated area as on admission. Less prominence. Still no erythema.  Chest/Respiratory Exam: Clear to auscultation bilaterally  Cardiovascular Exam: Regular rate and rhythm. S1, S2, no murmur, gallop, or rubs.  Psychiatric: Normal affect and mentation        Medications       ampicillin-sulbactam (UNASYN) IV  3 g Intravenous Q6H     dexamethasone  10 mg Intravenous Q6H     sodium chloride (PF)  3 mL Intracatheter Q8H       Data   Recent Labs   Lab 11/20/21  0852   WBC 8.6   HGB 13.3   MCV 85         POTASSIUM 3.9   CHLORIDE 104   CO2 21   BUN 13   CR 0.71   ANIONGAP 11   GLENDY 9.6   GLC 84       No results found for this or any previous visit (from the past 24 hour(s)).

## 2021-11-21 NOTE — PHARMACY-VANCOMYCIN DOSING SERVICE
Pharmacy Vancomycin Initial Note  Date of Service 2021  Patient's  1990  31 year old, female    Indication: Abscess, mandibular, unsure of bone involvement    Current estimated CrCl = Estimated Creatinine Clearance: 95.7 mL/min (based on SCr of 0.71 mg/dL).    Creatinine for last 3 days  2021:  8:52 AM Creatinine 0.71 mg/dL    Recent Vancomycin Level(s) for last 3 days  No results found for requested labs within last 72 hours.      Vancomycin IV Administrations (past 72 hours)      No vancomycin orders with administrations in past 72 hours.                Nephrotoxins and other renal medications (From now, onward)    Start     Dose/Rate Route Frequency Ordered Stop    21 1200  vancomycin (VANCOCIN) 1,000 mg in sodium chloride 0.9 % 250 mL intermittent infusion         1,000 mg  over 60-90 Minutes Intravenous EVERY 12 HOURS 21 1117      21 2200  ampicillin-sulbactam (UNASYN) 3 g vial to attach to  mL bag         3 g  over 15-30 Minutes Intravenous EVERY 6 HOURS 21 2145      21 214  ketorolac (TORADOL) injection 30 mg         30 mg Intravenous EVERY 6 HOURS PRN 21 2145 21 2144          Contrast Orders - past 72 hours (72h ago, onward)            None          [employer] Prediction of Planned Initial Vancomycin Regimen  Loading dose: N/A  Regimen: 1000 mg IV every 12 hours.  Start time: 11:18 on 2021  Exposure target: AUC24 (range)400-600 mg/L.hr   AUC24,ss: 522 mg/L.hr  Probability of AUC24 > 400: 77 %  Ctrough,ss: 15.1 mg/L  Probability of Ctrough,ss > 20: 28 %  Probability of nephrotoxicity (Lodise YURY ): 10 %          Plan:  1. Start vancomycin  1000 mg IV q 12 h.   2. Vancomycin monitoring method: AUC  3. Vancomycin therapeutic monitoring goal: 400-600 mg*h/L  4. Pharmacy will check vancomycin levels as appropriate in 1-3 Days.    5. Serum creatinine levels will be ordered daily for the first week of therapy and at least twice  weekly for subsequent weeks.      Ana Flores RPH    Pending microbiology from [facility], continue or stop. When that is determined, can order level if continued on Monday.  Ana Flores RPH on 11/21/2021 at 11:54 AM

## 2021-11-21 NOTE — PLAN OF CARE
Pt is here for a submandibular abscess. VSS. Pain controlled with PRN Toradol now switched to oral ibuprohen, see MAR. Given 2X this shift. Right side of neck red to color. Lump noted to palpation but is smaller in size, per patient. Had a small amt of drainage overnight, none this shift. Lung sounds clear. HRR stable. Pt having nausea this morning. PRN Zofran given. Abdomen soft, non-tender. Voiding appropriately. Oral intake good. IV saline locked, receiving scheduled antibiotics. Indep in room.  in room most of day. Sanna Hicks RN on 11/21/2021 at 6:26 PM    Temp: 98.5  F (36.9  C) Temp src: Tympanic BP: 115/67 Pulse: 74   Resp: 16 SpO2: 97 % O2 Device: None (Room air)

## 2021-11-21 NOTE — PHARMACY-ADMISSION MEDICATION HISTORY
"Pharmacy -- Admission Medication Reconciliation    Prior to admission (PTA) medications were reviewed and the patient's PTA medication list was updated.    Sources Consulted: [employer], , [facility], patient on telephone    The reliability of this Medication Reconciliation is: Reliability: Reliable    The following significant changes were made:  Updated last doses  Removed dexamethasone (completed)  Removed hydrocodone/apap (patient did not take, threw away)    In addition, the patient's allergies were reviewed with the patient and updated with description as follows:   Allergies: Adhesive tape    The pharmacist has reviewed with the patient that all personal medications should be removed from the building or locked in the belongings safe.  Patient shall only take medications ordered by the physician and administered by the nursing staff.       Medication barriers identified: reports taking prenatal viitamins \"when remembers\", nursing reports patient breastfeeding, later said pumping and dumping, patient told me that she is transitioning to formula and dumping while here   Medication adherence concerns: see above   Understanding of emergency medications: n/a    Ana Flores [facility], 11/21/2021,  5:04 PM     "

## 2021-11-21 NOTE — PROGRESS NOTES
ED Nursing Triage Note (General)   ________________________________    Noemí JEROME Radha is a 31 year old Female that returns to [facility] from [facility] after procedure to receive IV abx

## 2021-11-21 NOTE — ED NOTES
[facility] RN reports pt has left their facility by private car en route back to [facility].  Abcess was drained for 20 ml brown fluid, pt given 3g Unison, 10 mg decadron, 1 mg dilaudid.  VSS. 20 g IV remains in place.

## 2021-11-21 NOTE — PLAN OF CARE
Patient is alert and orientated. Nausea resolved with Zofran. Pain in right side of jaw and into neck. PRN Toradol administered. Golf ball sized, firm mass on under side of right jaw. Tender to touch. Mass was drained at [facility] prior to admission. Surgical incision open to air. Small amount of reddish-brown fluid draining from incision site. Band-aid temporarily placed. Patient is independent in room.       Problem: Adult Inpatient Plan of Care  Goal: Plan of Care Review  Outcome: No Change  Problem: Adult Inpatient Plan of Care  Goal: Absence of Hospital-Acquired Illness or Injury  Outcome: No Change  Intervention: Identify and Manage Fall Risk  Recent Flowsheet Documentation  Taken 11/21/2021 0424 by Yesica Hoyos RN  Safety Promotion/Fall Prevention: safety round/check completed  Intervention: Prevent Skin Injury  Recent Flowsheet Documentation  Taken 11/21/2021 0424 by Yesica Hoyos RN  Body Position: position changed independently  Taken 11/20/2021 2130 by Yesica Hoyos RN  Body Position: position changed independently  Intervention: Prevent and Manage VTE (Venous Thromboembolism) Risk  Recent Flowsheet Documentation  Taken 11/21/2021 0424 by Yesica Hoyos, NIA  VTE Prevention/Management:   ambulation promoted   fluids promoted  Taken 11/20/2021 2130 by Yesica Hoyos RN  VTE Prevention/Management:   ambulation promoted   fluids promoted  Problem: Skin or Soft Tissue Infection  Goal: Infection Symptom Resolution  Outcome: No Change

## 2021-11-21 NOTE — PROGRESS NOTES
SAFETY CHECKLIST  ID Bands and Risk clasps correct and in place (DNR, Fall risk, Allergy, Latex, Limb):  Yes  All Lines Reconciled and labeled correctly: Yes  Whiteboard updated:Yes  Environmental interventions (bed/chair alarm on, call light, side rails, restraints, sitter....): Yes  Verify Tele #: not on tele    Sanna Hicks RN on 11/21/2021 at 7:10 AM

## 2021-11-21 NOTE — ED PROVIDER NOTES
31-year-old white female seen earlier today and was sent over to [facility] to be seen by ENT.  They drained 20 cc of brownish fluid from his right submandibular abscess.  This was done by Dr. Montgomery from ENT.  They gave her a dose of Unasyn and Decadron.  They did not have bed availability and did not want to keep her in their ER as a border.  They sent her back here to be admitted to our facility as we have beds available.    Discussed with Dr. FILIPPO Harrison will get patient admitted for additional doses of Unasyn and Decadron.  Recommendations was at least 3 more doses of the Unasyn and 3 more doses of the IV Decadron.    Patient is aware of the recommendations and was informed of the availability of a hospital bed here.  She is comfortable that plan.  No additional exam was done as this was basically prearranged admission through the previous ER doc and there is no change in her status.     Anne Marie Contreras MD  11/20/21 2052

## 2021-11-22 VITALS
HEART RATE: 63 BPM | SYSTOLIC BLOOD PRESSURE: 116 MMHG | OXYGEN SATURATION: 99 % | RESPIRATION RATE: 18 BRPM | DIASTOLIC BLOOD PRESSURE: 72 MMHG | TEMPERATURE: 97.9 F | WEIGHT: 120.2 LBS | BODY MASS INDEX: 21.98 KG/M2

## 2021-11-22 LAB
ANION GAP SERPL CALCULATED.3IONS-SCNC: 10 MMOL/L (ref 3–14)
BUN SERPL-MCNC: 17 MG/DL (ref 7–25)
CALCIUM SERPL-MCNC: 8.8 MG/DL (ref 8.6–10.3)
CHLORIDE BLD-SCNC: 107 MMOL/L (ref 98–107)
CO2 SERPL-SCNC: 22 MMOL/L (ref 21–31)
CREAT SERPL-MCNC: 0.68 MG/DL (ref 0.6–1.2)
CRP SERPL-MCNC: 13 MG/L
GFR SERPL CREATININE-BSD FRML MDRD: >90 ML/MIN/1.73M2
GLUCOSE BLD-MCNC: 93 MG/DL (ref 70–105)
POTASSIUM BLD-SCNC: 4.1 MMOL/L (ref 3.5–5.1)
SODIUM SERPL-SCNC: 139 MMOL/L (ref 134–144)

## 2021-11-22 PROCEDURE — 36415 COLL VENOUS BLD VENIPUNCTURE: CPT | Performed by: FAMILY MEDICINE

## 2021-11-22 PROCEDURE — 80048 BASIC METABOLIC PNL TOTAL CA: CPT | Performed by: FAMILY MEDICINE

## 2021-11-22 PROCEDURE — 258N000003 HC RX IP 258 OP 636: Performed by: FAMILY MEDICINE

## 2021-11-22 PROCEDURE — 99239 HOSP IP/OBS DSCHRG MGMT >30: CPT | Performed by: INTERNAL MEDICINE

## 2021-11-22 PROCEDURE — 86140 C-REACTIVE PROTEIN: CPT | Performed by: FAMILY MEDICINE

## 2021-11-22 PROCEDURE — 250N000011 HC RX IP 250 OP 636: Performed by: FAMILY MEDICINE

## 2021-11-22 RX ADMIN — VANCOMYCIN HYDROCHLORIDE 1000 MG: 1 INJECTION, POWDER, LYOPHILIZED, FOR SOLUTION INTRAVENOUS at 00:23

## 2021-11-22 RX ADMIN — AMPICILLIN SODIUM AND SULBACTAM SODIUM 3 G: 2; 1 INJECTION, POWDER, FOR SOLUTION INTRAMUSCULAR; INTRAVENOUS at 06:08

## 2021-11-22 ASSESSMENT — ACTIVITIES OF DAILY LIVING (ADL)
ADLS_ACUITY_SCORE: 4

## 2021-11-22 NOTE — PLAN OF CARE
Patient denied pain and nausea. Rested comfortably most of night. Mass on neck appears to have decreased in size. Right side of neck is red, firm, and tender. Incision is clean, dry, and intact. No drainage. Independent in room.

## 2021-11-22 NOTE — PROGRESS NOTES
Pramod, Pharmacist from [facility] called- ([phone]) with lab results.    Final gram stain culture shows:   Many gram positive cocci in chains    Wound Culture result:  Streptococcus angiosus     Sensitivities are still pending    Dr. Dee notified.    Milagros Mercer RN on 11/21/2021 at 7:18 PM

## 2021-11-22 NOTE — DISCHARGE SUMMARY
"[facility]  Hospitalist Discharge Summary      Date of Admission:  11/20/2021  Date of Discharge:  11/22/2021 11:09 AM  Discharging Provider: Yfn Sotomayor MD      Discharge Diagnoses   Active Problems:    Submandibular abscess    Hodgkins disease     Bipolar disorder    Follow-ups Needed After Discharge   Follow-up Appointments     Follow-up and recommended labs and tests       Follow up with ENT tomorrow AM as scheduled.               Discharge Disposition   Discharged to home  Condition at discharge: Stable      Hospital Course      Per Dr. Baker note:  \"She had a recurrent ranula removed by Dr Marquez on 10/21. Seen in ED 11/14 with swelling and was placed on steroids and Augmentin. Saw Dr Marquez on 11/16 and was improving. On 11/20 she presented to the ED for more swelling. CT showed an abscess increased in size compared to previous scan. She went to [facility] and saw Dr Montgomery of ENT who performed needle drainage of 20 mL of fluid. Received Unasyn in [facility] ED, then another dose at [facility] along with Decadron 10 mg IV. They did not have the ability to board her in the ED or admit her, so she was told to return to [facility] for admission since we had a bed.\"    She was treated with Unasyn here for 3 days. Wound culture from [facility]'s shows strep anginosis. By 11/22 she felt much better and swelling improved. She has follow up with ENT tomorrow and was discharged on augmentin, to complete a 14 day course of antibiotics.     Consultations This Hospital Stay   PHARMACY TO DOSE VANCO    Code Status   Full Code    Time Spent on this Encounter   I, Yfn Sotomayor MD, personally saw the patient today and spent greater than 30 minutes discharging this patient.       Yfn Sotomayor MD  [facility]  [address]  [place] [address]  Phone: [phone]  Fax: [phone]  ______________________________________________________________________    Physical Exam "   Vital Signs: Temp: 97.9  F (36.6  C) Temp src: Tympanic BP: 116/72 Pulse: 63   Resp: 18 SpO2: 99 % O2 Device: None (Room air)    Weight: 120 lbs 3.2 oz  GENERAL: Comfortable, no apparent distress.  CARDIOVASCULAR: regular rate and rhythm, no murmur. No lower extremity edema   RESPIRATORY: Clear to auscultation bilaterally, no wheezes or crackles.  GI: non-tender, non-distended, normal bowel sounds.   SKIN: warm periphery, no rashes         Primary Care Physician   Physician No Ref-Primary    Discharge Orders      Reason for your hospital stay    Strep anginosis abscess     Follow-up and recommended labs and tests     Follow up with ENT tomorrow AM as scheduled.     Activity    Your activity upon discharge: activity as tolerated     Diet    Soft foods, yogurt for the probiotics       Significant Results and Procedures   Most Recent 3 CBC's:Recent Labs   Lab Test 11/20/21  0852 11/14/21  0955 09/08/21  1212   WBC 8.6 6.6 5.5   HGB 13.3 13.2 14.5   MCV 85 86 87    231 191     Most Recent 3 BMP's:Recent Labs   Lab Test 11/22/21  0616 11/20/21  0852 08/02/21  0105 07/31/21  0602    136  --  138   POTASSIUM 4.1 3.9  --  4.1   CHLORIDE 107 104  --  109*   CO2 22 21  --  21   BUN 17 13  --  8   CR 0.68 0.71 0.53* 0.57*   ANIONGAP 10 11  --  8   GLENDY 8.8 9.6  --  8.3*   GLC 93 84  --  83     Most Recent 6 Bacteria Isolates From Any Culture (See EPIC Reports for Culture Details):Recent Labs   Lab Test 07/08/21  1001 03/18/21  1108 03/15/21  1318 02/13/21  1224 01/05/21  1010   CULT No Group B Streptococcus isolated Urine culture negative (no growth of uropathogens). Urine culture negative (no growth of uropathogens). 10,000 to 50,000 colonies/mL  mixed urogenital nellie   Urine culture negative (no growth of uropathogens).   ,   Results for orders placed or performed during the hospital encounter of 11/20/21   POC US SOFT TISSUE    Impression    Limited Soft Tissue Ultrasound, performed and interpreted by  me.    Indication:  Skin redness warmth pain. Evaluate for cellulitis vs abscess.     Body location: right upper neck, submandibular space/floor of mouth    Findings:  There is no cobblestoning suggestive of cellulitis in the evaluated area. There is a fluid collection measuring approximately 2 x 2 x 4.5 cm to suggest abscess vs complex cystic lesion. No foreign body identified    IMPRESSION: Interval increase in size of complex cystic lesion of right submandibular space         CT Soft Tissue Neck w Contrast    Narrative    PROCEDURE INFORMATION:   Exam: CT Neck With Contrast   Exam date and time: 11/20/2021 9:31 AM   Age: 31 years old   Clinical indication: Other: Recent surgery; Prior surgery; Surgery date: <1   month; Patient HX: C/O increase swelling and pain to right side of neck   following salivary gland removal. Was seen in er last week for swelling, placed   on antibiotic at that time, is taking it as prescribed, swelling has increased   and pain is markedly worse.     TECHNIQUE:   Imaging protocol: Computed tomography images of the neck with contrast.   Radiation optimization: All CT scans at this facility use at least one of these   dose optimization techniques: automated exposure control; mA and/or kV   adjustment per patient size (includes targeted exams where dose is matched to   clinical indication); or iterative reconstruction.   Contrast material: ISOVUE 370; Contrast volume: 100 ml; Contrast route:   INTRAVENOUS (IV);      COMPARISON:   CT SOFT TISSUE NECK W CONTRAST 11/14/2021 12:40 PM     FINDINGS:   Brain: The visualized brain is within normal limits. No significant mass effect   upon the airway.     Paranasal sinuses: Air-fluid level in the right maxillary sinus as before.   Nasopharynx: Unremarkable.   Oropharynx: Unremarkable. No significant tonsillar enlargement.   Hypopharynx: Unremarkable.   Larynx: Unremarkable. Normal epiglottis.   Retropharyngeal space: Unremarkable.    Submandibular/Parotid glands: Slight hypoenhancement of the right submandibular   gland in comparison to the left may indicate associated inflammatory changes   within the submandibular gland. No submandibular gland stone.   Thyroid: Normal. No enlarged or calcified nodules.    Lymph nodes: No confluent lymphadenopathy. Likely reactive nodes along both   sides the neck without confluent lymphadenopathy.     Trachea: Visualized trachea is unremarkable.   Lungs: The included lungs are grossly clear.   Bones/joints: No acute fracture.   Vasculature: No venous thrombus.   Soft tissues: Rim enhancing lobulated structure in the submandibular region on   the right, with surrounding fatty stranding and skin thickening, is appreciated   at 4.4 x 3.4 x 2.7 cm, enlarged relative to the most recent prior. A portion of   this extends into the skin of the submandibular region, better seen than on the   prior.       Impression    IMPRESSION:   Enlarging rim enhancing lesion in the right neck as described.  This could be   an infected granuloma or dustin abscess.  No mass effect upon the airway.  No   confluent lymphadenopathy or venous thrombus.  Consider fluid   sampling/drainage.  Possible secondary involvement of the right submandibular   gland.    THIS DOCUMENT HAS BEEN ELECTRONICALLY SIGNED BY MANUEL LUNA MD       Discharge Medications   Discharge Medication List as of 11/22/2021 10:57 AM      START taking these medications    Details   !! amoxicillin-clavulanate (AUGMENTIN) 875-125 MG tablet Take 1 tablet by mouth 2 times daily, Disp-14 tablet, R-0, E-Prescribe       !! - Potential duplicate medications found. Please discuss with provider.      CONTINUE these medications which have NOT CHANGED    Details   clindamycin-benzoyl peroxide (BENZACLIN) 1-5 % external gel Apply topically 2 times dailyDisp-50 g, [id number]      !! amoxicillin-clavulanate (AUGMENTIN) 875-125 MG tablet Take 1 tablet by mouth 2 times daily  for 10 days, Disp-20 tablet, R-0, E-Prescribe      ibuprofen (ADVIL/MOTRIN) 200 MG tablet Take 600 mg by mouth every 6 hours as needed for mild pain, Historical      Prenatal Vit-Fe Fumarate-FA (PRENATAL MULTIVITAMIN W/IRON) 27-0.8 MG tablet Take 1 tablet by mouth daily, Historical       !! - Potential duplicate medications found. Please discuss with provider.        Allergies   Allergies   Allergen Reactions     Adhesive Tape Other (See Comments)     Blistered skin

## 2021-11-22 NOTE — PROGRESS NOTES
SAFETY CHECKLIST  ID Bands and Risk clasps correct and in place (DNR, Fall risk, Allergy, Latex, Limb):  Yes  All Lines Reconciled and labeled correctly: Yes  Whiteboard updated:Yes  Environmental interventions (bed/chair alarm on, call light, side rails, restraints, sitter....): Yes  Sharmila Chávez RN on 11/22/2021 at 7:27 AM

## 2021-11-22 NOTE — PHARMACY - DISCHARGE MEDICATION RECONCILIATION AND EDUCATION
Pharmacy:  Discharge Counseling and Medication Reconciliation    Noemí Garcia  [address]  [facility] [address]  [phone] (home)   31 year old female  PCP: No Ref-Primary, Physician    Allergies: Adhesive tape    Discharge Counseling:    Pharmacist met with patient (and/or family) today to review the medication portion of the After Visit Summary (with an emphasis on NEW medications) and to address patient's questions/concerns.    Summary of Education: educated on the new prescription for augmentin. Educated to finish taking course from [facility] in addition to the new prescription at discharge for a total of 12 more days of therapy.     Materials Provided:  MedCounselor sheets printed from Clinical Pharmacology on: none- called patient    Discharge Medication Reconciliation:    It has been determined that the patient has an adequate supply of medications available or which can be obtained from the patient's preferred pharmacy, which HE/SHE has confirmed as: [facility].    Thank you for the consult.    Latoya Scanlon RP........November 22, 2021 11:22 AM

## 2021-11-22 NOTE — PROGRESS NOTES
Patient continues to be doing well, no issues with pain. Swelling to right face/neck patient states has gone down. Declined offers for heat/cold pack. S.O. at bedside. No further issues.   Vital signs:  Temp: 97.9  F (36.6  C) Temp src: Tympanic BP: 116/72 Pulse: 63   Resp: 18 SpO2: 99 % O2 Device: None (Room air)  Sharmila Chávez RN on 11/22/2021 at 10:28 AM  1

## 2021-11-23 ENCOUNTER — PATIENT OUTREACH (OUTPATIENT)
Dept: CARE COORDINATION | Facility: CLINIC | Age: 31
End: 2021-11-23
Payer: COMMERCIAL

## 2021-11-23 ENCOUNTER — OFFICE VISIT (OUTPATIENT)
Dept: OTOLARYNGOLOGY | Facility: OTHER | Age: 31
End: 2021-11-23
Attending: OTOLARYNGOLOGY
Payer: COMMERCIAL

## 2021-11-23 DIAGNOSIS — Z09 ENCOUNTER FOR FOLLOW-UP EXAMINATION AFTER COMPLETED TREATMENT FOR CONDITIONS OTHER THAN MALIGNANT NEOPLASM: Primary | ICD-10-CM

## 2021-11-23 PROCEDURE — G0463 HOSPITAL OUTPT CLINIC VISIT: HCPCS

## 2021-11-23 NOTE — PROGRESS NOTES
Transitional Care Management Phone Call    Patient seen for initial follow-up by ENT today.  Will return for surgical ENT follow-up again next week. No follow-up recommended with primary care, she asked that be cancelled. No TCM call required per policy.  Augustina Peña RN  11/23/2021 11:41 AM

## 2021-12-06 NOTE — PROGRESS NOTES
document embedded image  Patient Name: Noemí Garcia    Address: [address] [place] [address]    YOB: 1990    [place] [address]    MR Number: [id number]    Phone: [phone]  PCP: UNKNOWN            Appointment Date: 11/23/21   Visit Provider: Sammy Marquez MD    cc: UNKNOWN; ~    ENT Progress Note  Visit Reasons: Follow up    HPI  History of Present Illness  Chief complaint: Postop check    History  The patient is a 31-year-old female who underwent resection of a ranula through a right submandibular triangle approach several weeks ago.  She develops some postop fluid collection, this progressed.  She was seen by Dr. Mnotgomery over the weekend and had needle aspiration drainage of a large amount of pus from the wound.  She feels much better.  She remains somewhat tender but does not feel there is any fluid reaccumulation.    Exam  Neck-there is no erythema of the neck.  She still little tender in the right submandibular triangle.  There is minimal if any swelling at this time.  Oral cavity oropharynx-no swelling or edema of the floor of mouth.    Allergies    adhesive Adverse Reaction (Verified 11/22/21 09:09)  Blister  No Known Allergies Allergy (Mild, Uncoded 11/22/21 09:09)    PFSH  PFSH:     Medical History (Updated 11/22/21 @ 09:09 by Sanna Bella)    Cancer  Hearing loss  Kidney disease     Social History (System 11/22/21 @ 09:09 by Sanna Bella)  Smoking Status:  Current every day smoker  alcohol intake:  never       A&P  Assessment & Plan  (1) Postop check:        Status: Acute        Code(s):  Z09 - Encounter for follow-up examination after completed treatment for conditions other than malignant neoplasm  Additional Plan Details  Plan Details: She will see me in a week to check her progress.  She is welcome to call sooner if she feels fluid is reaccumulating.  She will continue antibiotics as prescribed.      Sammy Marquez MD    11/23/21 0802    <Electronically signed by Sammy LUCIO  Jimmy BUITRAGO> 11/24/21 2496

## 2021-12-14 ENCOUNTER — OFFICE VISIT (OUTPATIENT)
Dept: OTOLARYNGOLOGY | Facility: OTHER | Age: 31
End: 2021-12-14
Attending: OTOLARYNGOLOGY
Payer: COMMERCIAL

## 2021-12-14 DIAGNOSIS — Z09 POSTOP CHECK: Primary | ICD-10-CM

## 2021-12-14 PROCEDURE — G0463 HOSPITAL OUTPT CLINIC VISIT: HCPCS

## 2021-12-14 NOTE — NURSING NOTE
Patient here to see ENT for 2 week follow up on salivary gland surgery.   Arcelia Rosas LPN ..........12/14/2021 10:30 AM

## 2021-12-20 NOTE — PROGRESS NOTES
document embedded image  Patient Name: Noemí Garcia    Address: [address] [place] [address]    YOB: 1990    [place] [address]    MR Number: [id number]    Phone: [phone]  PCP: UNKNOWN            Appointment Date: 12/14/21   Visit Provider: Sammy Marquez MD    cc: UNKNOWN; ~    ENT Progress Note  Visit Reasons: 2 week follow up salivary gland surgery    HPI  History of Present Illness  Chief complaint:  Postop check    History  The patient is a 31-year-old female who underwent revision surgery on a ranula on the right through an external approach.  She did develop a wound infection with abscess formation.  This was aspirated via needle.  She has been maintained on broad-spectrum antibiotics.  Her cultures revealed multiple bacteria consistent with oral contamination.  She has finished her antibiotics and feels good at this time.     Exam  Neck-her surgical wound is healed well.  There is no palpable fluid on external palpation or bimanual exam.    Allergies    adhesive Adverse Reaction (Verified 11/22/21 09:09)  Blister  No Known Allergies Allergy (Mild, Uncoded 11/22/21 09:09)    PFSH  PFSH:     Medical History (Updated 12/15/21 @ 09:04 by Marilyn Andrew Med Assist)    Cancer  H/O nephrolithotomy with removal of calculi  Hearing loss  Kidney disease     Surgical History (Updated 12/15/21 @ 09:04 by Marilyn Andrew Med Assist)    H/O nasal septoplasty  Hx of cholecystectomy     Social History (Updated 12/15/21 @ 09:05 by Marilyn Andrew Med Assist)  Smoking Status:  Former smoker  second hand exposure:  No  alcohol intake:  never       A&P  Assessment & Plan  (1) Postop check:        Status: Acute        Code(s):  Z09 - Encounter for follow-up examination after completed treatment for conditions other than malignant neoplasm  The patient appears to be healing well without residual fluid at this time.  She will follow up if she has other concerns.      Sammy Marquez MD    12/13/21  1537    <Electronically signed by Sammy Marquez MD> 12/15/21 103

## 2022-03-12 ENCOUNTER — APPOINTMENT (OUTPATIENT)
Dept: CT IMAGING | Facility: OTHER | Age: 32
End: 2022-03-12
Attending: EMERGENCY MEDICINE
Payer: COMMERCIAL

## 2022-03-12 ENCOUNTER — HOSPITAL ENCOUNTER (EMERGENCY)
Facility: OTHER | Age: 32
Discharge: HOME | End: 2022-03-12
Attending: EMERGENCY MEDICINE | Admitting: EMERGENCY MEDICINE
Payer: COMMERCIAL

## 2022-03-12 VITALS
OXYGEN SATURATION: 99 % | SYSTOLIC BLOOD PRESSURE: 120 MMHG | DIASTOLIC BLOOD PRESSURE: 80 MMHG | RESPIRATION RATE: 16 BRPM | WEIGHT: 120 LBS | HEART RATE: 62 BPM | TEMPERATURE: 97.4 F | BODY MASS INDEX: 21.95 KG/M2

## 2022-03-12 DIAGNOSIS — N20.1 URETER, CALCULUS: ICD-10-CM

## 2022-03-12 PROBLEM — T43.201A OVERDOSE OF ANTIDEPRESSANT: Status: ACTIVE | Noted: 2017-06-14

## 2022-03-12 PROBLEM — T50.901A DRUG OVERDOSE: Status: ACTIVE | Noted: 2017-06-14

## 2022-03-12 PROBLEM — K80.50 BILIARY COLIC: Status: ACTIVE | Noted: 2017-04-25

## 2022-03-12 LAB
ALBUMIN SERPL-MCNC: 4.3 G/DL (ref 3.5–5.7)
ALBUMIN UR-MCNC: NEGATIVE MG/DL
ALP SERPL-CCNC: 45 U/L (ref 34–104)
ALT SERPL W P-5'-P-CCNC: 14 U/L (ref 7–52)
ANION GAP SERPL CALCULATED.3IONS-SCNC: 4 MMOL/L (ref 3–14)
APPEARANCE UR: CLEAR
AST SERPL W P-5'-P-CCNC: 14 U/L (ref 13–39)
BASOPHILS # BLD AUTO: 0 10E3/UL (ref 0–0.2)
BASOPHILS NFR BLD AUTO: 0 %
BILIRUB SERPL-MCNC: 0.5 MG/DL (ref 0.3–1)
BILIRUB UR QL STRIP: NEGATIVE
BUN SERPL-MCNC: 12 MG/DL (ref 7–25)
CALCIUM SERPL-MCNC: 9.3 MG/DL (ref 8.6–10.3)
CHLORIDE BLD-SCNC: 106 MMOL/L (ref 98–107)
CO2 SERPL-SCNC: 27 MMOL/L (ref 21–31)
COLOR UR AUTO: ABNORMAL
CREAT SERPL-MCNC: 0.84 MG/DL (ref 0.6–1.2)
EOSINOPHIL # BLD AUTO: 0.3 10E3/UL (ref 0–0.7)
EOSINOPHIL NFR BLD AUTO: 5 %
ERYTHROCYTE [DISTWIDTH] IN BLOOD BY AUTOMATED COUNT: 12.4 % (ref 10–15)
GFR SERPL CREATININE-BSD FRML MDRD: >90 ML/MIN/1.73M2
GLUCOSE BLD-MCNC: 88 MG/DL (ref 70–105)
GLUCOSE UR STRIP-MCNC: NEGATIVE MG/DL
HCG UR QL: NEGATIVE
HCT VFR BLD AUTO: 38.6 % (ref 35–47)
HGB BLD-MCNC: 12.8 G/DL (ref 11.7–15.7)
HGB UR QL STRIP: NEGATIVE
IMM GRANULOCYTES # BLD: 0 10E3/UL
IMM GRANULOCYTES NFR BLD: 0 %
KETONES UR STRIP-MCNC: NEGATIVE MG/DL
LEUKOCYTE ESTERASE UR QL STRIP: NEGATIVE
LYMPHOCYTES # BLD AUTO: 1.8 10E3/UL (ref 0.8–5.3)
LYMPHOCYTES NFR BLD AUTO: 32 %
MCH RBC QN AUTO: 28.3 PG (ref 26.5–33)
MCHC RBC AUTO-ENTMCNC: 33.2 G/DL (ref 31.5–36.5)
MCV RBC AUTO: 85 FL (ref 78–100)
MONOCYTES # BLD AUTO: 0.4 10E3/UL (ref 0–1.3)
MONOCYTES NFR BLD AUTO: 7 %
MUCOUS THREADS #/AREA URNS LPF: PRESENT /LPF
NEUTROPHILS # BLD AUTO: 3.3 10E3/UL (ref 1.6–8.3)
NEUTROPHILS NFR BLD AUTO: 56 %
NITRATE UR QL: NEGATIVE
NRBC # BLD AUTO: 0 10E3/UL
NRBC BLD AUTO-RTO: 0 /100
PH UR STRIP: 6 [PH] (ref 5–9)
PLATELET # BLD AUTO: 274 10E3/UL (ref 150–450)
POTASSIUM BLD-SCNC: 4 MMOL/L (ref 3.5–5.1)
PROT SERPL-MCNC: 6.4 G/DL (ref 6.4–8.9)
RBC # BLD AUTO: 4.53 10E6/UL (ref 3.8–5.2)
RBC URINE: 1 /HPF
SODIUM SERPL-SCNC: 137 MMOL/L (ref 134–144)
SP GR UR STRIP: 1.01 (ref 1–1.03)
UROBILINOGEN UR STRIP-MCNC: NORMAL MG/DL
WBC # BLD AUTO: 5.8 10E3/UL (ref 4–11)
WBC URINE: 1 /HPF

## 2022-03-12 PROCEDURE — 85025 COMPLETE CBC W/AUTO DIFF WBC: CPT | Performed by: EMERGENCY MEDICINE

## 2022-03-12 PROCEDURE — 99283 EMERGENCY DEPT VISIT LOW MDM: CPT | Performed by: EMERGENCY MEDICINE

## 2022-03-12 PROCEDURE — 80053 COMPREHEN METABOLIC PANEL: CPT | Performed by: EMERGENCY MEDICINE

## 2022-03-12 PROCEDURE — 250N000011 HC RX IP 250 OP 636: Performed by: EMERGENCY MEDICINE

## 2022-03-12 PROCEDURE — 81025 URINE PREGNANCY TEST: CPT | Performed by: EMERGENCY MEDICINE

## 2022-03-12 PROCEDURE — 99285 EMERGENCY DEPT VISIT HI MDM: CPT | Mod: 25 | Performed by: EMERGENCY MEDICINE

## 2022-03-12 PROCEDURE — 81001 URINALYSIS AUTO W/SCOPE: CPT | Performed by: EMERGENCY MEDICINE

## 2022-03-12 PROCEDURE — 96375 TX/PRO/DX INJ NEW DRUG ADDON: CPT | Performed by: EMERGENCY MEDICINE

## 2022-03-12 PROCEDURE — 36415 COLL VENOUS BLD VENIPUNCTURE: CPT | Performed by: EMERGENCY MEDICINE

## 2022-03-12 PROCEDURE — 74177 CT ABD & PELVIS W/CONTRAST: CPT

## 2022-03-12 PROCEDURE — 96374 THER/PROPH/DIAG INJ IV PUSH: CPT | Mod: XU | Performed by: EMERGENCY MEDICINE

## 2022-03-12 PROCEDURE — 258N000003 HC RX IP 258 OP 636: Performed by: EMERGENCY MEDICINE

## 2022-03-12 RX ORDER — ONDANSETRON 2 MG/ML
4 INJECTION INTRAMUSCULAR; INTRAVENOUS ONCE
Status: COMPLETED | OUTPATIENT
Start: 2022-03-12 | End: 2022-03-12

## 2022-03-12 RX ORDER — TAMSULOSIN HYDROCHLORIDE 0.4 MG/1
0.4 CAPSULE ORAL DAILY
Qty: 7 CAPSULE | Refills: 0 | Status: SHIPPED | OUTPATIENT
Start: 2022-03-12 | End: 2022-04-26

## 2022-03-12 RX ORDER — ONDANSETRON 4 MG/1
4 TABLET, ORALLY DISINTEGRATING ORAL EVERY 8 HOURS PRN
Qty: 5 TABLET | Refills: 0 | Status: SHIPPED | OUTPATIENT
Start: 2022-03-12 | End: 2023-07-12

## 2022-03-12 RX ORDER — IOPAMIDOL 755 MG/ML
100 INJECTION, SOLUTION INTRAVASCULAR ONCE
Status: COMPLETED | OUTPATIENT
Start: 2022-03-12 | End: 2022-03-12

## 2022-03-12 RX ORDER — HYDROCODONE BITARTRATE AND ACETAMINOPHEN 5; 325 MG/1; MG/1
1 TABLET ORAL EVERY 6 HOURS PRN
Qty: 6 TABLET | Refills: 0 | Status: ON HOLD | OUTPATIENT
Start: 2022-03-12 | End: 2022-03-15

## 2022-03-12 RX ORDER — SODIUM CHLORIDE 9 MG/ML
INJECTION, SOLUTION INTRAVENOUS CONTINUOUS
Status: DISCONTINUED | OUTPATIENT
Start: 2022-03-12 | End: 2022-03-12 | Stop reason: HOSPADM

## 2022-03-12 RX ORDER — KETOROLAC TROMETHAMINE 30 MG/ML
30 INJECTION, SOLUTION INTRAMUSCULAR; INTRAVENOUS ONCE
Status: COMPLETED | OUTPATIENT
Start: 2022-03-12 | End: 2022-03-12

## 2022-03-12 RX ADMIN — ONDANSETRON 4 MG: 2 INJECTION INTRAMUSCULAR; INTRAVENOUS at 17:39

## 2022-03-12 RX ADMIN — IOPAMIDOL 100 ML: 755 INJECTION, SOLUTION INTRAVENOUS at 17:52

## 2022-03-12 RX ADMIN — SODIUM CHLORIDE: 9 INJECTION, SOLUTION INTRAVENOUS at 17:36

## 2022-03-12 RX ADMIN — KETOROLAC TROMETHAMINE 30 MG: 30 INJECTION, SOLUTION INTRAMUSCULAR at 17:36

## 2022-03-12 ASSESSMENT — ENCOUNTER SYMPTOMS
CHEST TIGHTNESS: 0
BLOOD IN STOOL: 0
FLANK PAIN: 1
VOMITING: 0
DIARRHEA: 1
AGITATION: 0
SHORTNESS OF BREATH: 0
CHILLS: 0
ABDOMINAL PAIN: 1
ARTHRALGIAS: 0
DYSURIA: 0
FEVER: 0
NAUSEA: 1
LIGHT-HEADEDNESS: 0

## 2022-03-12 NOTE — PROGRESS NOTES
IV Contrast- Discharge Instructions After Your CT Scan      The IV contrast you received today will be filtered from your bloodstream by your kidneys during the next 24 hours and pass from the body in urine.  You will not be aware of this process and your urine will not change in color.  To help this process you should drink at least 4 additional glasses of water or juice today.  This reduces stress on your kidneys.    Most contrast reactions are immediate.  Should you develop symptoms of concern after discharge, contact the department at the number below.  After hours you should contact your personal physician.  If you develop breathing distress or wheezing, call 911.      1.  Has the patient had a previous reaction to IV contrast? no    2.  Does the patient have kidney disease? no    3.  Is the patient on dialysis? no    If YES to any of these questions, exam will be reviewed with a Radiologist before administering contrast.    1.  Is patient currently taking metformin? no     If NO: Technologist will give the patient normal post CT instructions.     If YES: Technologist will obtain GFR from Lab.    2.  Is GFR is greater than 60? no     If YES: Technologist will give the patient normal post CT instructions.     If NO: Technologist will give the patient METFORMIN post CT instructions.

## 2022-03-12 NOTE — PROGRESS NOTES
Removed any patient belongings during exam?   no  Where were belongings placed? n/a  Belongings were returned with patient to patient's room? n/a

## 2022-03-12 NOTE — ED PROVIDER NOTES
History     Chief Complaint   Patient presents with     Abdominal Pain     HPI  Noemí Garcia is a 31 year old female who is here with right sided abdominal pain.  Has been present for about a day and a half now.  Initially woke her from sleep in the middle of the night.  Right lower quadrant with no radiation initially, however now it has started to radiate into her back and flank area.  She did have a low-grade fever last night but this has resolved.  It is worse when she tries to stand straight.  Has been urinating normally.  Little bit of diarrhea but not black or bloody.  History of kidney stones and this feels somewhat similar.    Allergies:  Allergies   Allergen Reactions     Adhesive Tape Other (See Comments)     Blistered skin       Problem List:    Patient Active Problem List    Diagnosis Date Noted     Submandibular abscess 11/20/2021     Priority: Medium     Postpartum endometritis 07/30/2021     Priority: Medium     History of lymphoma 07/28/2021     Priority: Medium     ASCUS with positive high risk HPV cervical 07/28/2021     Priority: Medium     Hodgkin's disease of lymph nodes of multiple sites (H) 11/12/2019     Priority: Medium     Bipolar II disorder, moderate, depressed, with anxious distress (H) 10/05/2018     Priority: Medium     Drug overdose 06/14/2017     Priority: Medium     Overdose of antidepressant 06/14/2017     Priority: Medium     Biliary colic 04/25/2017     Priority: Medium     Deviated nasal septum 03/10/2016     Priority: Medium     Generalized anxiety disorder 03/02/2016     Priority: Medium     Kidney stones 06/16/2015     Priority: Medium     Renal colic 05/23/2015     Priority: Medium     Anal fissure 07/10/2013     Priority: Medium        Past Medical History:    Past Medical History:   Diagnosis Date     Abnormal Pap smear of cervix      Cancer (H)      Cerebral infarction (H)      Depressive disorder        Past Surgical History:    Past Surgical History:   Procedure  Laterality Date     AS REMOVE GROIN LYMPH NODES       BONE MARROW BIOPSY       COLONOSCOPY       COLPOSCOPY       EXTRACORPOREAL SHOCK WAVE LITHOTRIPSY, CYSTOSCOPY, INSERT STENT URETER(S), COMBINED       GALLBLADDER SURGERY       HEAD & NECK SURGERY      cyst removal     KIDNEY STONE SURGERY       RHINOPLASTY         Family History:    Family History   Problem Relation Age of Onset     No Known Problems Mother      Heart Disease Father         MIs     No Known Problems Sister      No Known Problems Sister      No Known Problems Brother      No Known Problems Brother      No Known Problems Brother      Alzheimer Disease Maternal Grandmother      Heart Disease Maternal Grandfather         MI or stroke?     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   HENT: Negative for congestion.    Eyes: Negative for visual disturbance.   Respiratory: Negative for chest tightness and shortness of breath.    Cardiovascular: Negative for chest pain.   Gastrointestinal: Positive for abdominal pain, diarrhea and nausea.  "Negative for blood in stool and vomiting.   Genitourinary: Positive for flank pain. Negative for dysuria.   Musculoskeletal: Negative for arthralgias.   Skin: Negative for rash.   Neurological: Negative for light-headedness.   Psychiatric/Behavioral: Negative for agitation.       Physical Exam   BP: 120/80  Pulse: 62  Temp: 97.4  F (36.3  C)  Resp: 16  Weight: 54.4 kg (120 lb)  SpO2: 99 %      Physical Exam  Vitals and nursing note reviewed.   Constitutional:       Appearance: She is well-developed.   HENT:      Head: Normocephalic and atraumatic.      Mouth/Throat:      Mouth: Mucous membranes are moist.   Eyes:      Conjunctiva/sclera: Conjunctivae normal.   Cardiovascular:      Rate and Rhythm: Normal rate and regular rhythm.      Heart sounds: Normal heart sounds.   Pulmonary:      Effort: Pulmonary effort is normal.      Breath sounds: Normal breath sounds.   Abdominal:      General: Abdomen is flat. Bowel sounds are normal. There is no distension.      Palpations: Abdomen is soft.      Comments: Pain is worse mid to upper right abdomen.  Negative McBurney's.  Mild rebound she did say that it hurt worse on the left Javier than when I pushed, however she said it was not \"horrible\".,    Skin:     General: Skin is warm and dry.   Neurological:      Mental Status: She is alert and oriented to person, place, and time.   Psychiatric:         Mood and Affect: Mood normal.         Behavior: Behavior normal.         ED Course                 Procedures                  Results for orders placed or performed during the hospital encounter of 03/12/22 (from the past 24 hour(s))   UA with Microscopic reflex to Culture    Specimen: Urine, Clean Catch   Result Value Ref Range    Color Urine Light Yellow Colorless, Straw, Light Yellow, Yellow    Appearance Urine Clear Clear    Glucose Urine Negative Negative mg/dL    Bilirubin Urine Negative Negative    Ketones Urine Negative Negative mg/dL    Specific Gravity Urine 1.010 1.000 " - 1.030    Blood Urine Negative Negative    pH Urine 6.0 5.0 - 9.0    Protein Albumin Urine Negative Negative mg/dL    Urobilinogen Urine Normal Normal, 2.0 mg/dL    Nitrite Urine Negative Negative    Leukocyte Esterase Urine Negative Negative    Mucus Urine Present (A) None Seen /LPF    RBC Urine 1 <=2 /HPF    WBC Urine 1 <=5 /HPF    Narrative    Urine Culture not indicated   HCG qualitative urine (UPT)   Result Value Ref Range    hCG Urine Qualitative Negative Negative   CBC with platelets differential    Narrative    The following orders were created for panel order CBC with platelets differential.  Procedure                               Abnormality         Status                     ---------                               -----------         ------                     CBC with platelets and d...[[id number]]                      Final result                 Please view results for these tests on the individual orders.   Comprehensive metabolic panel   Result Value Ref Range    Sodium 137 134 - 144 mmol/L    Potassium 4.0 3.5 - 5.1 mmol/L    Chloride 106 98 - 107 mmol/L    Carbon Dioxide (CO2) 27 21 - 31 mmol/L    Anion Gap 4 3 - 14 mmol/L    Urea Nitrogen 12 7 - 25 mg/dL    Creatinine 0.84 0.60 - 1.20 mg/dL    Calcium 9.3 8.6 - 10.3 mg/dL    Glucose 88 70 - 105 mg/dL    Alkaline Phosphatase 45 34 - 104 U/L    AST 14 13 - 39 U/L    ALT 14 7 - 52 U/L    Protein Total 6.4 6.4 - 8.9 g/dL    Albumin 4.3 3.5 - 5.7 g/dL    Bilirubin Total 0.5 0.3 - 1.0 mg/dL    GFR Estimate >90 >60 mL/min/1.73m2   CBC with platelets and differential   Result Value Ref Range    WBC Count 5.8 4.0 - 11.0 10e3/uL    RBC Count 4.53 3.80 - 5.20 10e6/uL    Hemoglobin 12.8 11.7 - 15.7 g/dL    Hematocrit 38.6 35.0 - 47.0 %    MCV 85 78 - 100 fL    MCH 28.3 26.5 - 33.0 pg    MCHC 33.2 31.5 - 36.5 g/dL    RDW 12.4 10.0 - 15.0 %    Platelet Count 274 150 - 450 10e3/uL    % Neutrophils 56 %    % Lymphocytes 32 %    % Monocytes 7 %    % Eosinophils 5  %    % Basophils 0 %    % Immature Granulocytes 0 %    NRBCs per 100 WBC 0 <1 /100    Absolute Neutrophils 3.3 1.6 - 8.3 10e3/uL    Absolute Lymphocytes 1.8 0.8 - 5.3 10e3/uL    Absolute Monocytes 0.4 0.0 - 1.3 10e3/uL    Absolute Eosinophils 0.3 0.0 - 0.7 10e3/uL    Absolute Basophils 0.0 0.0 - 0.2 10e3/uL    Absolute Immature Granulocytes 0.0 <=0.4 10e3/uL    Absolute NRBCs 0.0 10e3/uL   CT Abdomen Pelvis w Contrast    Narrative    PROCEDURE INFORMATION:   Exam: CT Abdomen And Pelvis With Contrast   Exam date and time: 3/12/2022 5:45 PM   Age: 31 years old   Clinical indication: Abdominal pain; Acute; Additional info: No prior imaging,   only imaging available ob u/s     TECHNIQUE:   Imaging protocol: Computed tomography of the abdomen and pelvis with contrast.   Radiation optimization: All CT scans at this facility use at least one of these   dose optimization techniques: automated exposure control; mA and/or kV   adjustment per patient size (includes targeted exams where dose is matched to   clinical indication); or iterative reconstruction.   Contrast material: ISOVUE 370; Contrast volume: 100 ml; Contrast route:   INTRAVENOUS (IV);      COMPARISON:   No relevant prior studies available.     FINDINGS:   Tubes, catheters and devices: Unremarkable.      Mediastinal space: There is distal esophageal wall thickening, consistent with   mild esophagitis.     Liver: Unremarkable. No mass. No intra or extrahepatic biliary dialtion.   Gallbladder and bile ducts: There has been a cholecystectomy. Mild intra and   extrahepatic biliary ductal dilatation. CBD at the pancreatic head is 4.2 mm.   Pancreas: Unremarkable. No ductal dilation.   Spleen: Unremarkable. No splenomegaly.   Adrenal glands: Normal. No mass.   Kidneys and ureters: Small nonobstructing left anterior renal calyceal stone.   Small subcentimeter left renal cortical cyst. Double left renal collecting   system noted with without ureteral obstruction. Small  nonobstructing anterior   right upper pole renal calyceal stone inferior right anterior nonobstructive   stone. Moderate right hydronephrosis dilated right ureter dilated right renal   pelvis massive dilatation of the mid right ureter noted measures 18 mm with a   prominent 5 x 4 mm stone distal right ureter just above the UV junction   creating severe hydronephrosis urology consultation follow-up recommended.   Stomach and bowel: Moderate retained content in the stomach.   Appendix: No evidence of appendicitis.     Intraperitoneal space: Unremarkable. No free air. No significant fluid   collection.   Vasculature: No abdominal aortic aneurysm.   Lymph nodes: Unremarkable. No enlarged lymph nodes.   Urinary bladder: Unremarkable as visualized.   Reproductive: Moderate uterine enlargement. Minimal fluid in the pelvis normal   bilateral ovarian adnexal tissue.   Bones/joints: Unremarkable. No acute fracture.   Soft tissues: Unremarkable.       Impression    IMPRESSION:   1. Severe right hydroureteronephrosis and a 5 x 4 mm distal right ureteral   stone just above the right UV junction consider urology follow-up.   2. The bilateral nephrolithiasis.     COMMENTS:   Consistent with the American College of Radiology's Incidental Findings   Committee white paper (J Am Ihsan Radiol 2018): Any incidental renal lesion less   than 1 cm or classified as too small to characterize, or any incidental cystic   renal lesion characterized as simple-appearing, is likely benign. No follow-up   imaging is recommended for these lesions per consensus recommendations based on   imaging criteria.     THIS DOCUMENT HAS BEEN ELECTRONICALLY SIGNED BY FRANCISCA BARRIENTOS MD       Medications   sodium chloride 0.9% infusion ( Intravenous New Bag 3/12/22 1736)   ketorolac (TORADOL) injection 30 mg (30 mg Intravenous Given 3/12/22 1736)   ondansetron (ZOFRAN) injection 4 mg (4 mg Intravenous Given 3/12/22 1739)   iopamidol (ISOVUE-370) solution 100 mL  (100 mLs Intravenous Given 3/12/22 3322)       Assessments & Plan (with Medical Decision Making)     I have reviewed the nursing notes.    I have reviewed the findings, diagnosis, plan and need for follow up with the patient.  Patient with distal ureteral calculus as above in CT scan.  Patient has a history of these, she states she usually cannot pass them on her own, however at 5 mm it is possible this could pass.  She does not have a fever.  Pain is tolerable and controlled at this time.  No sign of infection.  Currently weekend and there is no one from urology available at this time.  I will send him home with prescription for Flomax, hydrocodone, Zofran.  Have left a message with urology office and they will call her Monday morning.  Return in the meantime if worse.    New Prescriptions    HYDROCODONE-ACETAMINOPHEN (NORCO) 5-325 MG TABLET    Take 1 tablet by mouth every 6 hours as needed for severe pain    ONDANSETRON (ZOFRAN-ODT) 4 MG ODT TAB    Take 1 tablet (4 mg) by mouth every 8 hours as needed for nausea    TAMSULOSIN (FLOMAX) 0.4 MG CAPSULE    Take 1 capsule (0.4 mg) by mouth daily       Final diagnoses:   Ureter, calculus       3/12/2022   [facility]     David Mancini MD  03/12/22 3079

## 2022-03-12 NOTE — ED TRIAGE NOTES
ED Nursing Triage Note (General)   ________________________________    Noemí Garcia is a 31 year old Female that presents to triage private car  With history of  Pt states that she thinks she has a kidney stone.  Pt has had pain in her RLQ for 1-1/2 days now.  Pt has been nauseated and states she has had them before reported by patient   Significant symptoms had onset 1-1/2 day(s) ago.    Patient appears alert , in mild distress., and cooperative behavior.    GCS Eye Opening = 4=Spontaneous  Airway: intact  Breathing noted as Normal  Circulation Normal  Skin:  Normal  Action taken:  Triage to critical care immediately

## 2022-03-13 NOTE — DISCHARGE INSTRUCTIONS
You should get a phone call from urology on Monday morning, however if you do not hear from them by midday I will give him a call.  You can always return here over the course of the weekend if you are feeling worse.

## 2022-03-14 ENCOUNTER — ANESTHESIA EVENT (OUTPATIENT)
Dept: SURGERY | Facility: OTHER | Age: 32
End: 2022-03-14
Payer: COMMERCIAL

## 2022-03-14 ENCOUNTER — OFFICE VISIT (OUTPATIENT)
Dept: UROLOGY | Facility: OTHER | Age: 32
End: 2022-03-14
Attending: UROLOGY
Payer: COMMERCIAL

## 2022-03-14 VITALS
SYSTOLIC BLOOD PRESSURE: 108 MMHG | WEIGHT: 122.2 LBS | DIASTOLIC BLOOD PRESSURE: 70 MMHG | HEART RATE: 73 BPM | BODY MASS INDEX: 22.35 KG/M2 | RESPIRATION RATE: 16 BRPM | OXYGEN SATURATION: 97 %

## 2022-03-14 DIAGNOSIS — N20.1 RIGHT URETERAL STONE: Primary | ICD-10-CM

## 2022-03-14 LAB — SARS-COV-2 RNA RESP QL NAA+PROBE: NEGATIVE

## 2022-03-14 PROCEDURE — U0005 INFEC AGEN DETEC AMPLI PROBE: HCPCS | Mod: ZL | Performed by: UROLOGY

## 2022-03-14 PROCEDURE — 99204 OFFICE O/P NEW MOD 45 MIN: CPT | Performed by: UROLOGY

## 2022-03-14 PROCEDURE — C9803 HOPD COVID-19 SPEC COLLECT: HCPCS | Performed by: UROLOGY

## 2022-03-14 PROCEDURE — G0463 HOSPITAL OUTPT CLINIC VISIT: HCPCS

## 2022-03-14 RX ORDER — CEFTRIAXONE SODIUM 2 G/50ML
2 INJECTION, SOLUTION INTRAVENOUS
Status: CANCELLED | OUTPATIENT
Start: 2022-03-14

## 2022-03-14 ASSESSMENT — PAIN SCALES - GENERAL: PAINLEVEL: SEVERE PAIN (6)

## 2022-03-14 NOTE — NURSING NOTE
Chief Complaint   Patient presents with     Consult     right kidney stone      Patient presents to the clinic today for a Consult for right kidney stone     Review of Systems:    Weight loss:    No     Recent fever/chills:  No   Night sweats:   No  Current skin rash:  No   Recent hair loss:  No  Heat intolerance:  No   Cold intolerance:  No  Chest pain:   No   Palpitations:   No  Shortness of breath:  No   Wheezing:   No  Constipation:    No   Diarrhea:   No   Nausea:   Yes   Vomiting:   No   Kidney/side pain:  Yes   Back pain:   No  Frequent headaches:  No   Dizziness:     No  Leg swelling:   No   Calf pain:    No    Parents, brothers or sisters with history of kidney cancer:   No  Parents, brothers or sisters with history of bladder cancer: No       Medication Reconciliation: completed   Cha Zhang LPN  3/14/2022 1:46 PM

## 2022-03-14 NOTE — PROGRESS NOTES
Type of Visit  NPV    Chief Complaint  Ureteral stone    HPI  Ms. Garcia is a 31 year old female who presents with right ureteral stone.  The patient initially presented to the ED 2 days ago.  At that time the patient underwent a CT scan which revealed a 5 mm obstructing stone.  The patient currently denies fevers or chills.  The patient currently denies nausea or vomiting.  She has undergone surgery in the past for stones:  10 years ago    Pain ROS  Location:  Right flank  Quality:    Sharp and Dull  Provocative factors:  Nothing makes it worse  Palliative factors:   Nothing makes it better  Radiation:   None  Severity:   6-7/10 currently  Time:     Pain started 5 days ago      Past Medical History  She  has a past medical history of Abnormal Pap smear of cervix, Cancer (H), Cerebral infarction (H), and Depressive disorder.    She has no past medical history of Arthritis, Congestive heart failure (H), COPD (chronic obstructive pulmonary disease) (H), Diabetes (H), Heart disease, History of blood transfusion, Hypertension, Thyroid disease, or Uncomplicated asthma.  Patient Active Problem List   Diagnosis     Hodgkin's disease of lymph nodes of multiple sites (H)     Anal fissure     Bipolar II disorder, moderate, depressed, with anxious distress (H)     Deviated nasal septum     Generalized anxiety disorder     Kidney stones     Renal colic     History of lymphoma     ASCUS with positive high risk HPV cervical     Postpartum endometritis     Submandibular abscess     Biliary colic     Drug overdose     Overdose of antidepressant     Past Surgical History  She  has a past surgical history that includes colposcopy; colonoscopy; Head and neck surgery; Gallbladder surgery; Kidney Stone Surgery; Extracorporeal shock wave lithotripsy, cystoscopy, insert stent ureter(s), combined; REMOVE GROIN LYMPH NODES; Rhinoplasty; and bone marrow biopsy.    Medications  She has a current medication list which includes the following  prescription(s): amoxicillin-clavulanate, clindamycin-benzoyl peroxide, hydrocodone-acetaminophen, ibuprofen, ondansetron, prenatal multivitamin w/iron, and tamsulosin.    Allergies  Allergies   Allergen Reactions     Adhesive Tape Other (See Comments)     Blistered skin     Social History  She  reports that she has quit smoking. Her smoking use included vaping device. She has never used smokeless tobacco. She reports previous alcohol use. She reports previous drug use. Drug: Methamphetamines.  No drug abuse.    Family History  Family History   Problem Relation Age of Onset     No Known Problems Mother      Heart Disease Father         MIs     No Known Problems Sister      No Known Problems Sister      No Known Problems Brother      No Known Problems Brother      No Known Problems Brother      Alzheimer Disease Maternal Grandmother      Heart Disease Maternal Grandfather         MI or stroke?     Heart Disease Paternal Grandmother      Review of Systems  I personally reviewed the ROS with the patient.    Nursing Notes:   Cha Zhang LPN  3/14/2022  1:52 PM  Addendum  Chief Complaint   Patient presents with     Consult     right kidney stone      Patient presents to the clinic today for a Consult for right kidney stone     Review of Systems:    Weight loss:    No     Recent fever/chills:  No   Night sweats:   No  Current skin rash:  No   Recent hair loss:  No  Heat intolerance:  No   Cold intolerance:  No  Chest pain:   No   Palpitations:   No  Shortness of breath:  No   Wheezing:   No  Constipation:    No   Diarrhea:   No   Nausea:   Yes   Vomiting:   No   Kidney/side pain:  Yes   Back pain:   No  Frequent headaches:  No   Dizziness:     No  Leg swelling:   No   Calf pain:    No    Parents, brothers or sisters with history of kidney cancer:   No  Parents, brothers or sisters with history of bladder cancer: No       Medication Reconciliation: completed   Cha Zhang LPN  3/14/2022 1:46 PM     Physical  Exam  Vitals:    03/14/22 1349   BP: 108/70   BP Location: Right arm   Patient Position: Sitting   Cuff Size: Adult Regular   Pulse: 73   Resp: 16   SpO2: 97%   Weight: 55.4 kg (122 lb 3.2 oz)   Constitutional: NAD, WDWN  Head: NCAT  Eyes: Conjunctivae normal  Cardiovascular: Regular rate  Pulmonary/Chest: Respirations are even and non-labored bilaterally  Abdominal: Soft with no distension, tenderness, masses, guarding.    - left CVA tenderness    + right CVA tenderness  Extremities: SKYLAR x 4, warm, no clubbing, no cyanosis  Skin: Pink, warm, dry with no rash  Psychiatric:  Normal mood and affect  Genitourinary: Nonpalpable bladder    Labs  Results for KELLIE DONOHUE (MRN [id number]) as of 3/14/2022 13:43   3/12/2022 16:52 3/12/2022 17:27   Sodium  137   Potassium  4.0   Chloride  106   Carbon Dioxide  27   Urea Nitrogen  12   Creatinine  0.84   GFR Estimate  >90   Calcium  9.3   Anion Gap  4   Albumin  4.3   Protein Total  6.4   Bilirubin Total  0.5   Alkaline Phosphatase  45   ALT  14   AST  14   HCG Qual Urine Negative    Glucose  88   WBC  5.8   Hemoglobin  12.8   Hematocrit  38.6   Platelet Count  274   RBC Count  4.53   MCV  85   MCH  28.3   MCHC  33.2   RDW  12.4   % Neutrophils  56   % Lymphocytes  32   % Monocytes  7   % Eosinophils  5   % Basophils  0   Absolute Basophils  0.0   Absolute Eosinophils  0.3   Absolute Immature Granulocytes  0.0   Absolute Lymphocytes  1.8   Absolute Monocytes  0.4   % Immature Granulocytes  0   Absolute Neutrophils  3.3   Absolute NRBCs  0.0   NRBCs per 100 WBC  0   Color Urine Light Yellow    Appearance Urine Clear    Glucose Urine Negative    Bilirubin Urine Negative    Ketones Urine Negative    Specific Gravity Urine 1.010    pH Urine 6.0    Protein Albumin Urine Negative    Urobilinogen mg/dL Normal    Nitrite Urine Negative    Blood Urine Negative    Leukocyte Esterase Urine Negative    WBC Urine 1    RBC Urine 1    Mucus Urine Present (A)      Imaging  I personally  "reviewed and interpreted the images and report.  CT a/p   3/12/2022  IMPRESSION:   1. Severe right hydroureteronephrosis and a 5 x 4 mm distal right ureteral   stone just above the right UV junction consider urology follow-up.   2. The bilateral nephrolithiasis.     Assessment  Ms. Garcia is a 31 year old female who presents with right ureteral stone.    Discussed the treatment options for the ureteral stone including trial of passage and ureteroscopy with laser lithotripsy.    After explaining the risks, benefits, and alternatives a decision was made to proceed with ureteroscopy/laser lithotripsy.    The patient was explained the specific risks of bleeding, pain, infection, and ureteral injury.    In addition, the patient was told a stent may need to be placed which could result in urinary frequency, urgency, dysuria, and flank pain with voiding.    It would require an office based procedure to remove it at a later date.    Finally, the patient was told if the stone was very impacted, that we would place a stent and return at a later date to treat the stone.      Plan  The patient was scheduled and consented for \"right ureteroscopy with holmium laser lithotripsy and stent placement\" in the OR (LMA general anesthesia).  "

## 2022-03-14 NOTE — H&P (VIEW-ONLY)
Type of Visit  NPV    Chief Complaint  Ureteral stone    HPI  Ms. Garcia is a 31 year old female who presents with right ureteral stone.  The patient initially presented to the ED 2 days ago.  At that time the patient underwent a CT scan which revealed a 5 mm obstructing stone.  The patient currently denies fevers or chills.  The patient currently denies nausea or vomiting.  She has undergone surgery in the past for stones:  10 years ago    Pain ROS  Location:  Right flank  Quality:    Sharp and Dull  Provocative factors:  Nothing makes it worse  Palliative factors:   Nothing makes it better  Radiation:   None  Severity:   6-7/10 currently  Time:     Pain started 5 days ago      Past Medical History  She  has a past medical history of Abnormal Pap smear of cervix, Cancer (H), Cerebral infarction (H), and Depressive disorder.    She has no past medical history of Arthritis, Congestive heart failure (H), COPD (chronic obstructive pulmonary disease) (H), Diabetes (H), Heart disease, History of blood transfusion, Hypertension, Thyroid disease, or Uncomplicated asthma.  Patient Active Problem List   Diagnosis     Hodgkin's disease of lymph nodes of multiple sites (H)     Anal fissure     Bipolar II disorder, moderate, depressed, with anxious distress (H)     Deviated nasal septum     Generalized anxiety disorder     Kidney stones     Renal colic     History of lymphoma     ASCUS with positive high risk HPV cervical     Postpartum endometritis     Submandibular abscess     Biliary colic     Drug overdose     Overdose of antidepressant     Past Surgical History  She  has a past surgical history that includes colposcopy; colonoscopy; Head and neck surgery; Gallbladder surgery; Kidney Stone Surgery; Extracorporeal shock wave lithotripsy, cystoscopy, insert stent ureter(s), combined; REMOVE GROIN LYMPH NODES; Rhinoplasty; and bone marrow biopsy.    Medications  She has a current medication list which includes the following  prescription(s): amoxicillin-clavulanate, clindamycin-benzoyl peroxide, hydrocodone-acetaminophen, ibuprofen, ondansetron, prenatal multivitamin w/iron, and tamsulosin.    Allergies  Allergies   Allergen Reactions     Adhesive Tape Other (See Comments)     Blistered skin     Social History  She  reports that she has quit smoking. Her smoking use included vaping device. She has never used smokeless tobacco. She reports previous alcohol use. She reports previous drug use. Drug: Methamphetamines.  No drug abuse.    Family History  Family History   Problem Relation Age of Onset     No Known Problems Mother      Heart Disease Father         MIs     No Known Problems Sister      No Known Problems Sister      No Known Problems Brother      No Known Problems Brother      No Known Problems Brother      Alzheimer Disease Maternal Grandmother      Heart Disease Maternal Grandfather         MI or stroke?     Heart Disease Paternal Grandmother      Review of Systems  I personally reviewed the ROS with the patient.    Nursing Notes:   Cha Zhang LPN  3/14/2022  1:52 PM  Addendum  Chief Complaint   Patient presents with     Consult     right kidney stone      Patient presents to the clinic today for a Consult for right kidney stone     Review of Systems:    Weight loss:    No     Recent fever/chills:  No   Night sweats:   No  Current skin rash:  No   Recent hair loss:  No  Heat intolerance:  No   Cold intolerance:  No  Chest pain:   No   Palpitations:   No  Shortness of breath:  No   Wheezing:   No  Constipation:    No   Diarrhea:   No   Nausea:   Yes   Vomiting:   No   Kidney/side pain:  Yes   Back pain:   No  Frequent headaches:  No   Dizziness:     No  Leg swelling:   No   Calf pain:    No    Parents, brothers or sisters with history of kidney cancer:   No  Parents, brothers or sisters with history of bladder cancer: No       Medication Reconciliation: completed   Cha Zhang LPN  3/14/2022 1:46 PM     Physical  Exam  Vitals:    03/14/22 1349   BP: 108/70   BP Location: Right arm   Patient Position: Sitting   Cuff Size: Adult Regular   Pulse: 73   Resp: 16   SpO2: 97%   Weight: 55.4 kg (122 lb 3.2 oz)   Constitutional: NAD, WDWN  Head: NCAT  Eyes: Conjunctivae normal  Cardiovascular: Regular rate  Pulmonary/Chest: Respirations are even and non-labored bilaterally  Abdominal: Soft with no distension, tenderness, masses, guarding.    - left CVA tenderness    + right CVA tenderness  Extremities: SKYLAR x 4, warm, no clubbing, no cyanosis  Skin: Pink, warm, dry with no rash  Psychiatric:  Normal mood and affect  Genitourinary: Nonpalpable bladder    Labs  Results for KELLIE DONOHUE (MRN [id number]) as of 3/14/2022 13:43   3/12/2022 16:52 3/12/2022 17:27   Sodium  137   Potassium  4.0   Chloride  106   Carbon Dioxide  27   Urea Nitrogen  12   Creatinine  0.84   GFR Estimate  >90   Calcium  9.3   Anion Gap  4   Albumin  4.3   Protein Total  6.4   Bilirubin Total  0.5   Alkaline Phosphatase  45   ALT  14   AST  14   HCG Qual Urine Negative    Glucose  88   WBC  5.8   Hemoglobin  12.8   Hematocrit  38.6   Platelet Count  274   RBC Count  4.53   MCV  85   MCH  28.3   MCHC  33.2   RDW  12.4   % Neutrophils  56   % Lymphocytes  32   % Monocytes  7   % Eosinophils  5   % Basophils  0   Absolute Basophils  0.0   Absolute Eosinophils  0.3   Absolute Immature Granulocytes  0.0   Absolute Lymphocytes  1.8   Absolute Monocytes  0.4   % Immature Granulocytes  0   Absolute Neutrophils  3.3   Absolute NRBCs  0.0   NRBCs per 100 WBC  0   Color Urine Light Yellow    Appearance Urine Clear    Glucose Urine Negative    Bilirubin Urine Negative    Ketones Urine Negative    Specific Gravity Urine 1.010    pH Urine 6.0    Protein Albumin Urine Negative    Urobilinogen mg/dL Normal    Nitrite Urine Negative    Blood Urine Negative    Leukocyte Esterase Urine Negative    WBC Urine 1    RBC Urine 1    Mucus Urine Present (A)      Imaging  I personally  "reviewed and interpreted the images and report.  CT a/p   3/12/2022  IMPRESSION:   1. Severe right hydroureteronephrosis and a 5 x 4 mm distal right ureteral   stone just above the right UV junction consider urology follow-up.   2. The bilateral nephrolithiasis.     Assessment  Ms. Garcia is a 31 year old female who presents with right ureteral stone.    Discussed the treatment options for the ureteral stone including trial of passage and ureteroscopy with laser lithotripsy.    After explaining the risks, benefits, and alternatives a decision was made to proceed with ureteroscopy/laser lithotripsy.    The patient was explained the specific risks of bleeding, pain, infection, and ureteral injury.    In addition, the patient was told a stent may need to be placed which could result in urinary frequency, urgency, dysuria, and flank pain with voiding.    It would require an office based procedure to remove it at a later date.    Finally, the patient was told if the stone was very impacted, that we would place a stent and return at a later date to treat the stone.      Plan  The patient was scheduled and consented for \"right ureteroscopy with holmium laser lithotripsy and stent placement\" in the OR (LMA general anesthesia).  "

## 2022-03-15 ENCOUNTER — ANESTHESIA (OUTPATIENT)
Dept: SURGERY | Facility: OTHER | Age: 32
End: 2022-03-15
Payer: COMMERCIAL

## 2022-03-15 ENCOUNTER — HOSPITAL ENCOUNTER (OUTPATIENT)
Dept: GENERAL RADIOLOGY | Facility: OTHER | Age: 32
Discharge: HOME | End: 2022-03-15
Attending: UROLOGY
Payer: COMMERCIAL

## 2022-03-15 ENCOUNTER — HOSPITAL ENCOUNTER (OUTPATIENT)
Facility: OTHER | Age: 32
Discharge: HOME | End: 2022-03-15
Attending: UROLOGY | Admitting: UROLOGY
Payer: COMMERCIAL

## 2022-03-15 VITALS
BODY MASS INDEX: 22.45 KG/M2 | DIASTOLIC BLOOD PRESSURE: 65 MMHG | RESPIRATION RATE: 62 BRPM | TEMPERATURE: 97.8 F | HEART RATE: 50 BPM | HEIGHT: 62 IN | WEIGHT: 122 LBS | OXYGEN SATURATION: 98 % | SYSTOLIC BLOOD PRESSURE: 94 MMHG

## 2022-03-15 DIAGNOSIS — Z96.0 URETERAL STENT RETAINED: ICD-10-CM

## 2022-03-15 DIAGNOSIS — N20.0 KIDNEY STONES: Primary | ICD-10-CM

## 2022-03-15 LAB — HCG UR QL: NEGATIVE

## 2022-03-15 PROCEDURE — C1894 INTRO/SHEATH, NON-LASER: HCPCS | Performed by: UROLOGY

## 2022-03-15 PROCEDURE — 250N000009 HC RX 250: Performed by: NURSE ANESTHETIST, CERTIFIED REGISTERED

## 2022-03-15 PROCEDURE — 272N000001 HC OR GENERAL SUPPLY STERILE: Performed by: UROLOGY

## 2022-03-15 PROCEDURE — C1769 GUIDE WIRE: HCPCS | Performed by: UROLOGY

## 2022-03-15 PROCEDURE — 250N000011 HC RX IP 250 OP 636: Performed by: NURSE ANESTHETIST, CERTIFIED REGISTERED

## 2022-03-15 PROCEDURE — 370N000017 HC ANESTHESIA TECHNICAL FEE, PER MIN: Performed by: UROLOGY

## 2022-03-15 PROCEDURE — C2617 STENT, NON-COR, TEM W/O DEL: HCPCS | Performed by: UROLOGY

## 2022-03-15 PROCEDURE — 250N000011 HC RX IP 250 OP 636: Performed by: UROLOGY

## 2022-03-15 PROCEDURE — 258N000003 HC RX IP 258 OP 636: Performed by: NURSE ANESTHETIST, CERTIFIED REGISTERED

## 2022-03-15 PROCEDURE — 250N000013 HC RX MED GY IP 250 OP 250 PS 637: Performed by: NURSE ANESTHETIST, CERTIFIED REGISTERED

## 2022-03-15 PROCEDURE — 710N000010 HC RECOVERY PHASE 1, LEVEL 2, PER MIN: Performed by: UROLOGY

## 2022-03-15 PROCEDURE — 999N000141 HC STATISTIC PRE-PROCEDURE NURSING ASSESSMENT: Performed by: UROLOGY

## 2022-03-15 PROCEDURE — 52356 CYSTO/URETERO W/LITHOTRIPSY: CPT | Performed by: NURSE ANESTHETIST, CERTIFIED REGISTERED

## 2022-03-15 PROCEDURE — 360N000083 HC SURGERY LEVEL 3 W/ FLUORO, PER MIN: Performed by: UROLOGY

## 2022-03-15 PROCEDURE — 999N000180 XR SURGERY CARM FLUORO LESS THAN 5 MIN: Mod: TC

## 2022-03-15 PROCEDURE — C1758 CATHETER, URETERAL: HCPCS | Performed by: UROLOGY

## 2022-03-15 PROCEDURE — 74420 UROGRAPHY RTRGR +-KUB: CPT | Mod: 26 | Performed by: UROLOGY

## 2022-03-15 PROCEDURE — 710N000012 HC RECOVERY PHASE 2, PER MINUTE: Performed by: UROLOGY

## 2022-03-15 PROCEDURE — 258N000001 HC RX 258: Performed by: UROLOGY

## 2022-03-15 PROCEDURE — 272N000002 HC OR SUPPLY OTHER OPNP: Performed by: UROLOGY

## 2022-03-15 PROCEDURE — 81025 URINE PREGNANCY TEST: CPT | Performed by: NURSE ANESTHETIST, CERTIFIED REGISTERED

## 2022-03-15 PROCEDURE — 52356 CYSTO/URETERO W/LITHOTRIPSY: CPT | Performed by: UROLOGY

## 2022-03-15 DEVICE — URETERAL STENT
Type: IMPLANTABLE DEVICE | Site: URETER | Status: FUNCTIONAL
Brand: CONTOUR™

## 2022-03-15 RX ORDER — OXYCODONE HYDROCHLORIDE 5 MG/1
5 TABLET ORAL EVERY 4 HOURS PRN
Status: DISCONTINUED | OUTPATIENT
Start: 2022-03-15 | End: 2022-03-15 | Stop reason: HOSPADM

## 2022-03-15 RX ORDER — KETOROLAC TROMETHAMINE 30 MG/ML
INJECTION, SOLUTION INTRAMUSCULAR; INTRAVENOUS PRN
Status: DISCONTINUED | OUTPATIENT
Start: 2022-03-15 | End: 2022-03-15

## 2022-03-15 RX ORDER — NALOXONE HYDROCHLORIDE 0.4 MG/ML
0.4 INJECTION, SOLUTION INTRAMUSCULAR; INTRAVENOUS; SUBCUTANEOUS
Status: DISCONTINUED | OUTPATIENT
Start: 2022-03-15 | End: 2022-03-15 | Stop reason: HOSPADM

## 2022-03-15 RX ORDER — PROPOFOL 10 MG/ML
INJECTION, EMULSION INTRAVENOUS PRN
Status: DISCONTINUED | OUTPATIENT
Start: 2022-03-15 | End: 2022-03-15

## 2022-03-15 RX ORDER — SODIUM CHLORIDE 9 MG/ML
INJECTION, SOLUTION INTRAVENOUS CONTINUOUS
Status: DISCONTINUED | OUTPATIENT
Start: 2022-03-15 | End: 2022-03-15 | Stop reason: HOSPADM

## 2022-03-15 RX ORDER — LIDOCAINE HYDROCHLORIDE 20 MG/ML
INJECTION, SOLUTION INFILTRATION; PERINEURAL PRN
Status: DISCONTINUED | OUTPATIENT
Start: 2022-03-15 | End: 2022-03-15

## 2022-03-15 RX ORDER — NALOXONE HYDROCHLORIDE 0.4 MG/ML
0.2 INJECTION, SOLUTION INTRAMUSCULAR; INTRAVENOUS; SUBCUTANEOUS
Status: DISCONTINUED | OUTPATIENT
Start: 2022-03-15 | End: 2022-03-15 | Stop reason: HOSPADM

## 2022-03-15 RX ORDER — DEXAMETHASONE SODIUM PHOSPHATE 4 MG/ML
INJECTION, SOLUTION INTRA-ARTICULAR; INTRALESIONAL; INTRAMUSCULAR; INTRAVENOUS; SOFT TISSUE PRN
Status: DISCONTINUED | OUTPATIENT
Start: 2022-03-15 | End: 2022-03-15

## 2022-03-15 RX ORDER — FENTANYL CITRATE 50 UG/ML
50 INJECTION, SOLUTION INTRAMUSCULAR; INTRAVENOUS EVERY 5 MIN PRN
Status: DISCONTINUED | OUTPATIENT
Start: 2022-03-15 | End: 2022-03-15 | Stop reason: HOSPADM

## 2022-03-15 RX ORDER — HYDROMORPHONE HYDROCHLORIDE 1 MG/ML
0.4 INJECTION, SOLUTION INTRAMUSCULAR; INTRAVENOUS; SUBCUTANEOUS EVERY 5 MIN PRN
Status: DISCONTINUED | OUTPATIENT
Start: 2022-03-15 | End: 2022-03-15 | Stop reason: HOSPADM

## 2022-03-15 RX ORDER — IOPAMIDOL 755 MG/ML
INJECTION, SOLUTION INTRAVASCULAR PRN
Status: DISCONTINUED | OUTPATIENT
Start: 2022-03-15 | End: 2022-03-15 | Stop reason: HOSPADM

## 2022-03-15 RX ORDER — ONDANSETRON 2 MG/ML
4 INJECTION INTRAMUSCULAR; INTRAVENOUS EVERY 30 MIN PRN
Status: DISCONTINUED | OUTPATIENT
Start: 2022-03-15 | End: 2022-03-15 | Stop reason: HOSPADM

## 2022-03-15 RX ORDER — HYDROCODONE BITARTRATE AND ACETAMINOPHEN 5; 325 MG/1; MG/1
1-2 TABLET ORAL EVERY 6 HOURS PRN
Qty: 20 TABLET | Refills: 0 | Status: SHIPPED | OUTPATIENT
Start: 2022-03-15 | End: 2022-04-26

## 2022-03-15 RX ORDER — FENTANYL CITRATE 50 UG/ML
50 INJECTION, SOLUTION INTRAMUSCULAR; INTRAVENOUS EVERY 10 MIN PRN
Status: DISCONTINUED | OUTPATIENT
Start: 2022-03-15 | End: 2022-03-15 | Stop reason: HOSPADM

## 2022-03-15 RX ORDER — MEPERIDINE HYDROCHLORIDE 50 MG/ML
12.5 INJECTION INTRAMUSCULAR; INTRAVENOUS; SUBCUTANEOUS
Status: DISCONTINUED | OUTPATIENT
Start: 2022-03-15 | End: 2022-03-15 | Stop reason: HOSPADM

## 2022-03-15 RX ORDER — CEFTRIAXONE SODIUM 2 G/50ML
2 INJECTION, SOLUTION INTRAVENOUS
Status: COMPLETED | OUTPATIENT
Start: 2022-03-15 | End: 2022-03-15

## 2022-03-15 RX ORDER — PROPOFOL 10 MG/ML
INJECTION, EMULSION INTRAVENOUS CONTINUOUS PRN
Status: DISCONTINUED | OUTPATIENT
Start: 2022-03-15 | End: 2022-03-15

## 2022-03-15 RX ORDER — KETAMINE HYDROCHLORIDE 10 MG/ML
INJECTION INTRAMUSCULAR; INTRAVENOUS PRN
Status: DISCONTINUED | OUTPATIENT
Start: 2022-03-15 | End: 2022-03-15

## 2022-03-15 RX ORDER — ONDANSETRON 2 MG/ML
INJECTION INTRAMUSCULAR; INTRAVENOUS PRN
Status: DISCONTINUED | OUTPATIENT
Start: 2022-03-15 | End: 2022-03-15

## 2022-03-15 RX ORDER — LIDOCAINE 40 MG/G
CREAM TOPICAL
Status: DISCONTINUED | OUTPATIENT
Start: 2022-03-15 | End: 2022-03-15 | Stop reason: HOSPADM

## 2022-03-15 RX ORDER — FENTANYL CITRATE 50 UG/ML
25 INJECTION, SOLUTION INTRAMUSCULAR; INTRAVENOUS
Status: DISCONTINUED | OUTPATIENT
Start: 2022-03-15 | End: 2022-03-15 | Stop reason: HOSPADM

## 2022-03-15 RX ORDER — ACYCLOVIR 200 MG/1
CAPSULE ORAL PRN
Status: DISCONTINUED | OUTPATIENT
Start: 2022-03-15 | End: 2022-03-15 | Stop reason: HOSPADM

## 2022-03-15 RX ORDER — ONDANSETRON 4 MG/1
4 TABLET, ORALLY DISINTEGRATING ORAL EVERY 30 MIN PRN
Status: DISCONTINUED | OUTPATIENT
Start: 2022-03-15 | End: 2022-03-15 | Stop reason: HOSPADM

## 2022-03-15 RX ORDER — FENTANYL CITRATE 50 UG/ML
INJECTION, SOLUTION INTRAMUSCULAR; INTRAVENOUS PRN
Status: DISCONTINUED | OUTPATIENT
Start: 2022-03-15 | End: 2022-03-15

## 2022-03-15 RX ADMIN — DEXAMETHASONE SODIUM PHOSPHATE 4 MG: 4 INJECTION, SOLUTION INTRAMUSCULAR; INTRAVENOUS at 14:11

## 2022-03-15 RX ADMIN — CEFTRIAXONE SODIUM 2 G: 2 INJECTION, SOLUTION INTRAVENOUS at 13:48

## 2022-03-15 RX ADMIN — MIDAZOLAM HYDROCHLORIDE 2 MG: 1 INJECTION, SOLUTION INTRAMUSCULAR; INTRAVENOUS at 14:11

## 2022-03-15 RX ADMIN — FENTANYL CITRATE 50 MCG: 50 INJECTION, SOLUTION INTRAMUSCULAR; INTRAVENOUS at 14:11

## 2022-03-15 RX ADMIN — FENTANYL CITRATE 50 MCG: 50 INJECTION, SOLUTION INTRAMUSCULAR; INTRAVENOUS at 12:30

## 2022-03-15 RX ADMIN — SODIUM CHLORIDE 10 ML/HR: 9 INJECTION, SOLUTION INTRAVENOUS at 10:41

## 2022-03-15 RX ADMIN — ONDANSETRON HYDROCHLORIDE 4 MG: 2 SOLUTION INTRAMUSCULAR; INTRAVENOUS at 14:11

## 2022-03-15 RX ADMIN — OXYCODONE HYDROCHLORIDE 5 MG: 5 TABLET ORAL at 15:45

## 2022-03-15 RX ADMIN — Medication 20 MG: at 14:11

## 2022-03-15 RX ADMIN — FENTANYL CITRATE 50 MCG: 50 INJECTION, SOLUTION INTRAMUSCULAR; INTRAVENOUS at 12:20

## 2022-03-15 RX ADMIN — FENTANYL CITRATE 50 MCG: 50 INJECTION INTRAMUSCULAR; INTRAVENOUS at 15:17

## 2022-03-15 RX ADMIN — FENTANYL CITRATE 50 MCG: 50 INJECTION INTRAMUSCULAR; INTRAVENOUS at 15:24

## 2022-03-15 RX ADMIN — KETOROLAC TROMETHAMINE 30 MG: 30 INJECTION, SOLUTION INTRAMUSCULAR at 14:23

## 2022-03-15 RX ADMIN — PROPOFOL 200 MG: 10 INJECTION, EMULSION INTRAVENOUS at 14:11

## 2022-03-15 RX ADMIN — PROPOFOL 200 MCG/KG/MIN: 10 INJECTION, EMULSION INTRAVENOUS at 14:11

## 2022-03-15 RX ADMIN — LIDOCAINE HYDROCHLORIDE 0.1 ML: 10 INJECTION, SOLUTION EPIDURAL; INFILTRATION; INTRACAUDAL; PERINEURAL at 10:43

## 2022-03-15 RX ADMIN — LIDOCAINE HYDROCHLORIDE 20 MG: 20 INJECTION, SOLUTION INFILTRATION; PERINEURAL at 14:11

## 2022-03-15 ASSESSMENT — LIFESTYLE VARIABLES: TOBACCO_USE: 1

## 2022-03-15 NOTE — ANESTHESIA PROCEDURE NOTES
Airway       Patient location during procedure: OR       Procedure Start/Stop Times: 3/15/2022 2:13 PM  Staff -        CRNA: Aster Tuttle APRN CRNA       Performed By: CRNA  Consent for Airway        Urgency: elective  Indications and Patient Condition       Indications for airway management: jailyn-procedural       Induction type:intravenous       Mask difficulty assessment: 0 - not attempted    Final Airway Details       Final airway type: supraglottic airway    Supraglottic Airway Details        Type: LMA       LMA size: 3    Post intubation assessment        Placement verified by: capnometry, equal breath sounds and chest rise        Number of attempts at approach: 2 (#4 LMA too large for pt mouth opening, pt has very small mouth opening)       Ease of procedure: easy (tight mouth opening-one finger breath approxminately)       Dentition: Intact

## 2022-03-15 NOTE — OR NURSING
Pt was having increased pain.  Obtained order for pain med from CRNA.  Pt is resting more comfortably at this time.  Pain 3/10.

## 2022-03-15 NOTE — OP NOTE
Preoperative diagnosis  Right  ureteral stone    Postoperative diagnosis  Right  ureteral stone    Procedure performed  Right  ureteroscopy with holmium-YAG laser lithotripsy and basket extraction of stone fragments  Cystoscopy with right retrograde pyelogram and ureteral stent placement  Interpretation of retrograde    Surgeon  Justo Jimenez MD    Surgeon(s)/Proceduralist(s) and Assistants (if any)  Surgeon(s):  Justo Jimenez MD  Circulator: Tl Krause RN  Relief Circulator: Sarah Mayfield RN  Scrub Person: Mila Hannah  2nd Scrub: Cheryl Mcneil RN    Specimen(s)  Yes  Stone fragments for biochemical analysis    (EBL) Estimated blood loss (ml)  5    Anesthesia  General    Complications  None    Findings  Cystoscopy revealed no tumors, stones or other mucosal abnormalities.  Retrograde pyelogram revealed a  moderately dilated system with identification of the stone seen on imaging.    Indications  31 year old female agreed to undergo the above named procedure after discussion of the alternatives, risks and benefits.    Informed consent was obtained.      Procedure  The patient was taken to the operating room and placed supine on the operating table.  Pre-operative antibiotics were administered.  Bilateral lower extremity SCDs were placed.  After induction of general anesthesia the patient was positioned in dorsal lithotomy, prepped and draped in a sterile fashion.  A time-out was performed.      I passed a 14 [place]gas[place] flexible cystoscope carefully via urethra into the bladder.  The right ureteral orifice was identified and a Sensor wire was passed retrograde to the level of the kidney and confirmed by fluoroscopy.  The flexible scope was off-loaded and the bladder emptied with a straight catheter.  An 8-10 coaxial dilator was passed without difficulty and then removed.  The MR6A semirigid ureteroscope was passed carefully along the Sensor wire through the urethra and into the distal ureter.  The stone  was encountered and fragmented with a thulium laser fiber.  The fragments were basket extracted.  At the completion of the procedure, all clinically significant fragments were removed from the ureter and only dust-like fragments remained.  The ureteroscope was withdrawn.  A 5-Vincentian open-ended was passed over the wire and the wire removed.  A retrograde pyelogram was performed by slowly injecting 5 mL of 50% Omnipaque contrast via the 5 Vincentian catheter with findings described above.  An Amplatz super-stiff was placed to the level of the renal pelvis confirmed by fluoroscopy.  A 6 x 24 Vincentian stent was positioned with the upper end in the upper pole and the lower in the bladder confirmed by fluoroscopy. The bladder was emptied and the procedure was complete. The patient tolerated the procedure well and was stable throughout.    Plan  Follow up in clinic for stent pull in the next week or so.

## 2022-03-15 NOTE — ANESTHESIA POSTPROCEDURE EVALUATION
Patient: Noemí Garcia    Procedure: Procedure(s):  Right ureteroscopy with laser lithotripsy an stent placement       Anesthesia Type:  General    Note:  Disposition: Outpatient   Postop Pain Control: Uneventful            Sign Out: Well controlled pain   PONV: No   Neuro/Psych: Uneventful            Sign Out: Acceptable/Baseline neuro status   Airway/Respiratory: Uneventful            Sign Out: Acceptable/Baseline resp. status   CV/Hemodynamics: Uneventful            Sign Out: Acceptable CV status; No obvious hypovolemia; No obvious fluid overload   Other NRE: NONE   DID A NON-ROUTINE EVENT OCCUR? No           Last vitals:  Vitals Value Taken Time   BP 98/71 03/15/22 1530   Temp 97.8  F (36.6  C) 03/15/22 1525   Pulse 53 03/15/22 1530   Resp 62 03/15/22 1530   SpO2 100 % 03/15/22 1530       Electronically Signed By: ANNE MARIE Perez CRNA  March 15, 2022  4:33 PM

## 2022-03-15 NOTE — ANESTHESIA CARE TRANSFER NOTE
Patient: Noemí Garcia    Procedure: Procedure(s):  Right ureteroscopy with laser lithotripsy an stent placement       Diagnosis: Right ureteral stone [N20.1]  Diagnosis Additional Information: No value filed.    Anesthesia Type:   General     Note:    Oropharynx: oropharynx clear of all foreign objects and spontaneously breathing  Level of Consciousness: drowsy  Oxygen Supplementation: face mask  Level of Supplemental Oxygen (L/min / FiO2): 8  Independent Airway: airway patency satisfactory and stable  Dentition: dentition unchanged  Vital Signs Stable: post-procedure vital signs reviewed and stable  Report to RN Given: handoff report given  Patient transferred to: PACU    Handoff Report: Identifed the Patient, Identified the Reponsible Provider, Reviewed the pertinent medical history, Discussed the surgical course, Reviewed Intra-OP anesthesia mangement and issues during anesthesia, Set expectations for post-procedure period and Allowed opportunity for questions and acknowledgement of understanding      Vitals:  Vitals Value Taken Time   BP     Temp     Pulse 64 03/15/22 1453   Resp 14 03/15/22 1453   SpO2 100 % 03/15/22 1453   Vitals shown include unvalidated device data.    Electronically Signed By: ANNE MARIE Perez CRNA  March 15, 2022  2:53 PM

## 2022-03-15 NOTE — INTERVAL H&P NOTE
I have reviewed the surgical (or preoperative) H&P that is linked to this encounter, and examined the patient. There are no significant changes    CTAB  RRR

## 2022-03-15 NOTE — OR NURSING
PACU Transfer Note    Noemí Garcia was transferred to DSU via cart.  Equipment used for transport:  none.  Accompanied by:  RN  Prescriptions were: faxed to [facility], paper copy in [facility]    PACU Respiratory Event Documentation     1) Episodes of Apnea greater than or equal to 10 seconds: 0    2) Bradypnea - less than 8 breaths per minute: 0    3) Pain score on 0 to 10 scale: 4/10    4) Pain-sedation mismatch (yes or no): no    5) Repeated 02 desaturation less than 90% (yes or no): no    Anesthesia notified? (yes or no): na    Any of the above events occuring repeatedly in separate 30 minute intervals may be considered recurrent PACU respiratory events.    Patient stable and meets phase 1 discharge criteria for transport from PACU.

## 2022-03-15 NOTE — DISCHARGE INSTRUCTIONS
[facility]  Adult Discharge Orders & Instructions      For 24 hours after surgery:  1. Get plenty of rest.  A responsible adult must stay with you for at least 24 hours after you leave the hospital.   2. You may feel lightheaded.  IF so, sit for a few minutes before standing.  Have someone help you get up.   3. You may have a slight fever. Call the doctor if your fever is over 101 F (38.3 C) (taken under the tongue) or lasts longer than 24 hours.  4. You may have a dry mouth, a sore throat, muscle aches or trouble sleeping.  These should go away after 24 hours.  5. Do not make important or legal decisions.  6.   Do not drive or use heavy equipment.  If you have weakness or tingling, don't drive or use heavy equipment until this feeling goes away.                                                                                                                                                                         To contact a doctor, call    [phone]______________

## 2022-03-15 NOTE — ANESTHESIA PREPROCEDURE EVALUATION
Anesthesia Pre-Procedure Evaluation    Patient: Noemí Garcia   MRN: [id number] : 1990        Procedure : Procedure(s):  Right ureteroscopy with laser lithotripsy an stent placement          Past Medical History:   Diagnosis Date     Abnormal Pap smear of cervix      Cancer (H)      Cerebral infarction (H)      Depressive disorder       Past Surgical History:   Procedure Laterality Date     AS REMOVE GROIN LYMPH NODES       BONE MARROW BIOPSY       COLONOSCOPY       COLPOSCOPY       EXTRACORPOREAL SHOCK WAVE LITHOTRIPSY, CYSTOSCOPY, INSERT STENT URETER(S), COMBINED       GALLBLADDER SURGERY       HEAD & NECK SURGERY      cyst removal     KIDNEY STONE SURGERY       RHINOPLASTY        Allergies   Allergen Reactions     Adhesive Tape Other (See Comments)     Blistered skin      Social History     Tobacco Use     Smoking status: Former Smoker     Types: Vaping Device     Smokeless tobacco: Never Used     Tobacco comment: 20: 1-2 per day   Substance Use Topics     Alcohol use: Not Currently      Wt Readings from Last 1 Encounters:   22 55.4 kg (122 lb 3.2 oz)        Anesthesia Evaluation   Pt has had prior anesthetic.     No history of anesthetic complications       ROS/MED HX  ENT/Pulmonary: Comment: Deviated nasal septum     (+) tobacco use, Past use,     Neurologic: Comment: CVA - drug induced in distant past    (+) CVA, without deficits,     Cardiovascular:       METS/Exercise Tolerance: >4 METS    Hematologic:  - neg hematologic  ROS     Musculoskeletal:  - neg musculoskeletal ROS     GI/Hepatic:  - neg GI/hepatic ROS     Renal/Genitourinary:     (+) Nephrolithiasis ,     Endo:  - neg endo ROS     Psychiatric/Substance Use:     (+) psychiatric history bipolar, anxiety and depression     Infectious Disease:  - neg infectious disease ROS     Malignancy:   (+) Malignancy, History of Lymphoma/Leukemia.Lymph CA Remission status post.        Other:  - neg other ROS          Physical Exam    Airway         Mallampati: I   TM distance: > 3 FB   Neck ROM: full   Mouth opening: > 3 cm    Respiratory Devices and Support         Dental  no notable dental history         Cardiovascular   cardiovascular exam normal       Rhythm and rate: regular and normal     Pulmonary   pulmonary exam normal        breath sounds clear to auscultation           OUTSIDE LABS:  CBC:   Lab Results   Component Value Date    WBC 5.8 03/12/2022    WBC 8.6 11/20/2021    HGB 12.8 03/12/2022    HGB 13.3 11/20/2021    HCT 38.6 03/12/2022    HCT 38.9 11/20/2021     03/12/2022     11/20/2021     BMP:   Lab Results   Component Value Date     03/12/2022     11/22/2021    POTASSIUM 4.0 03/12/2022    POTASSIUM 4.1 11/22/2021    CHLORIDE 106 03/12/2022    CHLORIDE 107 11/22/2021    CO2 27 03/12/2022    CO2 22 11/22/2021    BUN 12 03/12/2022    BUN 17 11/22/2021    CR 0.84 03/12/2022    CR 0.68 11/22/2021    GLC 88 03/12/2022    GLC 93 11/22/2021     COAGS: No results found for: PTT, INR, FIBR  POC:   Lab Results   Component Value Date    HCG Negative 03/12/2022     HEPATIC:   Lab Results   Component Value Date    ALBUMIN 4.3 03/12/2022    PROTTOTAL 6.4 03/12/2022    ALT 14 03/12/2022    AST 14 03/12/2022    ALKPHOS 45 03/12/2022    BILITOTAL 0.5 03/12/2022     OTHER:   Lab Results   Component Value Date    LACT 1.3 07/31/2021    GLENDY 9.3 03/12/2022    LIPASE 15 02/13/2021    CRP 13.0 (H) 11/22/2021       Anesthesia Plan    ASA Status:  2   NPO Status:  NPO Appropriate    Anesthesia Type: General.     - Airway: LMA   Induction: Intravenous, Propofol.   Maintenance: Balanced.        Consents    Anesthesia Plan(s) and associated risks, benefits, and realistic alternatives discussed. Questions answered and patient/representative(s) expressed understanding.     - Discussed: Risks, Benefits and Alternatives for BOTH SEDATION and the PROCEDURE were discussed     - Discussed with:  Patient      - Extended Intubation/Ventilatory Support  Discussed: No.      - Patient is DNR/DNI Status: No    Use of blood products discussed: No .     Postoperative Care    Pain management: IV analgesics.   PONV prophylaxis: Ondansetron (or other 5HT-3)     Comments:                ANNE MARIE Perez CRNA

## 2022-03-15 NOTE — OR NURSING
patient has been discharged to home at [facility] via wheelchair accompanied by     Written discharge instructions were provided to patient.  Prescriptions were escibed to [facility].      Patient and adult caring for them verbalize understanding of discharge instructions including no driving until tomorrow and no longer taking narcotic pain medications - no operating mechanical equipment and no making any important decisions.They understand reason for discharge, and necessary follow-up appointments.

## 2022-03-17 ENCOUNTER — TELEPHONE (OUTPATIENT)
Dept: UROLOGY | Facility: OTHER | Age: 32
End: 2022-03-17
Payer: COMMERCIAL

## 2022-03-17 NOTE — TELEPHONE ENCOUNTER
Pt states that she is feeling nauseated, has had the chills and stomach is upset.  Pt confirmed that she is taking the pain medication.  Encouraged patient to take medication with food or to stop taking all together and just use ibuprofen also if she does have an actual fever of 101 or higher she would need to go to the ER.  Patient was in agreement with plan.

## 2022-03-17 NOTE — TELEPHONE ENCOUNTER
RTN-patent feels that there is something wrong and she spiked a temp and her stomach hurts     Please call and advise  Thank You    Luh Kearns on 3/17/2022 at 12:51 PM

## 2022-03-21 ENCOUNTER — OFFICE VISIT (OUTPATIENT)
Dept: UROLOGY | Facility: OTHER | Age: 32
End: 2022-03-21
Attending: UROLOGY
Payer: COMMERCIAL

## 2022-03-21 VITALS
OXYGEN SATURATION: 96 % | TEMPERATURE: 97.6 F | HEART RATE: 96 BPM | WEIGHT: 121.4 LBS | BODY MASS INDEX: 22.2 KG/M2 | RESPIRATION RATE: 16 BRPM

## 2022-03-21 DIAGNOSIS — N20.1 RIGHT URETERAL STONE: Primary | ICD-10-CM

## 2022-03-21 PROCEDURE — 52310 CYSTOSCOPY AND TREATMENT: CPT | Performed by: UROLOGY

## 2022-03-21 PROCEDURE — G0463 HOSPITAL OUTPT CLINIC VISIT: HCPCS | Mod: 25

## 2022-03-21 PROCEDURE — 250N000013 HC RX MED GY IP 250 OP 250 PS 637: Performed by: UROLOGY

## 2022-03-21 RX ORDER — CEFUROXIME AXETIL 250 MG/1
500 TABLET ORAL EVERY 12 HOURS SCHEDULED
Status: COMPLETED | OUTPATIENT
Start: 2022-03-21 | End: 2022-03-21

## 2022-03-21 RX ADMIN — CEFUROXIME AXETIL 500 MG: 250 TABLET, FILM COATED ORAL at 14:17

## 2022-03-21 ASSESSMENT — PAIN SCALES - GENERAL: PAINLEVEL: NO PAIN (0)

## 2022-03-21 NOTE — PROGRESS NOTES
Preoperative diagnosis  Nephrolithiasis    Postoperative diagnosis  Nephrolithiasis    Procedure  Flexible cystourethroscopy with stent removal    Surgeon  Justo Jimenez MD    Anesthesia  2% lidocaine jelly intraurethrally    Complications  None    Indications  31 year old female who is status post ureteroscopy with holmium laser lithotripsy who presents for stent removal.    Procedure  The patient was given one dose of antibiotics. The patient was placed in supine position and was prepped and draped in sterile fashion.  2% lidocaine jelly was bluntly injected per urethra without difficulty. I passed the 14 [place] flexible cystoscope through the urethra and into the bladder.  With the aid of a stent grasper I grasped and removed the stent in its entirety.  The patient tolerated the procedure well.    Plan  Discussed generic dietary approach to stone prevention- provided hand out  Patient plans to collect [employer] and f/u after for results        I spent 10 minutes on this patient's visit (exclusive of separately billed services/procedures) and over half of this time was spent in face-to-face counseling regarding stone prevention:  Prevention strategies with fluids and diet, rationale for a metabolic work up, prognosis and importance of compliance.

## 2022-03-21 NOTE — PATIENT INSTRUCTIONS
Home Care after Cystoscopy  Follow these guidelines for your care after your procedure.    Activity  No limitations    Bathing or showering  No limitations    Symptoms  You may notice some burning with urination but this usually resolves after 1-2 days.  You may also notice small amounts of blood in your urine.  Please increase water intake for the next few days to help with these symptoms.    Contacts  General Questions: [phone]  Appointments:  [phone]  Emergencies:  [phone]    When to call the clinic  If you develop any of the following symptoms please call the clinic immediately.  If the clinic is closed please be seen at an urgent care clinic or the Emergency Department.  - Burning with urination that worsens after 2 days  - Unable to urinate causing severe pelvic pain  - Fevers of greater than 101 degrees F    Please follow the below generic recommendations to help prevent stones.    If you are interested in undergoing a work up (including blood and urine tests) to determine your patient specific factors for why you form stones and ways to specifically prevent stones in the future, ask Dr Jimenez if he hasn't already discussed this with you.      Fluids (Increase)  Please increase your fluid intake   Recommend about ten 10-ounce glasses of fluid per day (avoid dark rai).  Please try and keep your urine clear or pale yellow.    If it is dark yellow or cloudy it is concentrated and you need to drink more fluid.  Try drinking a tall glass of water prior to every snack/meal.    Citrate (Increase)  Citrate prevents stone formation and is naturally found in urine.    It can be increased by adding concentrated lemon juice (4 ounces daily) and/or drinking diluted orange juice (50/50 with water).    Both MinuteMaid Light and Crystal Light also contain citrate and can be helpful for stone prevention.    If you plan to drink sodas, I would recommend Diet/Sunkist and/or Diet/7UP as they contain the most citrate  (which is good) compared to the rai such as Coke/Pepsi.      Salt (Decrease)  Try to limit salt intake.  Total daily sodium intake should be less than 1500mg.  If you use salt with cooking don't add any additional salt at the table.    Protein  Limit protein intake to small to moderate portions.  For instance, a steak should be no larger than about the size of a deck of cards.  High protein intake leads to stone formation.              - Flank pain that is not responding to pain medication    Follow up  Please follow up as discussed at the appointment.

## 2022-03-21 NOTE — NURSING NOTE
Patient positioned in frog leg position prior to Justo Jimenez MD prepping patient with chlorhexidine gluconate cleanser.    [place] Protocol    A. Pre-procedure verification complete Yes   1-relevant information / documentation available, reviewed and properly matched to the patient; 2-consent accurate and complete, 3-equipment and supplies available    B. Site marking complete N/A  Site marked if not in continuous attendance with patient    C. TIME OUT completed Yes  Time Out was conducted just prior to starting procedure to verify the eight required elements: 1-patient identity, 2-consent accurate and complete, 3-position, 4-correct side/site marked (if applicable), 5-procedure, 6-relevant images / results properly labeled and displayed (if applicable), 7-antibiotics / irrigation fluids (if applicable), 8-safety precautions.    After procedure perineum area rinsed. Discharge instructions reviewed with patient. Patient verbalized understanding of discharge instructions and discharged ambulatory.  Va Mccarthy LPN..................3/21/2022  1:51 PM

## 2022-04-26 ENCOUNTER — APPOINTMENT (OUTPATIENT)
Dept: CT IMAGING | Facility: OTHER | Age: 32
End: 2022-04-26
Attending: EMERGENCY MEDICINE
Payer: COMMERCIAL

## 2022-04-26 ENCOUNTER — HOSPITAL ENCOUNTER (EMERGENCY)
Facility: OTHER | Age: 32
Discharge: HOME | End: 2022-04-26
Attending: EMERGENCY MEDICINE | Admitting: EMERGENCY MEDICINE
Payer: COMMERCIAL

## 2022-04-26 VITALS
HEIGHT: 62 IN | HEART RATE: 84 BPM | SYSTOLIC BLOOD PRESSURE: 97 MMHG | OXYGEN SATURATION: 99 % | TEMPERATURE: 96.8 F | RESPIRATION RATE: 12 BRPM | DIASTOLIC BLOOD PRESSURE: 61 MMHG | BODY MASS INDEX: 23 KG/M2 | WEIGHT: 125 LBS

## 2022-04-26 DIAGNOSIS — R11.2 NAUSEA VOMITING AND DIARRHEA: ICD-10-CM

## 2022-04-26 DIAGNOSIS — R19.7 NAUSEA VOMITING AND DIARRHEA: ICD-10-CM

## 2022-04-26 LAB
ALBUMIN SERPL-MCNC: 4.6 G/DL (ref 3.5–5.7)
ALBUMIN UR-MCNC: 10 MG/DL
ALP SERPL-CCNC: 48 U/L (ref 34–104)
ALT SERPL W P-5'-P-CCNC: 10 U/L (ref 7–52)
ANION GAP SERPL CALCULATED.3IONS-SCNC: 9 MMOL/L (ref 3–14)
APPEARANCE UR: CLEAR
AST SERPL W P-5'-P-CCNC: 12 U/L (ref 13–39)
BASE EXCESS BLDV CALC-SCNC: 1.1 MMOL/L (ref -7.7–1.9)
BASOPHILS # BLD AUTO: 0 10E3/UL (ref 0–0.2)
BASOPHILS NFR BLD AUTO: 0 %
BILIRUB SERPL-MCNC: 0.8 MG/DL (ref 0.3–1)
BILIRUB UR QL STRIP: NEGATIVE
BUN SERPL-MCNC: 17 MG/DL (ref 7–25)
CALCIUM SERPL-MCNC: 9.8 MG/DL (ref 8.6–10.3)
CHLORIDE BLD-SCNC: 104 MMOL/L (ref 98–107)
CO2 SERPL-SCNC: 24 MMOL/L (ref 21–31)
COLOR UR AUTO: YELLOW
CREAT SERPL-MCNC: 0.67 MG/DL (ref 0.6–1.2)
EOSINOPHIL # BLD AUTO: 0 10E3/UL (ref 0–0.7)
EOSINOPHIL NFR BLD AUTO: 0 %
ERYTHROCYTE [DISTWIDTH] IN BLOOD BY AUTOMATED COUNT: 12.1 % (ref 10–15)
FLUAV RNA SPEC QL NAA+PROBE: NEGATIVE
FLUBV RNA RESP QL NAA+PROBE: NEGATIVE
GFR SERPL CREATININE-BSD FRML MDRD: >90 ML/MIN/1.73M2
GLUCOSE BLD-MCNC: 109 MG/DL (ref 70–105)
GLUCOSE UR STRIP-MCNC: NEGATIVE MG/DL
HCO3 BLDV-SCNC: 23 MMOL/L (ref 21–28)
HCT VFR BLD AUTO: 44 % (ref 35–47)
HGB BLD-MCNC: 15.4 G/DL (ref 11.7–15.7)
HGB UR QL STRIP: NEGATIVE
HYALINE CASTS: 1 /LPF
IMM GRANULOCYTES # BLD: 0 10E3/UL
IMM GRANULOCYTES NFR BLD: 0 %
KETONES UR STRIP-MCNC: NEGATIVE MG/DL
LACTATE SERPL-SCNC: 1.4 MMOL/L (ref 0.7–2)
LEUKOCYTE ESTERASE UR QL STRIP: ABNORMAL
LYMPHOCYTES # BLD AUTO: 0.4 10E3/UL (ref 0.8–5.3)
LYMPHOCYTES NFR BLD AUTO: 4 %
MCH RBC QN AUTO: 28.7 PG (ref 26.5–33)
MCHC RBC AUTO-ENTMCNC: 35 G/DL (ref 31.5–36.5)
MCV RBC AUTO: 82 FL (ref 78–100)
MONOCYTES # BLD AUTO: 0.3 10E3/UL (ref 0–1.3)
MONOCYTES NFR BLD AUTO: 3 %
MUCOUS THREADS #/AREA URNS LPF: PRESENT /LPF
NEUTROPHILS # BLD AUTO: 9 10E3/UL (ref 1.6–8.3)
NEUTROPHILS NFR BLD AUTO: 93 %
NITRATE UR QL: NEGATIVE
NRBC # BLD AUTO: 0 10E3/UL
NRBC BLD AUTO-RTO: 0 /100
O2/TOTAL GAS SETTING VFR VENT: 0 %
OXYHGB MFR BLDV: 96 % (ref 70–75)
PCO2 BLDV: 29 MM HG (ref 40–50)
PH BLDV: 7.51 [PH] (ref 7.32–7.43)
PH UR STRIP: 6.5 [PH] (ref 5–9)
PLATELET # BLD AUTO: 277 10E3/UL (ref 150–450)
PO2 BLDV: 81 MM HG (ref 25–47)
POTASSIUM BLD-SCNC: 3.5 MMOL/L (ref 3.5–5.1)
PROT SERPL-MCNC: 6.8 G/DL (ref 6.4–8.9)
RBC # BLD AUTO: 5.36 10E6/UL (ref 3.8–5.2)
RBC URINE: 1 /HPF
RSV RNA SPEC NAA+PROBE: NEGATIVE
SARS-COV-2 RNA RESP QL NAA+PROBE: NEGATIVE
SODIUM SERPL-SCNC: 137 MMOL/L (ref 134–144)
SP GR UR STRIP: 1.03 (ref 1–1.03)
SQUAMOUS EPITHELIAL: 2 /HPF
UROBILINOGEN UR STRIP-MCNC: NORMAL MG/DL
WBC # BLD AUTO: 9.7 10E3/UL (ref 4–11)
WBC URINE: 18 /HPF

## 2022-04-26 PROCEDURE — 80053 COMPREHEN METABOLIC PANEL: CPT | Performed by: EMERGENCY MEDICINE

## 2022-04-26 PROCEDURE — 87637 SARSCOV2&INF A&B&RSV AMP PRB: CPT | Performed by: EMERGENCY MEDICINE

## 2022-04-26 PROCEDURE — 99284 EMERGENCY DEPT VISIT MOD MDM: CPT | Mod: 25 | Performed by: EMERGENCY MEDICINE

## 2022-04-26 PROCEDURE — 258N000003 HC RX IP 258 OP 636: Performed by: EMERGENCY MEDICINE

## 2022-04-26 PROCEDURE — 99284 EMERGENCY DEPT VISIT MOD MDM: CPT | Performed by: EMERGENCY MEDICINE

## 2022-04-26 PROCEDURE — 81001 URINALYSIS AUTO W/SCOPE: CPT | Performed by: EMERGENCY MEDICINE

## 2022-04-26 PROCEDURE — 250N000011 HC RX IP 250 OP 636: Performed by: EMERGENCY MEDICINE

## 2022-04-26 PROCEDURE — 85025 COMPLETE CBC W/AUTO DIFF WBC: CPT | Performed by: EMERGENCY MEDICINE

## 2022-04-26 PROCEDURE — 82805 BLOOD GASES W/O2 SATURATION: CPT | Performed by: EMERGENCY MEDICINE

## 2022-04-26 PROCEDURE — 83605 ASSAY OF LACTIC ACID: CPT | Performed by: EMERGENCY MEDICINE

## 2022-04-26 PROCEDURE — 36415 COLL VENOUS BLD VENIPUNCTURE: CPT | Performed by: EMERGENCY MEDICINE

## 2022-04-26 PROCEDURE — 87086 URINE CULTURE/COLONY COUNT: CPT | Performed by: EMERGENCY MEDICINE

## 2022-04-26 PROCEDURE — C9803 HOPD COVID-19 SPEC COLLECT: HCPCS | Performed by: EMERGENCY MEDICINE

## 2022-04-26 PROCEDURE — 74176 CT ABD & PELVIS W/O CONTRAST: CPT

## 2022-04-26 PROCEDURE — 99283 EMERGENCY DEPT VISIT LOW MDM: CPT | Mod: CS | Performed by: EMERGENCY MEDICINE

## 2022-04-26 RX ORDER — ACETAMINOPHEN 500 MG
1000 TABLET ORAL ONCE
Status: DISCONTINUED | OUTPATIENT
Start: 2022-04-26 | End: 2022-04-26 | Stop reason: CLARIF

## 2022-04-26 RX ORDER — ONDANSETRON 4 MG/1
4 TABLET, ORALLY DISINTEGRATING ORAL ONCE
Status: COMPLETED | OUTPATIENT
Start: 2022-04-26 | End: 2022-04-26

## 2022-04-26 RX ORDER — SODIUM CHLORIDE 9 MG/ML
INJECTION, SOLUTION INTRAVENOUS CONTINUOUS
Status: DISCONTINUED | OUTPATIENT
Start: 2022-04-26 | End: 2022-04-26 | Stop reason: HOSPADM

## 2022-04-26 RX ORDER — ONDANSETRON 4 MG/1
4 TABLET, ORALLY DISINTEGRATING ORAL EVERY 8 HOURS PRN
Qty: 10 TABLET | Refills: 0 | Status: SHIPPED | OUTPATIENT
Start: 2022-04-26 | End: 2023-07-12

## 2022-04-26 RX ADMIN — ONDANSETRON 4 MG: 4 TABLET, ORALLY DISINTEGRATING ORAL at 11:52

## 2022-04-26 RX ADMIN — SODIUM CHLORIDE 1000 ML: 9 INJECTION, SOLUTION INTRAVENOUS at 12:15

## 2022-04-26 ASSESSMENT — ENCOUNTER SYMPTOMS
APNEA: 0
SHORTNESS OF BREATH: 0
ABDOMINAL PAIN: 1
CONSTIPATION: 0
FEVER: 1
NAUSEA: 1
DIFFICULTY URINATING: 0
DIARRHEA: 1
ACTIVITY CHANGE: 1
VOMITING: 1
CHILLS: 1
BLOOD IN STOOL: 0
CHEST TIGHTNESS: 0

## 2022-04-26 NOTE — Clinical Note
Noemí Garcia was seen and treated in our emergency department on 4/26/2022.  She may return to work on 04/28/2022.       If you have any questions or concerns, please don't hesitate to call.      Vijaya Llanos MD

## 2022-04-26 NOTE — ED PROVIDER NOTES
History     Chief Complaint   Patient presents with     Abdominal Pain     Nausea, Vomiting, & Diarrhea     HPI  Noemí Garcia is a 31 year old female who presents today with 8 hour history of nausea, vomiting and diarrhea. She says she woke up at around 3 AM with fevers up to 101, NV, diarrhea. She has been unable to keep anything down and feels that she is dehydrated. She says that last week her and the family all had sinus infections, but nothing like this. Family all ate the same things yesterday and only she is sick. She also endorses pain around her belly button, sharp stabbing pain, tender to palpation.     PMH significant for hx of hodgkin's lymphoma, bipolar II, kidney stones w/ lithotripsy and stent placement, cerebral infarction.      Allergies:  Allergies   Allergen Reactions     Adhesive Tape Other (See Comments)     Blistered skin       Problem List:    Patient Active Problem List    Diagnosis Date Noted     Submandibular abscess 11/20/2021     Priority: Medium     Postpartum endometritis 07/30/2021     Priority: Medium     History of lymphoma 07/28/2021     Priority: Medium     ASCUS with positive high risk HPV cervical 07/28/2021     Priority: Medium     Hodgkin's disease of lymph nodes of multiple sites (H) 11/12/2019     Priority: Medium     Bipolar II disorder, moderate, depressed, with anxious distress (H) 10/05/2018     Priority: Medium     Drug overdose 06/14/2017     Priority: Medium     Overdose of antidepressant 06/14/2017     Priority: Medium     Biliary colic 04/25/2017     Priority: Medium     Deviated nasal septum 03/10/2016     Priority: Medium     Generalized anxiety disorder 03/02/2016     Priority: Medium     Kidney stones 06/16/2015     Priority: Medium     Renal colic 05/23/2015     Priority: Medium     Anal fissure 07/10/2013     Priority: Medium        Past Medical History:    Past Medical History:   Diagnosis Date     Abnormal Pap smear of cervix      Cancer (H)      Cerebral  infarction (H)      Depressive disorder        Past Surgical History:    Past Surgical History:   Procedure Laterality Date     AS REMOVE GROIN LYMPH NODES       BONE MARROW BIOPSY       COLONOSCOPY       COLPOSCOPY       COMBINED CYSTOSCOPY, URETEROSCOPY, LASER HOLMIUM LITHOTRIPSY URETER(S) Right 3/15/2022    Procedure: Right ureteroscopy with laser lithotripsy an stent placement;  Surgeon: Justo Jimenez MD;  Location: [facility] OR     EXTRACORPOREAL SHOCK WAVE LITHOTRIPSY, CYSTOSCOPY, INSERT STENT URETER(S), COMBINED       GALLBLADDER SURGERY       HEAD & NECK SURGERY      cyst removal     KIDNEY STONE SURGERY       RHINOPLASTY         Family History:    Family History   Problem Relation Age of Onset     No Known Problems Mother      Heart Disease Father         MIs     No Known Problems Sister      No Known Problems Sister      No Known Problems Brother      No Known Problems Brother      No Known Problems Brother      Alzheimer Disease Maternal Grandmother      Heart Disease Maternal Grandfather         MI or stroke?     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   Respiratory: Negative for  "apnea, chest tightness and shortness of breath.    Cardiovascular: Negative for chest pain.   Gastrointestinal: Positive for abdominal pain, diarrhea, nausea and vomiting. Negative for blood in stool and constipation.   Genitourinary: Negative for difficulty urinating.       Physical Exam   BP: 121/70  Pulse: (!) 129  Temp: 96.8  F (36  C)  Resp: 16  Height: 157.5 cm (5' 2\")  Weight: 56.7 kg (125 lb)  SpO2: 97 %      Physical Exam  Constitutional:       Appearance: She is ill-appearing.   HENT:      Head: Normocephalic and atraumatic.   Cardiovascular:      Rate and Rhythm: Regular rhythm. Tachycardia present.      Heart sounds: Normal heart sounds.   Pulmonary:      Effort: Pulmonary effort is normal.      Breath sounds: Normal breath sounds.   Abdominal:      General: Abdomen is flat. Bowel sounds are increased.      Tenderness: There is abdominal tenderness in the suprapubic area. There is right CVA tenderness.   Skin:     General: Skin is warm and dry.   Neurological:      General: No focal deficit present.      Mental Status: She is alert.   Psychiatric:         Mood and Affect: Mood normal.         Behavior: Behavior normal.         ED Course              ED Course as of 04/26/22 1414   Tue Apr 26, 2022   1331 WBC Urine(!): 18     Procedures                Results for orders placed or performed during the hospital encounter of 04/26/22 (from the past 24 hour(s))   CBC with platelets differential    Narrative    The following orders were created for panel order CBC with platelets differential.  Procedure                               Abnormality         Status                     ---------                               -----------         ------                     CBC with platelets and d...[[id number]]  Abnormal            Final result                 Please view results for these tests on the individual orders.   Comprehensive metabolic panel   Result Value Ref Range    Sodium 137 134 - 144 mmol/L    " Potassium 3.5 3.5 - 5.1 mmol/L    Chloride 104 98 - 107 mmol/L    Carbon Dioxide (CO2) 24 21 - 31 mmol/L    Anion Gap 9 3 - 14 mmol/L    Urea Nitrogen 17 7 - 25 mg/dL    Creatinine 0.67 0.60 - 1.20 mg/dL    Calcium 9.8 8.6 - 10.3 mg/dL    Glucose 109 (H) 70 - 105 mg/dL    Alkaline Phosphatase 48 34 - 104 U/L    AST 12 (L) 13 - 39 U/L    ALT 10 7 - 52 U/L    Protein Total 6.8 6.4 - 8.9 g/dL    Albumin 4.6 3.5 - 5.7 g/dL    Bilirubin Total 0.8 0.3 - 1.0 mg/dL    GFR Estimate >90 >60 mL/min/1.73m2   Lactic acid whole blood   Result Value Ref Range    Lactic Acid 1.4 0.7 - 2.0 mmol/L   Blood gas venous and oxyhgb   Result Value Ref Range    pH Venous 7.51 (H) 7.32 - 7.43    pCO2 Venous 29 (L) 40 - 50 mm Hg    pO2 Venous 81 (H) 25 - 47 mm Hg    Bicarbonate Venous 23 21 - 28 mmol/L    FIO2 0     Oxyhemoglobin Venous 96 (H) 70 - 75 %    Base Excess/Deficit (+/-) 1.1 -7.7 - 1.9 mmol/L   Symptomatic; Yes; 4/26/2022 Influenza A/B & SARS-CoV2 (COVID-19) Virus PCR Multiplex Nasopharyngeal    Specimen: Nasopharyngeal; Swab   Result Value Ref Range    Influenza A PCR Negative Negative    Influenza B PCR Negative Negative    RSV PCR Negative Negative    SARS CoV2 PCR Negative Negative    Narrative    Testing was performed using the Xpert Xpress CoV2/Flu/RSV Assay on the Cepheid GeneXpert Instrument. This test should be ordered for the detection of SARS-CoV-2 and influenza viruses in individuals who meet clinical and/or epidemiological criteria. Test performance is unknown in asymptomatic patients. This test is for in vitro diagnostic use under the FDA EUA for laboratories certified under CLIA to perform high or moderate complexity testing. This test has not been FDA cleared or approved. A negative result does not rule out the presence of PCR inhibitors in the specimen or target RNA in concentration below the limit of detection for the assay. If only one viral target is positive but coinfection with multiple targets is suspected,  the sample should be re-tested with another FDA cleared, approved, or authorized test, if coinfection would change clinical management. This test was validated by the [facility]. These laboratories are certified under the Clinical  Laboratory Improvement Amendments of 1988 (CLIA-88) as qualified to perform high complexity laboratory testing.   CBC with platelets and differential   Result Value Ref Range    WBC Count 9.7 4.0 - 11.0 10e3/uL    RBC Count 5.36 (H) 3.80 - 5.20 10e6/uL    Hemoglobin 15.4 11.7 - 15.7 g/dL    Hematocrit 44.0 35.0 - 47.0 %    MCV 82 78 - 100 fL    MCH 28.7 26.5 - 33.0 pg    MCHC 35.0 31.5 - 36.5 g/dL    RDW 12.1 10.0 - 15.0 %    Platelet Count 277 150 - 450 10e3/uL    % Neutrophils 93 %    % Lymphocytes 4 %    % Monocytes 3 %    % Eosinophils 0 %    % Basophils 0 %    % Immature Granulocytes 0 %    NRBCs per 100 WBC 0 <1 /100    Absolute Neutrophils 9.0 (H) 1.6 - 8.3 10e3/uL    Absolute Lymphocytes 0.4 (L) 0.8 - 5.3 10e3/uL    Absolute Monocytes 0.3 0.0 - 1.3 10e3/uL    Absolute Eosinophils 0.0 0.0 - 0.7 10e3/uL    Absolute Basophils 0.0 0.0 - 0.2 10e3/uL    Absolute Immature Granulocytes 0.0 <=0.4 10e3/uL    Absolute NRBCs 0.0 10e3/uL   UA with Microscopic reflex to Culture    Specimen: Urine, Clean Catch   Result Value Ref Range    Color Urine Yellow Colorless, Straw, Light Yellow, Yellow    Appearance Urine Clear Clear    Glucose Urine Negative Negative mg/dL    Bilirubin Urine Negative Negative    Ketones Urine Negative Negative mg/dL    Specific Gravity Urine 1.026 1.000 - 1.030    Blood Urine Negative Negative    pH Urine 6.5 5.0 - 9.0    Protein Albumin Urine 10  (A) Negative mg/dL    Urobilinogen Urine Normal Normal, 2.0 mg/dL    Nitrite Urine Negative Negative    Leukocyte Esterase Urine Moderate (A) Negative    Mucus Urine Present (A) None Seen /LPF    RBC Urine 1 <=2 /HPF    WBC Urine 18 (H) <=5 /HPF    Squamous Epithelials Urine 2 (H) <=1 /HPF    Hyaline Casts  Urine 1 <=2 /LPF    Narrative    Urine Culture ordered based on laboratory criteria   CT Abdomen Pelvis w/o Contrast    Narrative    PROCEDURE: CT ABDOMEN PELVIS W/O CONTRAST 4/26/2022 1:34 PM    HISTORY: Flank pain, kidney stone suspected    COMPARISONS: 3/12/2022.    Meds/Dose Given: None.    TECHNIQUE: Axial noncontrast enhanced images with coronal and sagittal  reformatted images.    FINDINGS: Lung bases are clear.    No focal abnormality is seen in the liver, spleen, adrenal glands or  in the pancreas. There has been a cholecystectomy.    There are multiple very small bilateral renal calculi primarily in the  renal pyramid regions. No ureteral stone or hydronephrosis is seen.  Stone previously seen in the distal right ureter is no longer  visualized in previously seen hydronephrosis has resolved.    No bowel abnormality is seen. There is no inflammatory change or focal  fluid collection. No lymph node enlargement is seen.    There is no aneurysm. No bone lesion is seen.         Impression    IMPRESSION:   1. Multiple very small bilateral renal calculi without ureteral stone  or hydronephrosis.  2. Resolution of previously seen hydronephrosis. Distal right ureteral  stone is no longer visualized.    SHAHRIAR NEAL MD         SYSTEM ID:  [id number]       Medications   sodium chloride (PF) 0.9% PF flush 3 mL (has no administration in time range)   sodium chloride (PF) 0.9% PF flush 3 mL (has no administration in time range)   0.9% sodium chloride BOLUS (0 mLs Intravenous Stopped 4/26/22 1328)     Followed by   sodium chloride 0.9% infusion (has no administration in time range)   ondansetron (ZOFRAN-ODT) ODT tab 4 mg (4 mg Oral Given 4/26/22 1152)       Assessments & Plan (with Medical Decision Making)   Noemí Garcia is a 31 year old female who presents today with 8 hour history of nausea, vomiting and diarrhea. She says she woke up at around 3 AM with fevers up to 101, NV, diarrhea. She has been unable to keep  "anything down and feels that she is dehydrated. She says that last week her and the family all had sinus infections, but nothing like this. Family all ate the same things yesterday and only she is sick. She also endorses pain around her belly button, sharp stabbing pain, tender to palpation.     PMH significant for hx of hodgkin's lymphoma, bipolar II, kidney stones w/ lithotripsy and stent placement, cerebral infarction.    BP 97/61   Pulse 84   Temp 96.8  F (36  C) (Temporal)   Resp 12   Ht 1.575 m (5' 2\")   Wt 56.7 kg (125 lb)   LMP 04/19/2022 (Exact Date)   SpO2 99%   BMI 22.86 kg/m     Labs reassuring, most likely a gastroenteritis. CT shows small renal calculi, but no hydronephrosis. Patient safe to go home with prescription for zofran and encouraged PO intake. Patient is agreeable to this plan.     I have reviewed the nursing notes.    I have reviewed the findings, diagnosis, plan and need for follow up with the patient.      New Prescriptions    ONDANSETRON (ZOFRAN-ODT) 4 MG ODT TAB    Take 1 tablet (4 mg) by mouth every 8 hours as needed for nausea       Final diagnoses:   Nausea vomiting and diarrhea     David Francinebreanna MS3  [employer]  4/26/2022   [facility]    Medical Attestation  I performed history and exam and agree with findings as above  MD Viet Cabrera Theresa M, MD  05/01/22 0019    "

## 2022-04-26 NOTE — ED TRIAGE NOTES
"ED Nursing Triage Note (General)   ________________________________    Noemí Garcia is a 31 year old Female that presents to triage private car  With history of right sided abdominal pain described as \"sharp stabbing\" pain with nausea, vomiting and some diarrhea since 0300 today. Patient states she had a fever of 101.5 at home, took tylenol at 1050.     /70   Pulse (!) 129   Temp 96.8  F (36  C) (Temporal)   Resp 16   Ht 1.575 m (5' 2\")   Wt 56.7 kg (125 lb)   LMP 03/12/2022 (Exact Date)   SpO2 97%   BMI 22.86 kg/m  t         Triage Assessment     Row Name 04/26/22 1128       Triage Assessment (Adult)    Airway WDL WDL       Respiratory WDL    Respiratory WDL WDL       Skin Circulation/Temperature WDL    Skin Circulation/Temperature WDL WDL       Cardiac WDL    Cardiac WDL WDL       Peripheral/Neurovascular WDL    Peripheral Neurovascular WDL WDL       Cognitive/Neuro/Behavioral WDL    Cognitive/Neuro/Behavioral WDL WDL              "

## 2022-04-28 LAB — BACTERIA UR CULT: NO GROWTH

## 2022-04-28 NOTE — RESULT ENCOUNTER NOTE
"Final urine culture report shows \"NO GROWTH\" and is NEGATIVE.  [facility] Emergency Dept discharge antibiotic: None  Recommendations in treatment per [facility] ED Lab result Urine culture protocol.  "

## 2022-05-22 ENCOUNTER — HEALTH MAINTENANCE LETTER (OUTPATIENT)
Age: 32
End: 2022-05-22

## 2022-08-09 ENCOUNTER — ALLIED HEALTH/NURSE VISIT (OUTPATIENT)
Dept: FAMILY MEDICINE | Facility: OTHER | Age: 32
End: 2022-08-09
Attending: PHYSICIAN ASSISTANT
Payer: COMMERCIAL

## 2022-08-09 DIAGNOSIS — Z11.1 VISIT FOR MANTOUX TEST: Primary | ICD-10-CM

## 2022-08-09 PROCEDURE — 86580 TB INTRADERMAL TEST: CPT

## 2022-08-09 PROCEDURE — 250N000009 HC RX 250: Performed by: PHYSICIAN ASSISTANT

## 2022-08-09 RX ADMIN — TUBERCULIN PURIFIED PROTEIN DERIVATIVE 5 UNITS: 5 INJECTION, SOLUTION INTRADERMAL at 13:18

## 2022-08-09 NOTE — PROGRESS NOTES
MANTOUX ADMINISTRATION    Pt presented to Nurse Injection room today for TB screening, as required by [employer]. Mantoux administered intradermal on LEFT anterior forearm, per protocol. Pt has appropriate follow-up appointment scheduled for evaluation of injection site.    Nicole Pagan RN ....................  8/9/2022   1:15 PM

## 2022-08-11 ENCOUNTER — ALLIED HEALTH/NURSE VISIT (OUTPATIENT)
Dept: FAMILY MEDICINE | Facility: OTHER | Age: 32
End: 2022-08-11
Attending: PHYSICIAN ASSISTANT
Payer: COMMERCIAL

## 2022-08-11 DIAGNOSIS — Z11.1 VISIT FOR MANTOUX TEST: Primary | ICD-10-CM

## 2022-08-11 NOTE — PROGRESS NOTES
MANTOUX RESULTS READING    Pt presented to Nurse Injection room today for evaluation of Mantoux injection site. Site is NEGATIVE.    Nicole Pagan RN ....................  8/11/2022   1:44 PM

## 2022-08-24 ENCOUNTER — ALLIED HEALTH/NURSE VISIT (OUTPATIENT)
Dept: FAMILY MEDICINE | Facility: OTHER | Age: 32
End: 2022-08-24
Attending: PHYSICIAN ASSISTANT
Payer: COMMERCIAL

## 2022-08-24 DIAGNOSIS — Z11.1 VISIT FOR MANTOUX TEST: Primary | ICD-10-CM

## 2022-08-24 PROCEDURE — 250N000009 HC RX 250: Performed by: PHYSICIAN ASSISTANT

## 2022-08-24 PROCEDURE — 86580 TB INTRADERMAL TEST: CPT

## 2022-08-24 RX ADMIN — TUBERCULIN PURIFIED PROTEIN DERIVATIVE 5 UNITS: 5 INJECTION, SOLUTION INTRADERMAL at 15:17

## 2022-08-26 ENCOUNTER — ALLIED HEALTH/NURSE VISIT (OUTPATIENT)
Dept: FAMILY MEDICINE | Facility: OTHER | Age: 32
End: 2022-08-26
Attending: PHYSICIAN ASSISTANT
Payer: COMMERCIAL

## 2022-08-26 DIAGNOSIS — Z11.1 VISIT FOR MANTOUX TEST: Primary | ICD-10-CM

## 2022-08-26 NOTE — PROGRESS NOTES
MANTOUX RESULTS READING    Pt presented to Nurse Injection room today for evaluation of Mantoux injection site. Site is NEGATIVE.    Nicole Pagan RN ....................  8/26/2022   4:02 PM

## 2022-10-05 ENCOUNTER — HOSPITAL ENCOUNTER (OUTPATIENT)
Dept: GENERAL RADIOLOGY | Facility: OTHER | Age: 32
Discharge: HOME | End: 2022-10-05
Attending: PHYSICIAN ASSISTANT
Payer: COMMERCIAL

## 2022-10-05 ENCOUNTER — OFFICE VISIT (OUTPATIENT)
Dept: FAMILY MEDICINE | Facility: OTHER | Age: 32
End: 2022-10-05
Attending: PHYSICIAN ASSISTANT
Payer: COMMERCIAL

## 2022-10-05 VITALS
DIASTOLIC BLOOD PRESSURE: 78 MMHG | BODY MASS INDEX: 21.03 KG/M2 | OXYGEN SATURATION: 97 % | RESPIRATION RATE: 14 BRPM | TEMPERATURE: 98.6 F | SYSTOLIC BLOOD PRESSURE: 102 MMHG | WEIGHT: 115 LBS | HEART RATE: 65 BPM

## 2022-10-05 DIAGNOSIS — M79.10 MUSCLE ACHE: ICD-10-CM

## 2022-10-05 DIAGNOSIS — M79.642 BILATERAL HAND PAIN: Primary | ICD-10-CM

## 2022-10-05 DIAGNOSIS — M54.2 CERVICALGIA: ICD-10-CM

## 2022-10-05 DIAGNOSIS — M79.642 BILATERAL HAND PAIN: ICD-10-CM

## 2022-10-05 DIAGNOSIS — M79.641 BILATERAL HAND PAIN: ICD-10-CM

## 2022-10-05 DIAGNOSIS — M79.641 BILATERAL HAND PAIN: Primary | ICD-10-CM

## 2022-10-05 LAB
ALBUMIN SERPL-MCNC: 4.7 G/DL (ref 3.5–5.7)
ALP SERPL-CCNC: 43 U/L (ref 34–104)
ALT SERPL W P-5'-P-CCNC: 11 U/L (ref 7–52)
ANION GAP SERPL CALCULATED.3IONS-SCNC: 7 MMOL/L (ref 3–14)
AST SERPL W P-5'-P-CCNC: 14 U/L (ref 13–39)
BASOPHILS # BLD AUTO: 0 10E3/UL (ref 0–0.2)
BASOPHILS NFR BLD AUTO: 0 %
BILIRUB SERPL-MCNC: 0.7 MG/DL (ref 0.3–1)
BUN SERPL-MCNC: 17 MG/DL (ref 7–25)
CALCIUM SERPL-MCNC: 10.1 MG/DL (ref 8.6–10.3)
CHLORIDE BLD-SCNC: 104 MMOL/L (ref 98–107)
CO2 SERPL-SCNC: 29 MMOL/L (ref 21–31)
CREAT SERPL-MCNC: 1.11 MG/DL (ref 0.6–1.2)
CRP SERPL-MCNC: <1 MG/L
EOSINOPHIL # BLD AUTO: 0.1 10E3/UL (ref 0–0.7)
EOSINOPHIL NFR BLD AUTO: 1 %
ERYTHROCYTE [DISTWIDTH] IN BLOOD BY AUTOMATED COUNT: 12.1 % (ref 10–15)
ERYTHROCYTE [SEDIMENTATION RATE] IN BLOOD BY WESTERGREN METHOD: 2 MM/HR (ref 0–20)
GFR SERPL CREATININE-BSD FRML MDRD: 67 ML/MIN/1.73M2
GLUCOSE BLD-MCNC: 84 MG/DL (ref 70–105)
HCT VFR BLD AUTO: 40.5 % (ref 35–47)
HGB BLD-MCNC: 13.8 G/DL (ref 11.7–15.7)
IMM GRANULOCYTES # BLD: 0 10E3/UL
IMM GRANULOCYTES NFR BLD: 0 %
LYMPHOCYTES # BLD AUTO: 1.8 10E3/UL (ref 0.8–5.3)
LYMPHOCYTES NFR BLD AUTO: 28 %
MCH RBC QN AUTO: 28.3 PG (ref 26.5–33)
MCHC RBC AUTO-ENTMCNC: 34.1 G/DL (ref 31.5–36.5)
MCV RBC AUTO: 83 FL (ref 78–100)
MONOCYTES # BLD AUTO: 0.4 10E3/UL (ref 0–1.3)
MONOCYTES NFR BLD AUTO: 6 %
NEUTROPHILS # BLD AUTO: 4.1 10E3/UL (ref 1.6–8.3)
NEUTROPHILS NFR BLD AUTO: 65 %
NRBC # BLD AUTO: 0 10E3/UL
NRBC BLD AUTO-RTO: 0 /100
PLATELET # BLD AUTO: 259 10E3/UL (ref 150–450)
POTASSIUM BLD-SCNC: 4.1 MMOL/L (ref 3.5–5.1)
PROT SERPL-MCNC: 7 G/DL (ref 6.4–8.9)
RBC # BLD AUTO: 4.87 10E6/UL (ref 3.8–5.2)
RHEUMATOID FACT SER NEPH-ACNC: <14 IU/ML
SODIUM SERPL-SCNC: 140 MMOL/L (ref 134–144)
TSH SERPL DL<=0.005 MIU/L-ACNC: 1.1 MU/L (ref 0.4–4)
URATE SERPL-MCNC: 3.8 MG/DL (ref 4.4–7.6)
WBC # BLD AUTO: 6.4 10E3/UL (ref 4–11)

## 2022-10-05 PROCEDURE — 85025 COMPLETE CBC W/AUTO DIFF WBC: CPT | Mod: ZL | Performed by: PHYSICIAN ASSISTANT

## 2022-10-05 PROCEDURE — 86431 RHEUMATOID FACTOR QUANT: CPT | Mod: ZL | Performed by: PHYSICIAN ASSISTANT

## 2022-10-05 PROCEDURE — 86038 ANTINUCLEAR ANTIBODIES: CPT | Mod: ZL | Performed by: PHYSICIAN ASSISTANT

## 2022-10-05 PROCEDURE — 82374 ASSAY BLOOD CARBON DIOXIDE: CPT | Mod: ZL | Performed by: PHYSICIAN ASSISTANT

## 2022-10-05 PROCEDURE — G0463 HOSPITAL OUTPT CLINIC VISIT: HCPCS

## 2022-10-05 PROCEDURE — G0463 HOSPITAL OUTPT CLINIC VISIT: HCPCS | Mod: 25

## 2022-10-05 PROCEDURE — 73130 X-RAY EXAM OF HAND: CPT | Mod: 50

## 2022-10-05 PROCEDURE — 99214 OFFICE O/P EST MOD 30 MIN: CPT | Performed by: PHYSICIAN ASSISTANT

## 2022-10-05 PROCEDURE — 85652 RBC SED RATE AUTOMATED: CPT | Mod: ZL | Performed by: PHYSICIAN ASSISTANT

## 2022-10-05 PROCEDURE — 86140 C-REACTIVE PROTEIN: CPT | Mod: ZL | Performed by: PHYSICIAN ASSISTANT

## 2022-10-05 PROCEDURE — 84550 ASSAY OF BLOOD/URIC ACID: CPT | Mod: ZL | Performed by: PHYSICIAN ASSISTANT

## 2022-10-05 PROCEDURE — 36415 COLL VENOUS BLD VENIPUNCTURE: CPT | Mod: ZL | Performed by: PHYSICIAN ASSISTANT

## 2022-10-05 PROCEDURE — 72040 X-RAY EXAM NECK SPINE 2-3 VW: CPT

## 2022-10-05 PROCEDURE — 84443 ASSAY THYROID STIM HORMONE: CPT | Mod: ZL | Performed by: PHYSICIAN ASSISTANT

## 2022-10-05 ASSESSMENT — ANXIETY QUESTIONNAIRES
7. FEELING AFRAID AS IF SOMETHING AWFUL MIGHT HAPPEN: NOT AT ALL
6. BECOMING EASILY ANNOYED OR IRRITABLE: NOT AT ALL
GAD7 TOTAL SCORE: 0
2. NOT BEING ABLE TO STOP OR CONTROL WORRYING: NOT AT ALL
3. WORRYING TOO MUCH ABOUT DIFFERENT THINGS: NOT AT ALL
5. BEING SO RESTLESS THAT IT IS HARD TO SIT STILL: NOT AT ALL
GAD7 TOTAL SCORE: 0
4. TROUBLE RELAXING: NOT AT ALL
1. FEELING NERVOUS, ANXIOUS, OR ON EDGE: NOT AT ALL
GAD7 TOTAL SCORE: 0
IF YOU CHECKED OFF ANY PROBLEMS ON THIS QUESTIONNAIRE, HOW DIFFICULT HAVE THESE PROBLEMS MADE IT FOR YOU TO DO YOUR WORK, TAKE CARE OF THINGS AT HOME, OR GET ALONG WITH OTHER PEOPLE: NOT DIFFICULT AT ALL
8. IF YOU CHECKED OFF ANY PROBLEMS, HOW DIFFICULT HAVE THESE MADE IT FOR YOU TO DO YOUR WORK, TAKE CARE OF THINGS AT HOME, OR GET ALONG WITH OTHER PEOPLE?: NOT DIFFICULT AT ALL
7. FEELING AFRAID AS IF SOMETHING AWFUL MIGHT HAPPEN: NOT AT ALL

## 2022-10-05 ASSESSMENT — ENCOUNTER SYMPTOMS
ARTHRALGIAS: 1
FEVER: 1
DIARRHEA: 0
SHORTNESS OF BREATH: 0
NECK PAIN: 1
FREQUENCY: 0
CONSTIPATION: 0
HEMATURIA: 0
COUGH: 0
NAUSEA: 0
VOMITING: 0
MYALGIAS: 1
JOINT SWELLING: 1
EYE PAIN: 0
DYSURIA: 0
HEMATOCHEZIA: 0
SINUS PAIN: 0
CHILLS: 0
ABDOMINAL PAIN: 0
PSYCHIATRIC NEGATIVE: 1
SORE THROAT: 0
DIZZINESS: 0
WHEEZING: 0
LIGHT-HEADEDNESS: 0
PALPITATIONS: 0

## 2022-10-05 ASSESSMENT — PATIENT HEALTH QUESTIONNAIRE - PHQ9
10. IF YOU CHECKED OFF ANY PROBLEMS, HOW DIFFICULT HAVE THESE PROBLEMS MADE IT FOR YOU TO DO YOUR WORK, TAKE CARE OF THINGS AT HOME, OR GET ALONG WITH OTHER PEOPLE: NOT DIFFICULT AT ALL
SUM OF ALL RESPONSES TO PHQ QUESTIONS 1-9: 2
SUM OF ALL RESPONSES TO PHQ QUESTIONS 1-9: 2

## 2022-10-05 ASSESSMENT — PAIN SCALES - GENERAL: PAINLEVEL: MODERATE PAIN (4)

## 2022-10-05 NOTE — PROGRESS NOTES
Assessment & Plan   Problem List Items Addressed This Visit    None     Visit Diagnoses     Bilateral hand pain    -  Primary    Relevant Orders    CBC and Differential    Comprehensive Metabolic Panel    Sedimentation Rate (ESR)    Uric acid    Rheumatoid factor    TSH Reflex GH    Anti Nuclear Valeria IgG by IFA with Reflex    XR Hand Left G/E 3 Views    XR Hand Right G/E 3 Views    XR Cervical Spine 2/3 Views    CRP inflammation    Cervicalgia        Relevant Orders    XR Cervical Spine 2/3 Views    Muscle ache        Relevant Orders    CBC and Differential    Comprehensive Metabolic Panel    Sedimentation Rate (ESR)    Uric acid    Rheumatoid factor    TSH Reflex GH    Anti Nuclear Valeria IgG by IFA with Reflex    XR Hand Left G/E 3 Views    XR Hand Right G/E 3 Views    XR Cervical Spine 2/3 Views    CRP inflammation         Completed bilateral hand and cervical spine xray.  I personally reviewed the xray. I found no fracture appreciated upon initial read of xray.  Final read pending by radiology.    Completed lab work-up to rule out concerns.  Labs are pending.  Encourage good diet, exercise, stretching.  Can refer to orthopedic specialist to discuss her fifth digits, likely tendinosis.  May need to complete referral to rheumatology depending on lab results.  Can complete physical therapy or occupational referral if needed.  Encouraged to take ibuprofen for relief up to 4 times per day.  Encouraged rest and elevation.  Encouraged to use ice or heat 15 minutes at a time several times per day to decrease pain. Return to clinic in 1-2 weeks as necessary for persistent pain. Return to clinic with any change or worsening of symptoms.        See Patient Instructions    Return if symptoms worsen or fail to improve.    Ritika Contreras PA-C  [facility]    Subjective   Noemí is a 31 year old, presenting for the following health issues:  Arthritis      Arthritis  Associated symptoms include arthralgias, a  fever, joint swelling, myalgias and neck pain. Pertinent negatives include no abdominal pain, chest pain, chills, coughing, nausea, rash, sore throat or vomiting.   History of Present Illness       Reason for visit:  Joint pain stiffness  Symptoms include:  Stiffness pain swelling  Symptom intensity:  Moderate  Symptom progression:  Worsening  Had these symptoms before:  Yes  Has tried/received treatment for these symptoms:  No  What makes it worse:  N  What makes it better:  Ice ibuprofen rest    She eats 2-3 servings of fruits and vegetables daily.She consumes 2 sweetened beverage(s) daily.She exercises with enough effort to increase her heart rate 9 or less minutes per day.  She exercises with enough effort to increase her heart rate 3 or less days per week.   She is taking medications regularly.    Today's PHQ-9         PHQ-9 Total Score: 2    PHQ-9 Q9 Thoughts of better off dead/self-harm past 2 weeks :   Not at all    How difficult have these problems made it for you to do your work, take care of things at home, or get along with other people: Not difficult at all  Today's SUN-7 Score: 0       Pain History:  When did you first notice your pain? - Chronic Pain   Have you seen this provider for your pain in the past?   No   Where in your body do your have pain? joints  Are you seeing anyone else for your pain? No  What makes your pain better? Ice, squeezing hands   What makes your pain worse? na  How has pain affected your ability to work? Can work part time without limitations   What type of work do you or did you do? [employer]   Who lives in your household? Patient barby and 4 kids.     PHQ-9 SCORE 6/9/2021 10/6/2021 10/5/2022   PHQ-9 Total Score MyChart - 7 (Mild depression) 2 (Minimal depression)   PHQ-9 Total Score 0 7 2       SUN-7 SCORE 9/8/2021 10/6/2021 10/5/2022   Total Score 0 (minimal anxiety) 3 (minimal anxiety) 0 (minimal anxiety)   Total Score 0 3 0       Patient presents today for swelling  and joint pain.  Patient states that she has noticed pain and swelling in most of her joints in her hands, wrist, neck, toes, knees, and hips.  Started a couple years ago but has progressively gotten worse in the last year.    Patient states that her knuckles tend to be swollen and her pinkies are angled more flexed.  Increased hand pain off and on.  Had increased hand pain over the last week even holding a fork.  Uses ice and squeezing her hands to alleviate symptoms.  The inferior back of her neck hurts especially with pushing on it.  Has a bump that is painful daily.  No trauma.  Toe joints are painful.  Right lateral foot is numb at times.  Earlier this week the middle distal was numb.  Left knee feels like it gets stuck at times.  Left hip is painful at times.  Symptoms go up and down.  Some days she feels perfectly fine and then other days things flared greatly.  Nothing makes it worse.  Ice helps.  Using ibuprofen 600 mg 4-5 times per week.  Random low-grade fever up to 101 with no other symptoms.  Has had a low-grade fever 3 times in the last month.  Mother has history of gout.  No known family history of arthritic concerns.  No chest pain, palpitations, proximal breathing, GI or urinary symptoms.  No cough or cold symptoms.  No rashes.  No trauma or injury.    Review of Systems   Constitutional: Positive for fever. Negative for chills.   HENT: Negative for ear pain, sinus pain and sore throat.    Eyes: Negative for pain.   Respiratory: Negative for cough, shortness of breath and wheezing.    Cardiovascular: Negative for chest pain and palpitations.   Gastrointestinal: Negative for abdominal pain, constipation, diarrhea, hematochezia, nausea and vomiting.   Genitourinary: Negative for dysuria, frequency and hematuria.   Musculoskeletal: Positive for arthralgias, arthritis, joint swelling, myalgias and neck pain.   Skin: Negative for rash.   Neurological: Negative for dizziness and light-headedness.    Psychiatric/Behavioral: Negative.             Objective    /78 (BP Location: Right arm, Patient Position: Sitting, Cuff Size: Adult Regular)   Pulse 65   Temp 98.6  F (37  C) (Tympanic)   Resp 14   Wt 52.2 kg (115 lb)   LMP 09/21/2022 (Approximate)   SpO2 97%   Breastfeeding No   BMI 21.03 kg/m    Body mass index is 21.03 kg/m .  Physical Exam  Vitals and nursing note reviewed.   Constitutional:       Appearance: Normal appearance.   HENT:      Head: Normocephalic and atraumatic.   Eyes:      Extraocular Movements: Extraocular movements intact.      Conjunctiva/sclera: Conjunctivae normal.      Pupils: Pupils are equal, round, and reactive to light.   Cardiovascular:      Rate and Rhythm: Normal rate and regular rhythm.      Heart sounds: Normal heart sounds.   Pulmonary:      Effort: Pulmonary effort is normal.      Breath sounds: Normal breath sounds.   Abdominal:      General: Abdomen is flat. Bowel sounds are normal.      Palpations: Abdomen is soft.      Tenderness: There is no abdominal tenderness. There is no right CVA tenderness, left CVA tenderness, guarding or rebound.   Musculoskeletal:         General: Tenderness present. Normal range of motion.      Comments: Mild pain to palpation of the inferior portion of the cervical spine.  No paraspinous muscle pain to palpation.  No bruising or swelling appreciated.  Minimal pain to palpation of the finger joints.  Bilateral fifth digits are unable to complete full extension.  No foot pain to palpation.  No swelling or bruising appreciated on the joints.  No erythema or warmth appreciated.   Skin:     General: Skin is warm and dry.   Neurological:      General: No focal deficit present.      Mental Status: She is alert and oriented to person, place, and time.   Psychiatric:         Mood and Affect: Mood normal.         Behavior: Behavior normal.

## 2022-10-05 NOTE — NURSING NOTE
Pt presents to clinic today for swelling and joint pain. Patient states she has noticed pain and swelling in most of her joints in her hands, wrist, neck, toes, knee and hips. This started a couple years ago but progressively has gotten worse in the last year.      FOOD SECURITY SCREENING QUESTIONS:    The next two questions are to help us understand your food security.  If you are feeling you need any assistance in this area, we have resources available to support you today.    Hunger Vital Signs:  Within the past 12 months we worried whether our food would run out before we got money to buy more. Never  Within the past 12 months the food we bought just didn't last and we didn't have money to get more. Never            Medication Reconciliation: complete  Jazmin Briones LPN,LPN on 10/5/2022 at 3:34 PM

## 2022-10-05 NOTE — PATIENT INSTRUCTIONS
Encouraged to take ibuprofen for relief up to 4 times per day.  Encouraged rest and elevation.  Encouraged to use ice or heat 15 minutes at a time several times per day to decrease pain. Return to clinic in 1-2 weeks as necessary for persistent pain. Return to clinic with any change or worsening of symptoms.

## 2022-10-07 LAB — ANA SER QL IF: NEGATIVE

## 2022-10-31 NOTE — PROGRESS NOTES
Return OB Visit    S: Ms. Garcia is feeling well today. She has no acute concerns. Denies leaking of fluid, vaginal bleeding, painful contractions. Notes fetal movements.    O:   /70   Pulse 98   Wt 62.2 kg (137 lb 1.6 oz)   LMP 2020   BMI 24.29 kg/m      Gen: Well-appearing, NAD  See OB Flowsheet    FH 25 cm   bpm    Assessment:  Ms. Noemí Garcia is a 30 year old yo  here for OB follow up. She is currently 24w0d. Her pregnancy is complicated by personal history of Hodgkin's Lymphoma, ASCUS HPV+ pap smear, history of kidney stones in prior pregnancies.     Plan:     # Routine Prenatal Care  -- Datin week US MODESTO: 2021  -- PNLs: collected today including the following              A neg blood type: ab screen neg              RPR nr              Hep B S Ag neg              Hep C neg              Rubella Immune              HIV neg              ABO/Rh type              Antibody Screen              Urine culture: negative              GC/Chlam: neg     -- Genetic Screening: declines  -- Anatomy US: EFW 19%ile, CL 3.4 cm, MARQUITA 14.5cm, Normal anatomy              Follow up growth US at 28 weeks  -- Immunizations: Declines flu. Plan for Tdap at approximately 27 weeks gestation  -- 1 hr GTT: today  -- 3rd TM labs including CBC, RPR: Planned for next visit  -- GBS: Planned for 36 weeks  -- Postpartum Planning: Plans to try breastfeeding, interested in postpartum tubal  -- Delivery Planning: No indication for early IOL at this time. To be discussed continually  -- Planning to do at next visit: 28 week growth US, Tdap, CBC, RPR, Rhogam     # Rh negative (A neg)  -- Plan for Rhogam at 28 weeks     # History of Maternal Hodgkin's Lymphoma: s/p chemotherapy and surgery to remove groin lymph nodes     # History of previous hydronephrosis from kidney stones and nephrostomy tube placement during last two pregnancies     # Hematuria- resolved  -- Suspected recurrent kidney stone  -- Renal US 3/19  Called patients' daughter and updated her about the patients appointment day and time. Also discussed what she needs to do about her device as well.   shows mild dilation of left renal pelvis, no dilation on the right.   -- Creatinine 3/19: 0.61, 3/25: 0.53     # Abnormal pap  - Emerson performed 2/2/2021 with low-grade changes suspected at 12 o'clock position. As it is low-grade in appearance and she is pregnant, discussed repeat colposcopy, Pap and HPV testing postpartum.    NATALIE GREGG MD on 4/20/2021 at 8:28 AM

## 2022-12-08 ENCOUNTER — OFFICE VISIT (OUTPATIENT)
Dept: FAMILY MEDICINE | Facility: OTHER | Age: 32
End: 2022-12-08
Attending: NURSE PRACTITIONER
Payer: COMMERCIAL

## 2022-12-08 VITALS
HEART RATE: 86 BPM | RESPIRATION RATE: 20 BRPM | OXYGEN SATURATION: 97 % | WEIGHT: 114.6 LBS | TEMPERATURE: 100.7 F | SYSTOLIC BLOOD PRESSURE: 134 MMHG | HEIGHT: 62 IN | BODY MASS INDEX: 21.09 KG/M2 | DIASTOLIC BLOOD PRESSURE: 82 MMHG

## 2022-12-08 DIAGNOSIS — J10.1 INFLUENZA A: Primary | ICD-10-CM

## 2022-12-08 PROCEDURE — 99213 OFFICE O/P EST LOW 20 MIN: CPT | Performed by: NURSE PRACTITIONER

## 2022-12-08 PROCEDURE — G0463 HOSPITAL OUTPT CLINIC VISIT: HCPCS

## 2022-12-08 RX ORDER — OSELTAMIVIR PHOSPHATE 75 MG/1
75 CAPSULE ORAL 2 TIMES DAILY
Qty: 10 CAPSULE | Refills: 0 | Status: SHIPPED | OUTPATIENT
Start: 2022-12-08 | End: 2022-12-13

## 2022-12-08 RX ORDER — CODEINE PHOSPHATE AND GUAIFENESIN 10; 100 MG/5ML; MG/5ML
1-2 SOLUTION ORAL EVERY 6 HOURS PRN
Qty: 180 ML | Refills: 0 | Status: SHIPPED | OUTPATIENT
Start: 2022-12-08 | End: 2023-07-12

## 2022-12-08 ASSESSMENT — ENCOUNTER SYMPTOMS
FATIGUE: 1
ARTHRALGIAS: 1
FEVER: 1
WHEEZING: 0
NAUSEA: 1
COUGH: 1
SINUS PAIN: 1
PSYCHIATRIC NEGATIVE: 1
APPETITE CHANGE: 1
TROUBLE SWALLOWING: 1
SORE THROAT: 1
SHORTNESS OF BREATH: 0
HEADACHES: 1
EYES NEGATIVE: 1
ACTIVITY CHANGE: 1
CHILLS: 1
RHINORRHEA: 1

## 2022-12-08 ASSESSMENT — PAIN SCALES - GENERAL: PAINLEVEL: EXTREME PAIN (8)

## 2022-12-08 NOTE — PROGRESS NOTES
Noemí Garcia  1990      ASSESSMENT/PLAN:   1. Influenza A  She was exposed influenza A with her son, who tested positive on Monday.  Her symptoms started abruptly yesterday with classic body aches, chills, high fevers, decreased appetite, cough and sinus congestion.  She has a persistent sore throat.  We reviewed conservative treatment options including rest, staying high well-hydrated, Tylenol, ibuprofen, nondrowsy antihistamine, and Flonase nasal spray.  I do offer testing for influenza a, however given her high exposure and classic symptoms we both agree that proceeding with treatment with Tamiflu is reasonable in this situation.  Her high volumes of influenza A in her community, and we are low on media sources process influenza lab testing.  She feels comfortable with proceeding with Tamiflu.  Most, never side effects reviewed with her today.  Prescription has been sent.  I also sent in a prescription for Robitussin-AC to help with her cough/inability to sleep due to restlessness.  All questions were answered.  Patient feels comfortable with plan of care.  She knows return if her symptoms were to persist or not improved.  - oseltamivir (TAMIFLU) 75 MG capsule; Take 1 capsule (75 mg) by mouth 2 times daily for 5 days  Dispense: 10 capsule; Refill: 0  - guaiFENesin-codeine (ROBITUSSIN AC) 100-10 MG/5ML solution; Take 5-10 mLs by mouth every 6 hours as needed for cough  Dispense: 180 mL; Refill: 0    Patient agrees with plan of care and verbalizes understating. AVS printed. Patient education provided verbally and written instructions provided as requested. Patient made aware of emergent sings and symptoms to monitor for and when to seek additional care/follow up.     SUBJECTIVE:   CHIEF COMPLAINT/ REASON FOR VISIT  Patient presents with:  Influenza: Cough, body aches, fever, sinus congestion, nausea, vomiting     HISTORY OF PRESENT ILLNESS  Noemí Garcia is a pleasant 32 year old female presents to [facility]  "clinic today with exposure to influenza A.  Her  accompanies her.  Patient states that her son tested positive for influenza A 3 days ago on Monday, 12/5/2022.  Symptoms started yesterday.  She states she has significant chills, body aches, fever.  She has decreased appetite.  She has a persistent dry hacking cough.  She is feeling sinus pressure, sinus congestion, sore throat.  Does have some mild ear pressure.  She has been trying to stay hydrated with fluids.  She feels tired however feels unable to rest, and feels restless.  Her cough is also keeping her awake.    I have reviewed the nursing notes.  I have reviewed allergies, medication list, problem list, and past medical history.    REVIEW OF SYSTEMS  Review of Systems   Constitutional: Positive for activity change, appetite change, chills, fatigue and fever.   HENT: Positive for congestion, postnasal drip, rhinorrhea, sinus pain, sore throat and trouble swallowing. Negative for ear pain.    Eyes: Negative.    Respiratory: Positive for cough. Negative for shortness of breath and wheezing.    Gastrointestinal: Positive for nausea.   Genitourinary: Negative.    Musculoskeletal: Positive for arthralgias.   Neurological: Positive for headaches.   Psychiatric/Behavioral: Negative.       VITAL SIGNS  Vitals:    12/08/22 1109   BP: 134/82   Pulse: 86   Resp: 20   Temp: (!) 100.7  F (38.2  C)   TempSrc: Temporal   SpO2: 97%   Weight: 52 kg (114 lb 9.6 oz)   Height: 1.575 m (5' 2\")      Body mass index is 20.96 kg/m .    OBJECTIVE:   PHYSICAL EXAM  Physical Exam  Vitals reviewed.   Constitutional:       Appearance: Normal appearance. She is not ill-appearing or toxic-appearing.   HENT:      Head: Normocephalic and atraumatic.      Right Ear: Tympanic membrane, ear canal and external ear normal.      Left Ear: Tympanic membrane, ear canal and external ear normal.      Nose: Congestion and rhinorrhea present.      Right Sinus: Frontal sinus tenderness present.     "  Left Sinus: Frontal sinus tenderness present.      Mouth/Throat:      Lips: Pink.      Mouth: Mucous membranes are moist.      Pharynx: Posterior oropharyngeal erythema present. No oropharyngeal exudate.      Tonsils: No tonsillar exudate.   Eyes:      Conjunctiva/sclera: Conjunctivae normal.   Cardiovascular:      Rate and Rhythm: Normal rate and regular rhythm.      Pulses: Normal pulses.      Heart sounds: Normal heart sounds. No murmur heard.  Pulmonary:      Effort: Pulmonary effort is normal. No respiratory distress.      Breath sounds: Normal breath sounds. No wheezing.   Abdominal:      General: Bowel sounds are normal.      Palpations: Abdomen is soft.   Musculoskeletal:      Cervical back: Tenderness present.      Right lower leg: No edema.      Left lower leg: No edema.   Lymphadenopathy:      Cervical: No cervical adenopathy.   Skin:     Capillary Refill: Capillary refill takes less than 2 seconds.      Findings: No rash.   Neurological:      General: No focal deficit present.      Mental Status: She is alert and oriented to person, place, and time.   Psychiatric:         Mood and Affect: Mood normal.         Behavior: Behavior normal.         Thought Content: Thought content normal.         Judgment: Judgment normal.        DIAGNOSTICS  No results found for any visits on 12/08/22.     Cheryl Villanueva NP  [facility]

## 2022-12-08 NOTE — PATIENT INSTRUCTIONS
Flonase nasal spray, twice daily.  This is an intranasal steroid.  This will help with sinus congestion and postnasal drip.  Afrin nasal spray is also an option.  This is a little bit stronger. I would not recommend using this longer than 3 days at this and cause rebound congestion.  Alegra, claritin, zyertec   Stay well hydrated. Push water. If you need to flavor the water that is ok.  Caffeine does not help with dehydration, this actually worsens dehydration.  Avoid pop and coffee.  Adequate rest.  Your body needs time to recover to fight off this infection.  Tylenol and ibuprofen as directed.    Ibuprofen 600 mg every 6 hours as needed pain/headache or fever.  Acetaminophen, you can use regular strength or extra strength for fever or pain. Should you choose extra strength acetaminophen (Tylenol), that dosing is 1000 g every 8 hours as needed. Regular strength 650mg every 4 hours as needed.

## 2022-12-23 NOTE — PROGRESS NOTES
[facility] ADMINISTRATION    Pt presented to Nurse Injection room today for TB screening, as required by [employer]. Mantoux administered intradermal on LEFT  anterior forearm, per protocol. Pt has appropriate follow-up appointment scheduled for evaluation of injection site.    Nicole Pagan RN ....................  8/24/2022   3:13 PM       Stable

## 2023-03-13 ENCOUNTER — OFFICE VISIT (OUTPATIENT)
Dept: OBGYN | Facility: OTHER | Age: 33
End: 2023-03-13
Attending: STUDENT IN AN ORGANIZED HEALTH CARE EDUCATION/TRAINING PROGRAM
Payer: COMMERCIAL

## 2023-03-13 VITALS
DIASTOLIC BLOOD PRESSURE: 78 MMHG | SYSTOLIC BLOOD PRESSURE: 102 MMHG | WEIGHT: 110.6 LBS | BODY MASS INDEX: 20.23 KG/M2 | HEART RATE: 92 BPM

## 2023-03-13 DIAGNOSIS — R87.610 ASCUS WITH POSITIVE HIGH RISK HPV CERVICAL: Primary | ICD-10-CM

## 2023-03-13 DIAGNOSIS — N93.9 ABNORMAL UTERINE BLEEDING (AUB): ICD-10-CM

## 2023-03-13 DIAGNOSIS — R87.810 ASCUS WITH POSITIVE HIGH RISK HPV CERVICAL: Primary | ICD-10-CM

## 2023-03-13 LAB
ESTRADIOL SERPL-MCNC: 226 PG/ML
PROLACTIN SERPL 3RD IS-MCNC: 9 NG/ML (ref 5–23)
TSH SERPL DL<=0.005 MIU/L-ACNC: 2.21 UIU/ML (ref 0.3–4.2)

## 2023-03-13 PROCEDURE — 84146 ASSAY OF PROLACTIN: CPT | Mod: ZL | Performed by: STUDENT IN AN ORGANIZED HEALTH CARE EDUCATION/TRAINING PROGRAM

## 2023-03-13 PROCEDURE — G0463 HOSPITAL OUTPT CLINIC VISIT: HCPCS | Mod: 25

## 2023-03-13 PROCEDURE — 88142 CYTOPATH C/V THIN LAYER: CPT | Performed by: STUDENT IN AN ORGANIZED HEALTH CARE EDUCATION/TRAINING PROGRAM

## 2023-03-13 PROCEDURE — 36415 COLL VENOUS BLD VENIPUNCTURE: CPT | Mod: ZL | Performed by: STUDENT IN AN ORGANIZED HEALTH CARE EDUCATION/TRAINING PROGRAM

## 2023-03-13 PROCEDURE — 84443 ASSAY THYROID STIM HORMONE: CPT | Mod: ZL | Performed by: STUDENT IN AN ORGANIZED HEALTH CARE EDUCATION/TRAINING PROGRAM

## 2023-03-13 PROCEDURE — 87624 HPV HI-RISK TYP POOLED RSLT: CPT | Mod: ZL | Performed by: STUDENT IN AN ORGANIZED HEALTH CARE EDUCATION/TRAINING PROGRAM

## 2023-03-13 PROCEDURE — 82670 ASSAY OF TOTAL ESTRADIOL: CPT | Mod: ZL | Performed by: STUDENT IN AN ORGANIZED HEALTH CARE EDUCATION/TRAINING PROGRAM

## 2023-03-13 PROCEDURE — 99213 OFFICE O/P EST LOW 20 MIN: CPT | Mod: 25 | Performed by: STUDENT IN AN ORGANIZED HEALTH CARE EDUCATION/TRAINING PROGRAM

## 2023-03-13 PROCEDURE — 58100 BIOPSY OF UTERUS LINING: CPT | Performed by: STUDENT IN AN ORGANIZED HEALTH CARE EDUCATION/TRAINING PROGRAM

## 2023-03-13 PROCEDURE — 88305 TISSUE EXAM BY PATHOLOGIST: CPT

## 2023-03-13 NOTE — PROGRESS NOTES
Gynecology Office Visit    Chief Complaint: abnormal uterine bleeding    HPI:    Noemí Donohue is a 32 year old , here for discussion of abnormal uterine bleeding. Her periods are coming every 2 weeks and lasting approximately 11 days. She also has random spotting in between. She also has new cramping associated with them. At the heaviest she is soaking through a pap in less than an hour.  Prior to these last few months her periods have been very light and she is never really had associated cramping.  She denies any pain with intercourse, abnormal discharge, fevers, chills    She does endorse some irregular hot flashes.  She is not sure when her mother underwent menopause and she may not have undergone it yet.  She does have an older sister who has history of PCOS.      OBHx  OB History    Para Term  AB Living   5 4 4 0 1 4   SAB IAB Ectopic Multiple Live Births   1 0 0 0 4      # Outcome Date GA Lbr Sandeep/2nd Weight Sex Delivery Anes PTL Lv   5 Term 21 38w1d 02:59 / 00:06 2.903 kg (6 lb 6.4 oz) F Vag-Spont EPI N RADHA      Complications: Fetal Intolerance      Name: CHARANJIT,FEMALE-NOEMÍ      Apgar1: 8  Apgar5: 9   4 SAB 10/2020 5w0d          3 Term 16 37w0d 04:48 / 00:18 2.94 kg (6 lb 7.7 oz) F Vag-Spont EPI N RADHA      Name: BG NOEMÍ DONOHUE      Apgar1: 7  Apgar5: 9   2 Term 07/01/15 38w0d / 00:11 3.11 kg (6 lb 13.7 oz) F Vag-Spont EPI N RADHA      Name: Cammie       Apgar1: 9  Apgar5: 9   1 Term 13 40w3d 04:28 / 00:34 3.53 kg (7 lb 12.5 oz) M Vag-Spont Other, EPI N RADHA      Name: CHARANJITLIONEL HOPKINS      Apgar1: 7  Apgar5: 7     She is done with her childbearing goals and her 's had a vasectomy    GYN history:   No history of STIs   Pap smear history:  --2015 NILM HPV negative  --2021 ASCUS, HPV positive: Colposcopy performed in second trimester during pregnancy no biopsies were completed as she was currently pregnant.  --2021-postpartum after delivery of her  daughter.  LSIL HPV positive--recommended follow-up in 1 year which would have been September 2022  -- Repeat collected today    Past medical history:  Past Medical History:   Diagnosis Date     Abnormal Pap smear of cervix      Cancer (H)      Cerebral infarction (H)      Depressive disorder      Specifically denies VTE, DM, HTN or bleeding disorders    Past Surgical History:  Past Surgical History:   Procedure Laterality Date     AS REMOVE GROIN LYMPH NODES       BONE MARROW BIOPSY       COLONOSCOPY       COLPOSCOPY       COMBINED CYSTOSCOPY, URETEROSCOPY, LASER HOLMIUM LITHOTRIPSY URETER(S) Right 3/15/2022    Procedure: Right ureteroscopy with laser lithotripsy an stent placement;  Surgeon: Justo Jimenez MD;  Location: [facility]     EXTRACORPOREAL SHOCK WAVE LITHOTRIPSY, CYSTOSCOPY, INSERT STENT URETER(S), COMBINED       GALLBLADDER SURGERY       HEAD & NECK SURGERY      cyst removal     KIDNEY STONE SURGERY       RHINOPLASTY           Medications:  Current Outpatient Medications   Medication     clindamycin-benzoyl peroxide (BENZACLIN) 1-5 % external gel     guaiFENesin-codeine (ROBITUSSIN AC) 100-10 MG/5ML solution     ibuprofen (ADVIL/MOTRIN) 200 MG tablet     ondansetron (ZOFRAN-ODT) 4 MG ODT tab     ondansetron (ZOFRAN-ODT) 4 MG ODT tab     Prenatal Vit-Fe Fumarate-FA (PRENATAL MULTIVITAMIN W/IRON) 27-0.8 MG tablet     No current facility-administered medications for this visit.         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    Family History:  Family History   Problem Relation Age of Onset     No Known Problems Mother      Heart Disease Father         MIs     No Known Problems Sister      No Known Problems Sister      No Known Problems Brother      No Known Problems Brother      No Known Problems Brother      Alzheimer Disease Maternal Grandmother      Heart Disease Maternal Grandfather         MI or stroke?     Heart Disease Paternal Grandmother      Specifically denies breast, ovarian, colon, pancreatic cancers  Specifically denies VTE, known familial thrombophilias and coagulopathies    ROS:   Respiratory: No shortness of breath, dyspnea on exertion, cough, or hemoptysis  Cardiovascular: negative for palpitations, chest pain, lower extremity edema and syncope or near-syncope  Gastrointestinal: negative for, nausea, vomiting and hematemesis  Genitourinary: negative for, dysuria, frequency and urgency  Musculoskeletal: negative for, back pain and muscular weakness  Psychiatric: negative for, anxiety, depression and hallucinations  Hematologic/Lymphatic/Immunologic: negative for, anemia, chills and fever      Physical Exam  /78   Pulse 92   Wt 50.2 kg (110 lb 9.6 oz)   LMP 02/27/2023   BMI 20.23 kg/m    Gen: Well-appearing, no acute distressed, well-groomed, alert  HEENT: Normocephalic, atraumatic  Cardiovascular: Regular rate, No peripheral edema, normal peripheral circulation  Pulm: Symmetric chest rise, non-labored respirations  Abd: Soft, non-tender, non-distended  Ext: No LE edema, extremities warm and well perfused  Pelvic:  Normal appearing external female genitalia. Normal hair distribution. Vagina is without lesions with moist, pink ruggae. There is no vaginal discharge. Cervix parous, no lesions, no cervical motion tenderness.  Pap smear collected uterus is  approximately 8cm, mobile, non-tender. No adnexal tenderness or masses    Endometrial Biopsy Note:   We discussed the risks, benefits and alternatives to the procedure and Ms. Garcia was agreeable to proceed with the EMB. Consents were signed.  A bimanual exam was performed to reveal an anteverted uterus.  She was assisted into a lithotomy position and a speculum was gently inserted to provide visualization of the cervix.  The cervix was clearly visualized.  The cervix was cleaned with 3 Betadine swabs. The cervix was slightly dilated as she had just been bleeding and no tenaculum or os finder was needed. The biopsy cannula was then gently inserted through the already dilated cervix and advanced slowly to the uterine fundus.  The plunger was pulled and with suction applied a sample was collected on removal of the cannula in a twisting manner.  The tissue was placed in a specimen cup.     Assessment/Plan  Noemí Garcia is a 32 year old  female here for abnormal uterine bleeding, intermenstrual spotting and frequent menses.    #Abnormal uterine bleeding  -- Labs collected today include estradiol, TSH, prolactin  -- Pelvic ultrasound ordered today  -- Endometrial biopsy performed today  -- Discussed that possible etiologies for abnormal uterine bleed include uterine leiomyoma or endometrial polyps,  Cervical polyp, pelvic infection, alterations in hormone levels.  Work-up was started and all evidence will be compiled and called when results are back.  We discussed that if an endometrial polyp is suspected the best course of action would be to proceed with a hysteroscopy D&C and polypectomy.  We discussed that if everything returns as normal and she is interested in hormonal control to try to relieve the symptoms we could start with potentially an IUD as she has previously had prior bad interactions and side effects with systemic hormone control and contraception.  We will discuss all results in detail and see  what the neck steps she is interested in pursuing are.  -     #History of abnormal Pap smear  Pap smear history:  --August 2015 NILM HPV negative  --January 2021 ASCUS, HPV positive: Colposcopy performed in second trimester during pregnancy no biopsies were completed as she was currently pregnant.  --September 2021-postpartum after delivery of her daughter.  LSIL HPV positive--recommended follow-up in 1 year which would have been September 2022  -- Repeat collected today    Total amount of time spent during today's encounter including chart prep, face to face, documentation was 30-minute minutes.  5 minutes in addition were spent with the procedure.    Marilyn Deleon MD on 3/13/2023 at 1:29 PM

## 2023-03-13 NOTE — NURSING NOTE
Pt presents to clinic today for consult on abnormal uterine bleeding.      Medication Reconciliation: complete  Simin Hannah LPN

## 2023-03-15 LAB
PATH REPORT.COMMENTS IMP SPEC: NORMAL
PATH REPORT.FINAL DX SPEC: NORMAL
PHOTO IMAGE: NORMAL

## 2023-03-22 LAB
BKR LAB AP GYN ADEQUACY: NORMAL
BKR LAB AP GYN INTERPRETATION: NORMAL
BKR LAB AP HPV REFLEX: NORMAL
BKR LAB AP LMP: NORMAL
BKR LAB AP PREVIOUS ABNL DX: NORMAL
BKR LAB AP PREVIOUS ABNORMAL: NORMAL
PATH REPORT.COMMENTS IMP SPEC: NORMAL
PATH REPORT.COMMENTS IMP SPEC: NORMAL
PATH REPORT.RELEVANT HX SPEC: NORMAL

## 2023-03-27 ENCOUNTER — HOSPITAL ENCOUNTER (OUTPATIENT)
Dept: ULTRASOUND IMAGING | Facility: OTHER | Age: 33
Discharge: HOME | End: 2023-03-27
Attending: STUDENT IN AN ORGANIZED HEALTH CARE EDUCATION/TRAINING PROGRAM | Admitting: STUDENT IN AN ORGANIZED HEALTH CARE EDUCATION/TRAINING PROGRAM
Payer: COMMERCIAL

## 2023-03-27 DIAGNOSIS — N93.9 ABNORMAL UTERINE BLEEDING (AUB): ICD-10-CM

## 2023-03-27 PROCEDURE — 76856 US EXAM PELVIC COMPLETE: CPT

## 2023-04-12 ENCOUNTER — OFFICE VISIT (OUTPATIENT)
Dept: FAMILY MEDICINE | Facility: OTHER | Age: 33
End: 2023-04-12
Attending: NURSE PRACTITIONER
Payer: COMMERCIAL

## 2023-04-12 VITALS
HEART RATE: 79 BPM | OXYGEN SATURATION: 96 % | WEIGHT: 112 LBS | SYSTOLIC BLOOD PRESSURE: 104 MMHG | BODY MASS INDEX: 20.49 KG/M2 | DIASTOLIC BLOOD PRESSURE: 64 MMHG | RESPIRATION RATE: 20 BRPM | TEMPERATURE: 98.9 F

## 2023-04-12 DIAGNOSIS — R10.9 FLANK PAIN: ICD-10-CM

## 2023-04-12 DIAGNOSIS — R50.9 FEVER IN ADULT: ICD-10-CM

## 2023-04-12 DIAGNOSIS — R11.0 NAUSEA: ICD-10-CM

## 2023-04-12 DIAGNOSIS — J11.1 INFLUENZA-LIKE ILLNESS: Primary | ICD-10-CM

## 2023-04-12 LAB
ALBUMIN SERPL BCG-MCNC: 4.2 G/DL (ref 3.5–5.2)
ALBUMIN UR-MCNC: NEGATIVE MG/DL
ALP SERPL-CCNC: 71 U/L (ref 35–104)
ALT SERPL W P-5'-P-CCNC: 20 U/L (ref 10–35)
ANION GAP SERPL CALCULATED.3IONS-SCNC: 7 MMOL/L (ref 7–15)
APPEARANCE UR: CLEAR
AST SERPL W P-5'-P-CCNC: 20 U/L (ref 10–35)
BASOPHILS # BLD AUTO: 0 10E3/UL (ref 0–0.2)
BASOPHILS NFR BLD AUTO: 1 %
BILIRUB SERPL-MCNC: 0.7 MG/DL
BILIRUB UR QL STRIP: NEGATIVE
BUN SERPL-MCNC: 14.1 MG/DL (ref 6–20)
CALCIUM SERPL-MCNC: 9.3 MG/DL (ref 8.6–10)
CHLORIDE SERPL-SCNC: 104 MMOL/L (ref 98–107)
COLOR UR AUTO: NORMAL
CREAT SERPL-MCNC: 0.71 MG/DL (ref 0.51–0.95)
DEPRECATED HCO3 PLAS-SCNC: 27 MMOL/L (ref 22–29)
EOSINOPHIL # BLD AUTO: 0.1 10E3/UL (ref 0–0.7)
EOSINOPHIL NFR BLD AUTO: 2 %
ERYTHROCYTE [DISTWIDTH] IN BLOOD BY AUTOMATED COUNT: 11.9 % (ref 10–15)
FLUAV RNA SPEC QL NAA+PROBE: NEGATIVE
FLUBV RNA RESP QL NAA+PROBE: NEGATIVE
GFR SERPL CREATININE-BSD FRML MDRD: >90 ML/MIN/1.73M2
GLUCOSE SERPL-MCNC: 89 MG/DL (ref 70–99)
GLUCOSE UR STRIP-MCNC: NEGATIVE MG/DL
HCT VFR BLD AUTO: 41.4 % (ref 35–47)
HGB BLD-MCNC: 14 G/DL (ref 11.7–15.7)
HGB UR QL STRIP: NEGATIVE
HOLD SPECIMEN: NORMAL
IMM GRANULOCYTES # BLD: 0 10E3/UL
IMM GRANULOCYTES NFR BLD: 0 %
KETONES UR STRIP-MCNC: NEGATIVE MG/DL
LEUKOCYTE ESTERASE UR QL STRIP: NEGATIVE
LYMPHOCYTES # BLD AUTO: 1.3 10E3/UL (ref 0.8–5.3)
LYMPHOCYTES NFR BLD AUTO: 20 %
MCH RBC QN AUTO: 28.5 PG (ref 26.5–33)
MCHC RBC AUTO-ENTMCNC: 33.8 G/DL (ref 31.5–36.5)
MCV RBC AUTO: 84 FL (ref 78–100)
MONOCYTES # BLD AUTO: 0.4 10E3/UL (ref 0–1.3)
MONOCYTES NFR BLD AUTO: 6 %
NEUTROPHILS # BLD AUTO: 4.6 10E3/UL (ref 1.6–8.3)
NEUTROPHILS NFR BLD AUTO: 71 %
NITRATE UR QL: NEGATIVE
NRBC # BLD AUTO: 0 10E3/UL
NRBC BLD AUTO-RTO: 0 /100
PH UR STRIP: 7.5 [PH] (ref 5–9)
PLATELET # BLD AUTO: 248 10E3/UL (ref 150–450)
POTASSIUM SERPL-SCNC: 4.6 MMOL/L (ref 3.4–5.3)
PROT SERPL-MCNC: 7 G/DL (ref 6.4–8.3)
RBC # BLD AUTO: 4.91 10E6/UL (ref 3.8–5.2)
RSV RNA SPEC NAA+PROBE: NEGATIVE
SARS-COV-2 RNA RESP QL NAA+PROBE: NEGATIVE
SODIUM SERPL-SCNC: 138 MMOL/L (ref 136–145)
SP GR UR STRIP: 1.01 (ref 1–1.03)
UROBILINOGEN UR STRIP-MCNC: NORMAL MG/DL
WBC # BLD AUTO: 6.5 10E3/UL (ref 4–11)

## 2023-04-12 PROCEDURE — C9803 HOPD COVID-19 SPEC COLLECT: HCPCS | Performed by: NURSE PRACTITIONER

## 2023-04-12 PROCEDURE — 80053 COMPREHEN METABOLIC PANEL: CPT | Mod: ZL | Performed by: NURSE PRACTITIONER

## 2023-04-12 PROCEDURE — 36415 COLL VENOUS BLD VENIPUNCTURE: CPT | Mod: ZL | Performed by: NURSE PRACTITIONER

## 2023-04-12 PROCEDURE — 99214 OFFICE O/P EST MOD 30 MIN: CPT | Mod: CS | Performed by: NURSE PRACTITIONER

## 2023-04-12 PROCEDURE — G0463 HOSPITAL OUTPT CLINIC VISIT: HCPCS

## 2023-04-12 PROCEDURE — 85025 COMPLETE CBC W/AUTO DIFF WBC: CPT | Mod: ZL | Performed by: NURSE PRACTITIONER

## 2023-04-12 PROCEDURE — 87637 SARSCOV2&INF A&B&RSV AMP PRB: CPT | Mod: ZL | Performed by: NURSE PRACTITIONER

## 2023-04-12 PROCEDURE — 81003 URINALYSIS AUTO W/O SCOPE: CPT | Mod: ZL | Performed by: NURSE PRACTITIONER

## 2023-04-12 RX ORDER — ONDANSETRON 4 MG/1
4 TABLET, ORALLY DISINTEGRATING ORAL EVERY 8 HOURS PRN
Qty: 30 TABLET | Refills: 0 | Status: SHIPPED | OUTPATIENT
Start: 2023-04-12 | End: 2023-07-12

## 2023-04-12 ASSESSMENT — ENCOUNTER SYMPTOMS
RHINORRHEA: 1
APPETITE CHANGE: 1
ACTIVITY CHANGE: 1
HEADACHES: 0
SHORTNESS OF BREATH: 0
COUGH: 1
BACK PAIN: 1
DIZZINESS: 1
FATIGUE: 1
SORE THROAT: 1
ARTHRALGIAS: 1
WHEEZING: 0
CHILLS: 1
TROUBLE SWALLOWING: 1
GASTROINTESTINAL NEGATIVE: 1
FEVER: 1

## 2023-04-12 ASSESSMENT — PAIN SCALES - GENERAL: PAINLEVEL: SEVERE PAIN (6)

## 2023-04-12 NOTE — NURSING NOTE
"Chief Complaint   Patient presents with     Throat Problem     Sore throat 4-5 days  headache  left side pain  dizziness  muscle spasms       Medication reconciliation completed.    FOOD SECURITY SCREENING QUESTIONS:    The next two questions are to help us understand your food security.  If you are feeling you need any assistance in this area, we have resources available to support you today.    Hunger Vital Signs:  Within the past 12 months we worried whether our food would run out before we got money to buy more. Never  Within the past 12 months the food we bought just didn't last and we didn't have money to get more. Never    Initial /64 (BP Location: Right arm, Patient Position: Sitting, Cuff Size: Adult Regular)   Pulse 79   Temp 98.9  F (37.2  C) (Temporal)   Resp 20   Wt 50.8 kg (112 lb)   LMP 03/27/2023 (Approximate)   SpO2 96%   Breastfeeding No   BMI 20.49 kg/m   Estimated body mass index is 20.49 kg/m  as calculated from the following:    Height as of 12/8/22: 1.575 m (5' 2\").    Weight as of this encounter: 50.8 kg (112 lb).       Nimisha Last LPN .......  4/12/2023  9:15 AM  "

## 2023-04-12 NOTE — PROGRESS NOTES
Noemí Garcia  1990    ASSESSMENT/PLAN:   1. Influenza-like illness  2. Fever in adult  3. Flank pain  Patient presents with 4 to 5 days of influenza-like symptoms.  She is afebrile in clinic, vital signs within normal limits other than oxygen 96%.  Lungs are clear to auscultation, no wheezing.  On exam patient does have left CVA tenderness.  She is feeling nauseated and has generalized abdominal tenderness on exam.  No specific pain or tenderness.  She is not having any urinary symptoms.  With her left-sided flank pain recommend starting with urinalysis.  I also recommend obtaining testing for influenza, RSV, COVID-19 as her symptoms may be viral in nature.  We will also obtain CBC and CMP as patient is struggling with hydration and feeling dizziness.  During office visit patient is feeling dizzy and nauseated, and lays down on the table to rest.  Testing all returned reassuring, normal urinalysis, normal CBC, CMP and negative for influenza, RSV, COVID-19.  Review symptomatic management to help with viral illness and encourage hydration.  Zofran prescribed.  If symptoms are worsen or persist, pain were to worsen, she develop any wheezing or shortness of breath, unable to tolerate fluids or show signs of dehydration, she knows to return for reevaluation.  Patient feels comfortable with this plan and monitoring symptoms at home at this time.  - Symptomatic Influenza A/B, RSV, & SARS-CoV2 PCR (COVID-19) Nose  - Comprehensive Metabolic Panel  - CBC and Differential  - UA reflex to Microscopic and Culture    4. Nausea  Patient is feeling nauseated.  He is not vomiting.  No diarrhea.  Stressed the importance of staying well-hydrated.  Offered prescription for oral Zofran to help with nausea to promote rehydration.  Patient is agreeable.  - ondansetron (ZOFRAN ODT) 4 MG ODT tab; Take 1 tablet (4 mg) by mouth every 8 hours as needed for nausea  Dispense: 30 tablet; Refill: 0    Patient agrees with plan of care and  verbalizes understating. AVS printed. Patient education provided verbally and written instructions provided as requested. Patient made aware of emergent sings and symptoms to monitor for and when to seek additional care/follow up.     SUBJECTIVE:   CHIEF COMPLAINT/ REASON FOR VISIT  Patient presents with:  Throat Problem: Sore throat 4-5 days  headache  left side pain  dizziness  muscle spasms     HISTORY OF PRESENT ILLNESS  Noemí Garcia is a pleasant 32 year old female [facility] with concerns of sore throat and left lower back pain.  Patient states over the weekend she developed a sore throat, ear pain and developed muscle aches and spasms.  She feels her neck is sore.  She has had low-grade fever at home.  She has had some congestion and slight cough.  She is felt nauseous, no vomiting or diarrhea.  Denies headache but does feel dizzy.  He is experiencing left lower back strong bounding pain.  She is concerned she may have a kidney stone.  No history of kidney stones.  She denies any dysuria, urinary frequency or urgency.  Her last menstrual cycle was 3/27/2023 and finished about 1 week ago.  She is not on birth control,  has vasectomy.    I have reviewed the nursing notes.  I have reviewed allergies, medication list, problem list, and past medical history.    REVIEW OF SYSTEMS  Review of Systems   Constitutional: Positive for activity change, appetite change, chills, fatigue and fever.   HENT: Positive for congestion, ear pain, rhinorrhea, sore throat and trouble swallowing.    Respiratory: Positive for cough. Negative for shortness of breath and wheezing.    Gastrointestinal: Negative.    Genitourinary: Negative.    Musculoskeletal: Positive for arthralgias and back pain.   Neurological: Positive for dizziness. Negative for headaches.        VITAL SIGNS  Vitals:    04/12/23 0913   BP: 104/64   BP Location: Right arm   Patient Position: Sitting   Cuff Size: Adult Regular   Pulse: 79    Resp: 20   Temp: 98.9  F (37.2  C)   TempSrc: Temporal   SpO2: 96%   Weight: 50.8 kg (112 lb)      Body mass index is 20.49 kg/m .    OBJECTIVE:   PHYSICAL EXAM  Physical Exam  Vitals reviewed.   Constitutional:       Appearance: Normal appearance. She is ill-appearing. She is not toxic-appearing.   HENT:      Head: Normocephalic and atraumatic.      Right Ear: Tympanic membrane, ear canal and external ear normal.      Left Ear: Tympanic membrane, ear canal and external ear normal.      Nose: Congestion and rhinorrhea present.      Mouth/Throat:      Pharynx: No posterior oropharyngeal erythema.   Eyes:      Conjunctiva/sclera: Conjunctivae normal.   Cardiovascular:      Rate and Rhythm: Normal rate and regular rhythm.      Pulses: Normal pulses.      Heart sounds: Normal heart sounds. No murmur heard.  Pulmonary:      Effort: Pulmonary effort is normal. No respiratory distress.      Breath sounds: Normal breath sounds. No wheezing.   Abdominal:      General: There is no distension.      Tenderness: There is abdominal tenderness. There is left CVA tenderness. There is no right CVA tenderness.   Musculoskeletal:      Cervical back: Tenderness present.   Lymphadenopathy:      Cervical: Cervical adenopathy present.   Skin:     Capillary Refill: Capillary refill takes less than 2 seconds.      Findings: No rash.   Neurological:      General: No focal deficit present.      Mental Status: She is alert and oriented to person, place, and time.   Psychiatric:         Mood and Affect: Mood normal.         Behavior: Behavior normal.         Thought Content: Thought content normal.         Judgment: Judgment normal.        DIAGNOSTICS  Results for orders placed or performed in visit on 04/12/23   UA reflex to Microscopic and Culture     Status: Normal    Specimen: Urine, Clean Catch   Result Value Ref Range    Color Urine Light Yellow Colorless, Straw, Light Yellow, Yellow    Appearance Urine Clear Clear    Glucose Urine  Negative Negative mg/dL    Bilirubin Urine Negative Negative    Ketones Urine Negative Negative mg/dL    Specific Gravity Urine 1.014 1.000 - 1.030    Blood Urine Negative Negative    pH Urine 7.5 5.0 - 9.0    Protein Albumin Urine Negative Negative mg/dL    Urobilinogen Urine Normal Normal, 2.0 mg/dL    Nitrite Urine Negative Negative    Leukocyte Esterase Urine Negative Negative    Narrative    Microscopic not indicated   Symptomatic Influenza A/B, RSV, & SARS-CoV2 PCR (COVID-19) Nose     Status: Normal    Specimen: Nose; Swab   Result Value Ref Range    Influenza A PCR Negative Negative    Influenza B PCR Negative Negative    RSV PCR Negative Negative    SARS CoV2 PCR Negative Negative    Narrative    Testing was performed using the Xpert Xpress CoV2/Flu/RSV Assay on the Cepheid GeneXpert Instrument. This test should be ordered for the detection of SARS-CoV-2, influenza, and RSV viruses in individuals who meet clinical and/or epidemiological criteria. Test performance is unknown in asymptomatic patients. This test is for in vitro diagnostic use under the FDA EUA for laboratories certified under CLIA to perform high or moderate complexity testing. This test has not been FDA cleared or approved. A negative result does not rule out the presence of PCR inhibitors in the specimen or target RNA in concentration below the limit of detection for the assay. If only one viral target is positive but coinfection with multiple targets is suspected, the sample should be re-tested with another FDA cleared, approved, or authorized test, if coinfection would change clinical management. This test was validated by the [facility]. These laboratories are certified under the Clinical Laboratory Improvement Amendments of 1988 (CLIA-88) as qualified to perform high complexity laboratory testing.   Comprehensive Metabolic Panel     Status: Normal   Result Value Ref Range    Sodium 138 136 - 145 mmol/L    Potassium 4.6  3.4 - 5.3 mmol/L    Chloride 104 98 - 107 mmol/L    Carbon Dioxide (CO2) 27 22 - 29 mmol/L    Anion Gap 7 7 - 15 mmol/L    Urea Nitrogen 14.1 6.0 - 20.0 mg/dL    Creatinine 0.71 0.51 - 0.95 mg/dL    Calcium 9.3 8.6 - 10.0 mg/dL    Glucose 89 70 - 99 mg/dL    Alkaline Phosphatase 71 35 - 104 U/L    AST 20 10 - 35 U/L    ALT 20 10 - 35 U/L    Protein Total 7.0 6.4 - 8.3 g/dL    Albumin 4.2 3.5 - 5.2 g/dL    Bilirubin Total 0.7 <=1.2 mg/dL    GFR Estimate >90 >60 mL/min/1.73m2   CBC with platelets and differential     Status: None   Result Value Ref Range    WBC Count 6.5 4.0 - 11.0 10e3/uL    RBC Count 4.91 3.80 - 5.20 10e6/uL    Hemoglobin 14.0 11.7 - 15.7 g/dL    Hematocrit 41.4 35.0 - 47.0 %    MCV 84 78 - 100 fL    MCH 28.5 26.5 - 33.0 pg    MCHC 33.8 31.5 - 36.5 g/dL    RDW 11.9 10.0 - 15.0 %    Platelet Count 248 150 - 450 10e3/uL    % Neutrophils 71 %    % Lymphocytes 20 %    % Monocytes 6 %    % Eosinophils 2 %    % Basophils 1 %    % Immature Granulocytes 0 %    NRBCs per 100 WBC 0 <1 /100    Absolute Neutrophils 4.6 1.6 - 8.3 10e3/uL    Absolute Lymphocytes 1.3 0.8 - 5.3 10e3/uL    Absolute Monocytes 0.4 0.0 - 1.3 10e3/uL    Absolute Eosinophils 0.1 0.0 - 0.7 10e3/uL    Absolute Basophils 0.0 0.0 - 0.2 10e3/uL    Absolute Immature Granulocytes 0.0 <=0.4 10e3/uL    Absolute NRBCs 0.0 10e3/uL   Extra Tube     Status: None    Narrative    The following orders were created for panel order Extra Tube.  Procedure                               Abnormality         Status                     ---------                               -----------         ------                     Extra Serum Separator Tu...[[id number]]                      Final result                 Please view results for these tests on the individual orders.   Extra Serum Separator Tube (SST)     Status: None   Result Value Ref Range    Hold Specimen JIC    CBC and Differential     Status: None    Narrative    The following orders were created for  panel order CBC and Differential.  Procedure                               Abnormality         Status                     ---------                               -----------         ------                     CBC with platelets and d...[[id number]]                      Final result                 Please view results for these tests on the individual orders.        Cheryl Villanueva NP  [facility]

## 2023-06-04 ENCOUNTER — HEALTH MAINTENANCE LETTER (OUTPATIENT)
Age: 33
End: 2023-06-04

## 2023-07-11 DIAGNOSIS — L70.0 ACNE VULGARIS: ICD-10-CM

## 2023-07-12 RX ORDER — CLINDAMYCIN AND BENZOYL PEROXIDE 10; 50 MG/G; MG/G
GEL TOPICAL
Qty: 50 G | Refills: 1 | Status: SHIPPED | OUTPATIENT
Start: 2023-07-12

## 2023-07-28 ENCOUNTER — OFFICE VISIT (OUTPATIENT)
Dept: FAMILY MEDICINE | Facility: OTHER | Age: 33
End: 2023-07-28
Attending: NURSE PRACTITIONER
Payer: COMMERCIAL

## 2023-07-28 VITALS
OXYGEN SATURATION: 99 % | SYSTOLIC BLOOD PRESSURE: 110 MMHG | WEIGHT: 112 LBS | TEMPERATURE: 97.5 F | DIASTOLIC BLOOD PRESSURE: 70 MMHG | RESPIRATION RATE: 16 BRPM | HEIGHT: 62 IN | BODY MASS INDEX: 20.61 KG/M2

## 2023-07-28 DIAGNOSIS — N20.0 KIDNEY STONES: ICD-10-CM

## 2023-07-28 DIAGNOSIS — M54.50 ACUTE LEFT-SIDED LOW BACK PAIN, UNSPECIFIED WHETHER SCIATICA PRESENT: ICD-10-CM

## 2023-07-28 DIAGNOSIS — J02.9 ACUTE PHARYNGITIS, UNSPECIFIED ETIOLOGY: Primary | ICD-10-CM

## 2023-07-28 LAB
ALBUMIN UR-MCNC: 20 MG/DL
APPEARANCE UR: CLEAR
BILIRUB UR QL STRIP: NEGATIVE
COLOR UR AUTO: YELLOW
GLUCOSE UR STRIP-MCNC: 30 MG/DL
GROUP A STREP BY PCR: NOT DETECTED
HGB UR QL STRIP: NEGATIVE
KETONES UR STRIP-MCNC: 40 MG/DL
LEUKOCYTE ESTERASE UR QL STRIP: NEGATIVE
MUCOUS THREADS #/AREA URNS LPF: PRESENT /LPF
NITRATE UR QL: NEGATIVE
PH UR STRIP: 6 [PH] (ref 5–9)
RBC URINE: 1 /HPF
SP GR UR STRIP: 1.03 (ref 1–1.03)
SQUAMOUS EPITHELIAL: 8 /HPF
UROBILINOGEN UR STRIP-MCNC: NORMAL MG/DL
WBC URINE: 2 /HPF

## 2023-07-28 PROCEDURE — 99213 OFFICE O/P EST LOW 20 MIN: CPT

## 2023-07-28 PROCEDURE — 81001 URINALYSIS AUTO W/SCOPE: CPT | Mod: ZL

## 2023-07-28 PROCEDURE — 87651 STREP A DNA AMP PROBE: CPT | Mod: ZL

## 2023-07-28 PROCEDURE — G0463 HOSPITAL OUTPT CLINIC VISIT: HCPCS

## 2023-07-28 ASSESSMENT — ENCOUNTER SYMPTOMS: SORE THROAT: 1

## 2023-07-28 ASSESSMENT — PAIN SCALES - GENERAL: PAINLEVEL: SEVERE PAIN (6)

## 2023-07-28 NOTE — PROGRESS NOTES
ASSESSMENT/PLAN:    (J02.9) Acute pharyngitis, unspecified etiology  (primary encounter diagnosis)  Comment: Patient presents with a 2-day history of throat pain and a fever up to 103.  She denies any cough or rhinorrhea.  She does have some low back pain and joint pain.  On exam she is afebrile and bilateral breath sounds are clear.  Posterior pharynx with erythema and no exudates.  Strep testing today was negative.  I suspect this may be viral in nature and recommend symptomatic care at this time.  Follow-up is recommended if she has any persisting symptoms or concerns.  Plan: Group A Streptococcus PCR Throat Swab  May take over the counter analgesia such as Tylenol for pain or discomfort.  I also recommend salt water gargles, humidifier, throat lozenges if old enough not to be a choking hazard, warm honey if greater than 12 months in age, other home remedies as needed.   If changing or worsening symptoms such as: Worsening fevers, pain, inability to handle own secretions, etc., recommend follow-up.     (M54.50) Acute left-sided low back pain, unspecified whether sciatica present; (N20.0) Kidney stones  Comment: Patient has been having some lower back pain that is not migratory.  She does have a history of kidney stones.  On exam she does not have any CVA tenderness, she is afebrile.  UA is negative for hematuria.  As noted above she does have some fever and joint pains, I suspect her back pain may be related to viral illness with myalgias.  However, it is possible that she has a very small stone not causing hematuria.  I do recommend good fluid intake.  Follow-up if symptoms are worsening or persisting, or if at any point she has any concerns.  She notes she does have follow-up in the next 3 days with her PCP.  Plan: UA Macroscopic with reflex to Microscopic and         Culture    Discussed warning signs/symptoms indicative of need to f/u    Follow up if symptoms persist or worsen or concerns    I have reviewed  the nursing notes.  I have reviewed the findings, diagnosis, plan and need for follow up with the patient.    I explained my diagnostic considerations and recommendations to the patient, who voiced understanding and agreement with the treatment plan. All questions were answered. We discussed potential side effects of any prescribed or recommended therapies, as well as expectations for response to treatments.    VAMSI TREE MCCARTY, ANNE MARIE SANCHEZ  7/28/2023  10:17 AM    HPI:    Noemí Garcia is a 32 year old female  who presents to [facility] today for concerns of fever and sore throat.  She also has some joint pain and back pain.    Three days of sore throat, fever up to 103. She also has joint pain.  She denies cough and rhinorrhea. she has back pain. History of stone.  She denies any hematuria.  No urinary symptoms.    No known medication allergies.    PCP: Ben    Past Medical History:   Diagnosis Date    Abnormal Pap smear of cervix     Cancer (H)     Cerebral infarction (H)     Depressive disorder      Past Surgical History:   Procedure Laterality Date    AS REMOVE GROIN LYMPH NODES      BONE MARROW BIOPSY      COLONOSCOPY      COLPOSCOPY      COMBINED CYSTOSCOPY, URETEROSCOPY, LASER HOLMIUM LITHOTRIPSY URETER(S) Right 3/15/2022    Procedure: Right ureteroscopy with laser lithotripsy an stent placement;  Surgeon: Justo Jimenez MD;  Location: [facility] OR    EXTRACORPOREAL SHOCK WAVE LITHOTRIPSY, CYSTOSCOPY, INSERT STENT URETER(S), COMBINED      GALLBLADDER SURGERY      HEAD & NECK SURGERY      cyst removal    KIDNEY STONE SURGERY      RHINOPLASTY       Social History     Tobacco Use    Smoking status: Former     Types: Vaping Device     Passive exposure: Past    Smokeless tobacco: Never    Tobacco comments:     12/23/20: 1-2 per day   Substance Use Topics    Alcohol use: Not Currently     Current Outpatient Medications   Medication Sig Dispense Refill    clindamycin-benzoyl peroxide (BENZACLIN) 1-5 % external gel Apply  "topically 2 times daily 50 g 1     Allergies   Allergen Reactions    Adhesive Tape Other (See Comments)     Blistered skin     Past medical history, past surgical history, current medications and allergies reviewed and accurate to the best of my knowledge.      ROS:  Refer to HPI    /70 (BP Location: Right arm)   Temp 97.5  F (36.4  C) (Tympanic)   Resp 16   Ht 1.575 m (5' 2\")   Wt 50.8 kg (112 lb)   LMP 07/18/2023   SpO2 99%   BMI 20.49 kg/m      EXAM:  General Appearance: Well appearing 32 year old female, appropriate appearance for age. No acute distress   Ears: Left TM intact, translucent with bony landmarks appreciated, no erythema, no effusion, no bulging, no purulence.  Right TM intact, partially obscured by cerumen, translucent with bony landmarks appreciated, no erythema, no effusion, no bulging, no purulence.  Left auditory canal clear.  Right auditory canal clear.  Normal external ears, non tender.  Eyes: conjunctivae normal without erythema or irritation, corneas clear, no drainage or crusting, no eyelid swelling, pupils equal   Oropharynx: moist mucous membranes, posterior pharynx with erythema, no exudates or petechiae, no post nasal drip seen, no trismus, voice clear.    Sinuses:  No sinus tenderness upon palpation of the frontal or maxillary sinuses  Nose:  Bilateral nares: no erythema, no edema, no drainage or congestion   Neck: supple without adenopathy  Respiratory: normal chest wall and respirations.  Normal effort.  Clear to auscultation bilaterally, no wheezing, crackles or rhonchi.  No increased work of breathing.  No cough appreciated.  Cardiac: RRR with no murmurs  Abdomen: soft, nontender, no rigidity, no rebound tenderness or guarding, normal bowel sounds present  :  No suprapubic tenderness to palpation.  Absent CVA tenderness to palpation.    Musculoskeletal:  Equal movement of bilateral upper extremities.  Equal movement of bilateral lower extremities.  Normal gait.  Mild " left-sided back pain along paralumbar muscle.  Dermatological: no rashes noted of exposed skin  Neuro: Alert and oriented to person, place, and time.  Cranial nerves II-XII grossly intact with no focal or lateralizing deficits.  Muscle tone normal.  Gait normal. No tremor.   Psychological: normal affect, alert, oriented, and pleasant.     Labs:  Results for orders placed or performed in visit on 07/28/23   UA Macroscopic with reflex to Microscopic and Culture     Status: Abnormal    Specimen: Urine, Midstream   Result Value Ref Range    Color Urine Yellow Colorless, Straw, Light Yellow, Yellow    Appearance Urine Clear Clear    Glucose Urine 30 (A) Negative mg/dL    Bilirubin Urine Negative Negative    Ketones Urine 40 (A) Negative mg/dL    Specific Gravity Urine 1.029 1.000 - 1.030    Blood Urine Negative Negative    pH Urine 6.0 5.0 - 9.0    Protein Albumin Urine 20 (A) Negative mg/dL    Urobilinogen Urine Normal Normal, 2.0 mg/dL    Nitrite Urine Negative Negative    Leukocyte Esterase Urine Negative Negative    Mucus Urine Present (A) None Seen /LPF    RBC Urine 1 <=2 /HPF    WBC Urine 2 <=5 /HPF    Squamous Epithelials Urine 8 (H) <=1 /HPF    Narrative    Urine Culture not indicated   Group A Streptococcus PCR Throat Swab     Status: Normal    Specimen: Throat; Swab   Result Value Ref Range    Group A strep by PCR Not Detected Not Detected    Narrative    The Xpert Xpress Strep A test, performed on the [employer], is a rapid, qualitative in vitro diagnostic test for the detection of Streptococcus pyogenes (Group A ß-hemolytic Streptococcus, Strep A) in throat swab specimens from patients with signs and symptoms of pharyngitis. The Xpert Xpress Strep A test can be used as an aid in the diagnosis of Group A Streptococcal pharyngitis. The assay is not intended to monitor treatment for Group A Streptococcus infections. The Xpert Xpress Strep A test utilizes an automated real-time polymerase chain  reaction (PCR) to detect Streptococcus pyogenes DNA.

## 2023-07-28 NOTE — NURSING NOTE
Patient presents with sore throat , kidney pain and feer with swellon joints x 3 days.  Cha Bolivar LPN.........................7/28/2023  9:34 AM

## 2023-07-28 NOTE — PATIENT INSTRUCTIONS
May take over the counter analgesia such as Tylenol for pain or discomfort.  I also recommend salt water gargles, humidifier, throat lozenges if old enough not to be a choking hazard, warm honey if greater than 12 months in age, other home remedies as needed.   If changing or worsening symptoms such as: Worsening fevers, pain, inability to handle own secretions, etc., recommend follow-up.

## 2023-07-28 NOTE — PROGRESS NOTES
"      Dwight Dowd is a 32 year old, presenting for the following health issues:  Pharyngitis (Patient presents with sore throat, fever, kidney pain and swellon joints x 3 days.)      Pharyngitis            Review of Systems   HENT:  Positive for sore throat.           Objective    /70 (BP Location: Right arm)   Temp 97.5  F (36.4  C) (Tympanic)   Resp 16   Ht 1.575 m (5' 2\")   Wt 50.8 kg (112 lb)   LMP 07/18/2023   SpO2 99%   BMI 20.49 kg/m    Body mass index is 20.49 kg/m .  Physical Exam             "

## 2023-08-17 NOTE — PROGRESS NOTES
[facility] ADMISSION NOTE    Patient admitted to room 314 at approximately 2130 via wheel chair from emergency room. Patient was accompanied by significant other.     Verbal SBAR report received from NIA Puckett prior to patient arrival.     Patient ambulated to bed independently. Patient alert and oriented X 3. Pain is controlled without any medications.  . Admission vital signs: Blood pressure 104/66, pulse 69, temperature 98.5  F (36.9  C), temperature source Tympanic, resp. rate 16, weight 53.8 kg (118 lb 8 oz), last menstrual period 11/08/2021, SpO2 96 %, unknown if currently breastfeeding. Patient was oriented to plan of care, call light, bed controls, tv, telephone, bathroom and visiting hours.     Risk Assessment    The following safety risks were identified during admission: none. Yellow risk band applied: YES.     Skin Initial Assessment    This writer admitted this patient and completed a full skin assessment and Moises score in the Adult PCS flowsheet. Appropriate interventions initiated as needed.         Yesica Hoyos RN     Initiate Treatment: clotrimazole-betamethasone 1 %-0.05 % topical cream BID\\n\\ndesonide 0.05 % topical cream Render In Strict Bullet Format?: No Detail Level: Generalized

## 2023-12-04 ENCOUNTER — THERAPY VISIT (OUTPATIENT)
Dept: CHIROPRACTIC MEDICINE | Facility: OTHER | Age: 33
End: 2023-12-04
Attending: CHIROPRACTOR
Payer: COMMERCIAL

## 2023-12-04 VITALS — OXYGEN SATURATION: 97 % | HEART RATE: 91 BPM | TEMPERATURE: 97.5 F | RESPIRATION RATE: 16 BRPM

## 2023-12-04 DIAGNOSIS — M99.02 SEGMENTAL AND SOMATIC DYSFUNCTION OF THORACIC REGION: Primary | ICD-10-CM

## 2023-12-04 DIAGNOSIS — M54.2 DORSALGIA OF CERVICOTHORACIC REGION: ICD-10-CM

## 2023-12-04 DIAGNOSIS — M99.01 SEGMENTAL AND SOMATIC DYSFUNCTION OF CERVICAL REGION: ICD-10-CM

## 2023-12-04 DIAGNOSIS — M54.13 RADICULOPATHY OF CERVICOTHORACIC REGION: ICD-10-CM

## 2023-12-04 DIAGNOSIS — M54.6 DORSALGIA OF CERVICOTHORACIC REGION: ICD-10-CM

## 2023-12-04 DIAGNOSIS — G54.0 TOS (THORACIC OUTLET SYNDROME): ICD-10-CM

## 2023-12-04 PROCEDURE — 99203 OFFICE O/P NEW LOW 30 MIN: CPT | Mod: 25 | Performed by: CHIROPRACTOR

## 2023-12-04 PROCEDURE — 98940 CHIROPRACT MANJ 1-2 REGIONS: CPT | Mod: AT | Performed by: CHIROPRACTOR

## 2023-12-04 PROCEDURE — 97140 MANUAL THERAPY 1/> REGIONS: CPT | Mod: 59 | Performed by: CHIROPRACTOR

## 2023-12-04 NOTE — PROGRESS NOTES
Flared up about a week ago. Medial upper back and neck with constant burning. 7/10 W24 8/10. Patient states that the numbness and tingling starts at the fingertips and goes up to elbow. Using stretches with a decrease in discomfort.    Left lower hip with intermittent aching.  0/10 W24 0/10. Radiates down left leg down into mid thigh.   Ines Johnson on 12/4/2023 at 11:40 AM    Reviewed by CC.    PATIENT:  Noemí Garcia is a 33 year old female presenting for neck and upper back pain that is described as a constant burning.  Currently she rates the pain 7/10 with pain ratings as high as 8/10 in the last 24 hours.  She also experiences numbness and tingling in the fingertips that is felt all the way up to the elbows.  Some stretching will decrease discomfort.  Patient states that normally this is worse when at work.  Pain and radiation are both worse on the right.  Patient is right-handed.  She denies any injury or trauma that would have precipitated this flareup about a week ago.  She states that she has had similar symptoms in the past including hand swelling.  Patient has had extensive laboratory workup in the past without any consistent or significant findings.  Patient was diagnosed with lymphoma 9 years ago.  She was treated with chemo successfully.  Patient states that since she has had chemo she will occasionally spike low-grade fevers.  During chemo she did have tingling in the fingers however that has gone away and felt different than what she currently has.  Patient does not regularly follow-up for blood tests with oncology however she does have blood test performed somewhat frequently for when she has flareups of other symptoms.  Patient also had a lymph node or gland removed from the right side of her neck.  She does report some right-sided trigeminal symptoms occasionally because of that.  She is unsure if it may have affected her radicular symptoms in her hands.  Other previous history that was taken  into consideration, history of Hodgkin's lymphoma at multiple sites, previous drug addiction and overdose, as well as cerebral infarction as a result of drug use.  Patient reports no significant increases in stress recently other than recently buying a house and work.  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  She will routinely report headaches across the forehead.      PROBLEM:   Date of Initial Visit for this Episode:  12/4/2023    Visit #1    (DVPRS) Pain Rating Score : 0-No pain (W24 0/10) (12/04/23 1141)    See flowsheets in chart for details.  12/4/2023 12/4/2023    11:34 AM   Neck Disability Index (  Rodolfo H. and Tommy C. 1991. All rights reserved.; used with permission)   SECTION 1 - PAIN INTENSITY 1   SECTION 2 - PERSONAL CARE 0   SECTION 3 - LIFTING 0   SECTION 4 - READING 3   SECTION 5 - HEADACHES 3   SECTION 6 - CONCENTRATION 0   SECTION 7 - WORK 0   SECTION 8 - DRIVING 2   SECTION 9 - SLEEPING 3   SECTION 10 - RECREATION 1   Count 10   Sum 13   Raw Score: /50 13   Neck Disability Index Score: (%) 26 %      Oswestry ([facility]) Questionnaire        12/4/2023    11:36 AM   OSWESTRY DISABILITY INDEX   Count 9   Sum 8   Oswestry Score (%) 17.78 %        KeeleSTART Back Sub score 2 Total score 3    Sleeping habits: Patient sleeps on her back with a very thin pillow only on her neck and head.    Past D.C. Care: Once many years ago.       PAST MEDICAL HISTORY:  Past Medical History:   Diagnosis Date    Abnormal Pap smear of cervix     Cancer (H)     Cerebral infarction (H)     Depressive disorder        PAST SURGICAL HISTORY:  Past Surgical History:   Procedure Laterality Date    AS REMOVE GROIN LYMPH NODES      BONE MARROW BIOPSY      COLONOSCOPY      COLPOSCOPY      COMBINED CYSTOSCOPY, URETEROSCOPY, LASER HOLMIUM LITHOTRIPSY URETER(S) Right 3/15/2022    Procedure: Right ureteroscopy with laser lithotripsy an stent placement;  Surgeon: Justo Jimenez MD;  Location:  OR    EXTRACORPOREAL SHOCK WAVE  LITHOTRIPSY, CYSTOSCOPY, INSERT STENT URETER(S), COMBINED      GALLBLADDER SURGERY      HEAD & NECK SURGERY      cyst removal    KIDNEY STONE SURGERY      RHINOPLASTY         ALLERGIES:  Allergies   Allergen Reactions    Adhesive Tape Other (See Comments)     Blistered skin       CURRENT MEDICATIONS:  Current Outpatient Medications   Medication Sig Dispense Refill    clindamycin-benzoyl peroxide (BENZACLIN) 1-5 % external gel Apply topically 2 times daily 50 g 1       SOCIAL HISTORY:  Social History     Socioeconomic History    Marital status: Single   Tobacco Use    Smoking status: Former     Types: Vaping Device     Passive exposure: Past    Smokeless tobacco: Never    Tobacco comments:     12/23/20: 1-2 per day   Vaping Use    Vaping Use: Former    Substances: Nicotine, Flavoring    Devices: Pre-filled pod   Substance and Sexual Activity    Alcohol use: Not Currently    Drug use: Not Currently     Types: Methamphetamines     Comment: October 2019: over 2 years clean    Sexual activity: Yes     Partners: Male   Social History Narrative    Originally from [place], [place]    Moved to this area in 10/2020 with boyfriend        FAMILY HISTORY:  Family History   Problem Relation Age of Onset    No Known Problems Mother     Heart Disease Father         MIs    No Known Problems Sister     No Known Problems Sister     No Known Problems Brother     No Known Problems Brother     No Known Problems Brother     Alzheimer Disease Maternal Grandmother     Heart Disease Maternal Grandfather         MI or stroke?    Heart Disease Paternal Grandmother        Patient Active Problem List   Diagnosis    Hodgkin's disease of lymph nodes of multiple sites (H)    Anal fissure    Bipolar II disorder, moderate, depressed, with anxious distress (H)    Deviated nasal septum    Generalized anxiety disorder    Kidney stones    Renal colic    History of lymphoma    ASCUS with positive high risk HPV cervical    Postpartum endometritis     "Submandibular abscess    Biliary colic    Drug overdose    Overdose of antidepressant         ROS:  The patient denies any fevers, chills, nausea, vomiting, diarrhea, constipation,dysuria, hematuria, or urinary hesitancy or incontinence.  No shortness of breath, chest pain, or rashes.    OBJECTIVE:    DIAGNOSTICS:  Cervical xray 2/3 views 10/5/2022  PROCEDURE: XR CERVICAL SPINE 2/3 VIEWS     HISTORY: Bilateral hand pain; Bilateral hand pain; Cervicalgia; Muscle  ache     COMPARISON: None.     TECHNIQUE: 3 views of the cervical spine were obtained.     FINDINGS: No acute fracture or subluxation is seen. Alignment is  maintained. The odontoid is intact. The disk spaces are maintained.                                                                         IMPRESSION: Negative radiographs of the cervical spine.      OMAIRA SUNSHINE MD     PHYSICAL EXAM:   Pulse 91   Temp 97.5  F (36.4  C) (Tympanic)   Resp 16   SpO2 97%    GENERAL APPEARANCE: healthy, alert, and no distress   GAIT: NORMAL  SKIN: no suspicious lesions or rashes  NEURO: cranial nerves 2-12 intact, normal gait, symmetric and equal DTRs,  strength is normal and equal bilaterally, wrist flexion decreased on the right, elbow flexion decreased on the right compared to the left.  Light touch sensation below the elbow feels \"different and lighter\" on the right that it does in the left.    PSYCH:  mentation appears normal and affect normal/bright    MUSCULOSKELETAL:   Posture: Anterior head carriage, rounded shoulders.  Upper cross posture  Gait:  unremarkable.     Active cervical range of motion is painful and flexion with an increase in upper back pain at 15 degrees, extension at 40 degrees increases neck pain, lateral flexion and rotation mildly restricted with increases in cervical pain.    Maximal Foraminal Compression: Positive for relief of symptoms   Shoulder Depression: Very tight bilaterally  Cervical distraction improves    Santoshon's, kimmy [place] " all positive for TOS like symptoms, right worse than left.  Radial pulse was rarely present on right other than when patient had lowered her right shoulder.    Active thoracic range of motion is restricted in flexion extension.  Thoracic compression negative for pain    +Tenderness: Tenderness palpated in the mid cervical spine C4-C6, tenderness bilaterally in the pectoralis minor, first rib bilaterally, scalenes bilaterally.  +Muscle hypertonicity bilaterally in the cervical paravertebrals, upper trapezius, levator, pectoral muscles  +Joint asymmetry and restriction: Mid to lower cervical spine C5-C6, T2, T5, T10, bilateral first rib restriction    ASSESSMENT: Noemí Garcia is a 33 year old female with a somewhat complicated history that has been extensively evaluated in the past during previous episodes with laboratory blood work as well as x-rays of the cervical spine.  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  This appears to be worse on the right than the left.  Because of the patient's history with Hodgkin's lymphoma, blood workup may be warranted with patient's primary.  She has a visit with Ritika Contreras next Monday.  I plan on reaching out to the primary to determine her thoughts.  Consider MRI imaging for possible differential of cervical disc.       1. Segmental and somatic dysfunction of thoracic region    2. Segmental and somatic dysfunction of cervical region    3. TOS (thoracic outlet syndrome)    4. Radiculopathy of cervicothoracic region    5. Dorsalgia of cervicothoracic region        PLAN    Evaluation and Management:  14336 Moderate exam established patient 20 min  Total time spent on the date of this encounter, including chart review, history/exam and documentation: 35 mins       Procedures:  Modalities:  52980: Manual therapy, supine cervical traction, myofascial release of upper trapezius and cervical paravertebrals, treatment time 8  minutes    CMT:  17957 Chiropractic manipulative treatment 1-2 regions performed   Thoracic: Light diversified, T2, T5, T10 prone  Activator right first rib supine  Time spent on CMT: 5 mins     Therapeutic procedures:  Patient was shown ulnar, median and radial nerve flossing techniques, posture relief positions.    Response to Treatment  Some reduction in symptoms as reported by patient with an improvement in mid back, neck and some pain in the arms improved.    Prognosis: Guarded    12/4/2023 Plan of Care:  6-8 visits of Chiropractic Care including Spinal Adjustments and/or physiotherapy and active rehabilitation, to include exercises in the office and/or at home to meet care plan goals.     Frequency: 2xweek for up to 4 weeks. A reevaluation would be clinically appropriate in 6-8 visits, to determine progress and further course of care.  Likely continuation of care will be needed as TOS symptoms typically are slow to respond.  Will consider ordering MRI to further diagnose, referral to physical therapy.    POC discussed and patient agreeable to plan of care.      12/4/2023 Goals:      Goals of care include decreasing patient's pain intensity and frequency, decreasing headache frequency, return patient to normal functioning status.  Decreased Oswestry and NDI scores by 25% in 4 weeks.      INSTRUCTIONS   ice 20 minutes every other hour as needed and stretch as instructed at visit    Follow-up:  Return to care later this week.

## 2023-12-08 ENCOUNTER — THERAPY VISIT (OUTPATIENT)
Dept: CHIROPRACTIC MEDICINE | Facility: OTHER | Age: 33
End: 2023-12-08
Attending: CHIROPRACTOR
Payer: COMMERCIAL

## 2023-12-08 VITALS
OXYGEN SATURATION: 100 % | DIASTOLIC BLOOD PRESSURE: 70 MMHG | HEART RATE: 72 BPM | SYSTOLIC BLOOD PRESSURE: 126 MMHG | TEMPERATURE: 97.3 F | RESPIRATION RATE: 16 BRPM

## 2023-12-08 DIAGNOSIS — M99.02 SEGMENTAL AND SOMATIC DYSFUNCTION OF THORACIC REGION: Primary | ICD-10-CM

## 2023-12-08 DIAGNOSIS — G54.0 TOS (THORACIC OUTLET SYNDROME): ICD-10-CM

## 2023-12-08 DIAGNOSIS — M54.2 DORSALGIA OF CERVICOTHORACIC REGION: ICD-10-CM

## 2023-12-08 DIAGNOSIS — M99.01 SEGMENTAL AND SOMATIC DYSFUNCTION OF CERVICAL REGION: ICD-10-CM

## 2023-12-08 DIAGNOSIS — M54.6 DORSALGIA OF CERVICOTHORACIC REGION: ICD-10-CM

## 2023-12-08 DIAGNOSIS — M54.13 RADICULOPATHY OF CERVICOTHORACIC REGION: ICD-10-CM

## 2023-12-08 PROCEDURE — 98940 CHIROPRACT MANJ 1-2 REGIONS: CPT | Mod: AT | Performed by: CHIROPRACTOR

## 2023-12-08 NOTE — PROGRESS NOTES
Medial back/spine is constant, burning 6/10 W24 8/10 heat has worked better then cold packs. Numbness has improved since last visit..  Hayley Stacey on 12/8/2023 at 9:28 AM    Reviewed by CC.    Visit #:  2    Subjective:  Noemí Garcia is a 33 year old female who is seen in f/u up for:       Segmental and somatic dysfunction of thoracic region  Segmental and somatic dysfunction of cervical region  TOS (thoracic outlet syndrome)  Radiculopathy of cervicothoracic region  Dorsalgia of cervicothoracic region.     Since last visit on 12/4/2023,  Noemí Garcia Reports that she still is having mid upper back constant burning pain.  She has used heat to relieve some symptoms.  She states that since last visit she has had greatly improved numbness into her hands stating that she has only had some numbness on the right from the elbow into the fourth and fifth digit.  Numbness on the left has relieved.  Patient still rates upper back and overall discomfort 6/10 currently and 8/10 in the last 24 hours.  Patient goes back to work tonight.    (DVPRS) Pain Rating Score : 6-Hard to ignore, avoid usual activities (W24 8/10) (12/08/23 0921)     Objective:  The following was observed:  /70 (BP Location: Right arm, Patient Position: Sitting, Cuff Size: Adult Regular)   Pulse 72   Temp 97.3  F (36.3  C) (Tympanic)   Resp 16   SpO2 100%      P: palpatory tendernessLevator scapulae, T-spine paraspinal, Traps, and CT junction right upper thoracic spine:    A: static palpation demonstrates intersegmental asymmetry   R: motion palpation notes restricted motion  T: hypertonicity at: Upper trapezius, levator bilaterally thoracic paraspinals left and right scalenes right over left     Segmental spinal dysfunction/restrictions found at:  :  C7 Right lateral flexion restricted and Extension restriction  T3 Right rotation restricted, Left lateral flexion restricted, and Extension restriction  T7 Extension restriction.      Assessment:  Patient is shown some early progress.  The true test to symptom improvement will be this week when she is working longer shifts at night with higher levels of stress.  Patient has a follow-up visit with her primary provider on Monday.  Recommend to follow-up early next week    Diagnoses:      1. Segmental and somatic dysfunction of thoracic region    2. Segmental and somatic dysfunction of cervical region    3. TOS (thoracic outlet syndrome)    4. Radiculopathy of cervicothoracic region    5. Dorsalgia of cervicothoracic region        Patient's condition:  Patient had restrictions pre-manipulation    Treatment effectiveness:  Post manipulation there is better intersegmental movement and Patient claims to feel looser post manipulation      Procedures:  CMT:  12601 Chiropractic manipulative treatment 1-2 regions performed   Thoracic: Diversified, T3, T7, Prone    Therapeutic procedures:  58393: Manual therapy, cervical manual traction supine, myofascial release upper trapezius, scalenes, levator and thoracic paraspinals.  Treatment time 8 minutes.      Response to Treatment  Reduction in symptoms as reported by patient    Prognosis: Good    Progress towards Goals: Patient is making progress towards the goal.     Recommendations:    Instructions: Continue to utilize heat but only 15 minutes/h.  Continue to perform stretches given    Follow-up: Follow-up early next week

## 2023-12-11 ENCOUNTER — THERAPY VISIT (OUTPATIENT)
Dept: CHIROPRACTIC MEDICINE | Facility: OTHER | Age: 33
End: 2023-12-11
Attending: CHIROPRACTOR
Payer: COMMERCIAL

## 2023-12-11 ENCOUNTER — OFFICE VISIT (OUTPATIENT)
Dept: FAMILY MEDICINE | Facility: OTHER | Age: 33
End: 2023-12-11
Attending: PHYSICIAN ASSISTANT
Payer: COMMERCIAL

## 2023-12-11 VITALS
TEMPERATURE: 97.4 F | WEIGHT: 115 LBS | HEART RATE: 70 BPM | SYSTOLIC BLOOD PRESSURE: 120 MMHG | DIASTOLIC BLOOD PRESSURE: 60 MMHG | HEIGHT: 62 IN | RESPIRATION RATE: 20 BRPM | BODY MASS INDEX: 21.16 KG/M2 | OXYGEN SATURATION: 98 %

## 2023-12-11 VITALS
DIASTOLIC BLOOD PRESSURE: 76 MMHG | OXYGEN SATURATION: 97 % | RESPIRATION RATE: 16 BRPM | SYSTOLIC BLOOD PRESSURE: 128 MMHG | TEMPERATURE: 96 F | HEART RATE: 78 BPM

## 2023-12-11 DIAGNOSIS — M54.6 DORSALGIA OF CERVICOTHORACIC REGION: ICD-10-CM

## 2023-12-11 DIAGNOSIS — M79.10 MYALGIA: ICD-10-CM

## 2023-12-11 DIAGNOSIS — M54.13 RADICULOPATHY OF CERVICOTHORACIC REGION: ICD-10-CM

## 2023-12-11 DIAGNOSIS — M99.01 SEGMENTAL AND SOMATIC DYSFUNCTION OF CERVICAL REGION: ICD-10-CM

## 2023-12-11 DIAGNOSIS — M99.02 SEGMENTAL AND SOMATIC DYSFUNCTION OF THORACIC REGION: Primary | ICD-10-CM

## 2023-12-11 DIAGNOSIS — M54.2 DORSALGIA OF CERVICOTHORACIC REGION: ICD-10-CM

## 2023-12-11 DIAGNOSIS — M54.2 CERVICALGIA: ICD-10-CM

## 2023-12-11 DIAGNOSIS — R20.2 NUMBNESS AND TINGLING OF RIGHT ARM: Primary | ICD-10-CM

## 2023-12-11 DIAGNOSIS — G54.0 TOS (THORACIC OUTLET SYNDROME): ICD-10-CM

## 2023-12-11 DIAGNOSIS — R20.0 NUMBNESS AND TINGLING OF RIGHT ARM: Primary | ICD-10-CM

## 2023-12-11 DIAGNOSIS — M99.04 SEGMENTAL AND SOMATIC DYSFUNCTION OF SACRAL REGION: ICD-10-CM

## 2023-12-11 LAB
ALBUMIN SERPL BCG-MCNC: 4.8 G/DL (ref 3.5–5.2)
ALP SERPL-CCNC: 61 U/L (ref 40–150)
ALT SERPL W P-5'-P-CCNC: 24 U/L (ref 0–50)
ANION GAP SERPL CALCULATED.3IONS-SCNC: 9 MMOL/L (ref 7–15)
AST SERPL W P-5'-P-CCNC: 20 U/L (ref 0–45)
BASOPHILS # BLD AUTO: 0 10E3/UL (ref 0–0.2)
BASOPHILS NFR BLD AUTO: 0 %
BILIRUB DIRECT SERPL-MCNC: <0.2 MG/DL (ref 0–0.3)
BILIRUB SERPL-MCNC: 0.6 MG/DL
BUN SERPL-MCNC: 16.2 MG/DL (ref 6–20)
CALCIUM SERPL-MCNC: 10 MG/DL (ref 8.6–10)
CHLORIDE SERPL-SCNC: 102 MMOL/L (ref 98–107)
CREAT SERPL-MCNC: 0.71 MG/DL (ref 0.51–0.95)
CRP SERPL-MCNC: <3 MG/L
DEPRECATED HCO3 PLAS-SCNC: 28 MMOL/L (ref 22–29)
EGFRCR SERPLBLD CKD-EPI 2021: >90 ML/MIN/1.73M2
EOSINOPHIL # BLD AUTO: 0.2 10E3/UL (ref 0–0.7)
EOSINOPHIL NFR BLD AUTO: 3 %
ERYTHROCYTE [DISTWIDTH] IN BLOOD BY AUTOMATED COUNT: 12.2 % (ref 10–15)
ERYTHROCYTE [SEDIMENTATION RATE] IN BLOOD BY WESTERGREN METHOD: 1 MM/HR (ref 0–20)
FERRITIN SERPL-MCNC: 75 NG/ML (ref 6–175)
GLUCOSE SERPL-MCNC: 92 MG/DL (ref 70–99)
HCT VFR BLD AUTO: 44.7 % (ref 35–47)
HGB BLD-MCNC: 15.3 G/DL (ref 11.7–15.7)
IMM GRANULOCYTES # BLD: 0 10E3/UL
IMM GRANULOCYTES NFR BLD: 0 %
IRON BINDING CAPACITY (ROCHE): 258 UG/DL (ref 240–430)
IRON SATN MFR SERPL: 35 % (ref 15–46)
IRON SERPL-MCNC: 91 UG/DL (ref 37–145)
LYMPHOCYTES # BLD AUTO: 1.7 10E3/UL (ref 0.8–5.3)
LYMPHOCYTES NFR BLD AUTO: 30 %
MCH RBC QN AUTO: 29.1 PG (ref 26.5–33)
MCHC RBC AUTO-ENTMCNC: 34.2 G/DL (ref 31.5–36.5)
MCV RBC AUTO: 85 FL (ref 78–100)
MONOCYTES # BLD AUTO: 0.4 10E3/UL (ref 0–1.3)
MONOCYTES NFR BLD AUTO: 8 %
NEUTROPHILS # BLD AUTO: 3.3 10E3/UL (ref 1.6–8.3)
NEUTROPHILS NFR BLD AUTO: 59 %
NRBC # BLD AUTO: 0 10E3/UL
NRBC BLD AUTO-RTO: 0 /100
PLATELET # BLD AUTO: 265 10E3/UL (ref 150–450)
POTASSIUM SERPL-SCNC: 4.1 MMOL/L (ref 3.4–5.3)
PROT SERPL-MCNC: 7.7 G/DL (ref 6.4–8.3)
RBC # BLD AUTO: 5.26 10E6/UL (ref 3.8–5.2)
RETICS # AUTO: 0.05 10E6/UL (ref 0.03–0.1)
RETICS/RBC NFR AUTO: 0.9 % (ref 0.5–2)
SODIUM SERPL-SCNC: 139 MMOL/L (ref 135–145)
TSH SERPL DL<=0.005 MIU/L-ACNC: 1.48 UIU/ML (ref 0.3–4.2)
WBC # BLD AUTO: 5.7 10E3/UL (ref 4–11)

## 2023-12-11 PROCEDURE — 86431 RHEUMATOID FACTOR QUANT: CPT | Mod: ZL | Performed by: PHYSICIAN ASSISTANT

## 2023-12-11 PROCEDURE — 86140 C-REACTIVE PROTEIN: CPT | Mod: ZL | Performed by: PHYSICIAN ASSISTANT

## 2023-12-11 PROCEDURE — 36415 COLL VENOUS BLD VENIPUNCTURE: CPT | Mod: ZL | Performed by: PHYSICIAN ASSISTANT

## 2023-12-11 PROCEDURE — 85652 RBC SED RATE AUTOMATED: CPT | Mod: ZL | Performed by: PHYSICIAN ASSISTANT

## 2023-12-11 PROCEDURE — 84443 ASSAY THYROID STIM HORMONE: CPT | Mod: ZL | Performed by: PHYSICIAN ASSISTANT

## 2023-12-11 PROCEDURE — 85025 COMPLETE CBC W/AUTO DIFF WBC: CPT | Mod: ZL | Performed by: PHYSICIAN ASSISTANT

## 2023-12-11 PROCEDURE — 99213 OFFICE O/P EST LOW 20 MIN: CPT | Performed by: PHYSICIAN ASSISTANT

## 2023-12-11 PROCEDURE — 82248 BILIRUBIN DIRECT: CPT | Mod: ZL | Performed by: PHYSICIAN ASSISTANT

## 2023-12-11 PROCEDURE — 82728 ASSAY OF FERRITIN: CPT | Mod: ZL | Performed by: PHYSICIAN ASSISTANT

## 2023-12-11 PROCEDURE — 83550 IRON BINDING TEST: CPT | Mod: ZL | Performed by: PHYSICIAN ASSISTANT

## 2023-12-11 PROCEDURE — 85045 AUTOMATED RETICULOCYTE COUNT: CPT | Mod: ZL | Performed by: PHYSICIAN ASSISTANT

## 2023-12-11 PROCEDURE — 82310 ASSAY OF CALCIUM: CPT | Mod: ZL | Performed by: PHYSICIAN ASSISTANT

## 2023-12-11 PROCEDURE — 86038 ANTINUCLEAR ANTIBODIES: CPT | Mod: ZL | Performed by: PHYSICIAN ASSISTANT

## 2023-12-11 PROCEDURE — 82306 VITAMIN D 25 HYDROXY: CPT | Mod: ZL | Performed by: PHYSICIAN ASSISTANT

## 2023-12-11 PROCEDURE — 98940 CHIROPRACT MANJ 1-2 REGIONS: CPT | Mod: AT | Performed by: CHIROPRACTOR

## 2023-12-11 ASSESSMENT — PAIN SCALES - GENERAL: PAINLEVEL: SEVERE PAIN (7)

## 2023-12-11 NOTE — PATIENT INSTRUCTIONS
Encouraged to take ibuprofen for relief up to 4 times per day.  Encouraged rest and elevation.  Encouraged to use ice or heat 15 minutes at a time several times per day to decrease pain. Return to clinic in 1-2 weeks as necessary for persistent pain. Return to clinic with any change or worsening of symptoms.     Encouraged to use a tennis elbow armband two inches below the right elbow.    Call for an orthopedics consult if needed.

## 2023-12-11 NOTE — PROGRESS NOTES
Assessment & Plan   Problem List Items Addressed This Visit    None  Visit Diagnoses       Numbness and tingling of right arm    -  Primary    Relevant Orders    MR Cervical Spine w/o Contrast    CBC with Platelets & Differential    Basic metabolic panel (Completed)    TSH with free T4 reflex (Completed)    Vitamin D Deficiency (Completed)    Ferritin (Completed)    Iron & Iron Binding Capacity (Completed)    Anti Nuclear Valeria IgG by IFA with Reflex (Completed)    Rheumatoid factor (Completed)    CRP inflammation (Completed)    Erythrocyte sedimentation rate auto (Completed)    Adult Neurology  Referral    Lab Blood Morphology Pathologist Review (Completed)    Hepatic Function Panel (Completed)    Cervicalgia        Relevant Orders    MR Cervical Spine w/o Contrast    CBC with Platelets & Differential    Basic metabolic panel (Completed)    TSH with free T4 reflex (Completed)    Vitamin D Deficiency (Completed)    Ferritin (Completed)    Iron & Iron Binding Capacity (Completed)    Anti Nuclear Valeria IgG by IFA with Reflex (Completed)    Rheumatoid factor (Completed)    CRP inflammation (Completed)    Erythrocyte sedimentation rate auto (Completed)    Adult Neurology  Referral    Lab Blood Morphology Pathologist Review (Completed)    Hepatic Function Panel (Completed)    Myalgia        Relevant Orders    MR Cervical Spine w/o Contrast    CBC with Platelets & Differential    Basic metabolic panel (Completed)    TSH with free T4 reflex (Completed)    Vitamin D Deficiency (Completed)    Ferritin (Completed)    Iron & Iron Binding Capacity (Completed)    Anti Nuclear Valeria IgG by IFA with Reflex (Completed)    Rheumatoid factor (Completed)    CRP inflammation (Completed)    Erythrocyte sedimentation rate auto (Completed)    Adult Neurology  Referral    Lab Blood Morphology Pathologist Review (Completed)    Hepatic Function Panel (Completed)           Discussed symptoms and concerns at length.   Ordered MRI of the cervical spine to rule out concerns.  Also complete a thorough lab workup to rule out concerns.  Referred for a right upper extremity EMG to rule out concerns.    Encouraged to take ibuprofen for relief up to 4 times per day.  Encouraged rest and elevation.  Encouraged to use ice or heat 15 minutes at a time several times per day to decrease pain. Return to clinic in 1-2 weeks as necessary for persistent pain. Return to clinic with any change or worsening of symptoms.     Encouraged to use a tennis elbow armband two inches below the right elbow.    Call for an orthopedics consult if needed.     Ordering of each unique test  30 minutes spent by me on the date of the encounter doing chart review, history and exam, documentation and further activities per the note       See Patient Instructions    Return if symptoms worsen or fail to improve.    Ritika Contreras PA-C  [facility] AND [facility]    Subjective   Noemí is a 33 year old, presenting for the following health issues:  Wrist Pain (Was having pain on top of hands and some numbness)      History of Present Illness       Back Pain:  She presents for follow up of back pain. Patient's back pain is a chronic problem.  Location of back pain:  Right middle of back, left middle of back, right upper back, left upper back, right side of neck, left side of neck, right shoulder and other  Description of back pain: burning  Back pain spreads: right side of neck and left side of neck    Since patient first noticed back pain, pain is: always present, but gets better and worse  Does back pain interfere with her job:  No       She eats 2-3 servings of fruits and vegetables daily.She consumes 3 sweetened beverage(s) daily.She exercises with enough effort to increase her heart rate 10 to 19 minutes per day.  She exercises with enough effort to increase her heart rate 4 days per week.   She is taking medications regularly.     Patient is coming today with  "persistent symptoms near her elbow.  Patient has pain on the back of her right elbow that radiates down to her hand.  Worse with washing dishes, using her arms, and holding the phone.  Whole arm goes numb at times.  Feels like it is a weird sensation similar to when she has hit her funny bone.  Chiropractor has questioned thoracic outlet syndrome.  Has a burning pain that goes down her spine between the shoulder blades.  Using ice and heat.  Heat is helping better.  No lower back concerns.  No bowel or bladder incontinence.  Symptoms are more central around the elbow as compared to the hand.  No swelling or bruising.      Review of Systems   Constitutional, HEENT, cardiovascular, pulmonary, gi and gu systems are negative, except as otherwise noted.      Objective    /60   Pulse 70   Temp 97.4  F (36.3  C) (Tympanic)   Resp 20   Ht 1.575 m (5' 2\")   Wt 52.2 kg (115 lb)   LMP 12/04/2023   SpO2 98%   BMI 21.03 kg/m    Body mass index is 21.03 kg/m .  Physical Exam  Vitals and nursing note reviewed.   Constitutional:       Appearance: Normal appearance.   HENT:      Head: Normocephalic and atraumatic.   Musculoskeletal:         General: No swelling, tenderness or signs of injury. Normal range of motion.      Cervical back: Normal range of motion.      Comments: No ulnar groove pain with palpation.  Negative Tinel sign.  Full range of motion of upper extremities with minimal discomfort.   Skin:     General: Skin is warm and dry.   Neurological:      General: No focal deficit present.      Mental Status: She is alert and oriented to person, place, and time.      Motor: No weakness.      Coordination: Coordination normal.      Gait: Gait normal.      Deep Tendon Reflexes: Reflexes normal.   Psychiatric:         Mood and Affect: Mood normal.         Behavior: Behavior normal.                          "

## 2023-12-11 NOTE — NURSING NOTE
"Chief Complaint   Patient presents with    Wrist Pain     Was having pain on top of hands and some numbness       Initial /60   Pulse 70   Temp 97.4  F (36.3  C) (Tympanic)   Resp 20   Ht 1.575 m (5' 2\")   Wt 52.2 kg (115 lb)   LMP 12/04/2023   SpO2 98%   BMI 21.03 kg/m   Estimated body mass index is 21.03 kg/m  as calculated from the following:    Height as of this encounter: 1.575 m (5' 2\").    Weight as of this encounter: 52.2 kg (115 lb).  Medication Review: complete    The next two questions are to help us understand your food security.  If you are feeling you need any assistance in this area, we have resources available to support you today.          12/11/2023   SDOH- Food Insecurity   Within the past 12 months, did you worry that your food would run out before you got money to buy more? N   Within the past 12 months, did the food you bought just not last and you didn t have money to get more? N         Health Care Directive:  Patient does not have a Health Care Directive or Living Will: Discussed advance care planning with patient; however, patient declined at this time.    Estefani Puentes, MARCON      "

## 2023-12-11 NOTE — PROGRESS NOTES
Medial back/spine is constant burning 7/10 W24 8/10 pain is radiating into both side of shoulders using cold and hot packs had provided some comfort with pain.  Hayley Ibarra on 12/11/2023 at 1:26 PM    Reviewed by CC.    Visit #:  3    Subjective:  Noemí Garcia is a 33 year old female who is seen in f/u up for:        Segmental and somatic dysfunction of thoracic region  Segmental and somatic dysfunction of cervical region  TOS (thoracic outlet syndrome)  Radiculopathy of cervicothoracic region  Dorsalgia of cervicothoracic region.     Since last visit on 12/8/2023,  Noemí Garcia reports: Symptoms again improved after last visit.  Radiating symptoms are still present distal to the right elbow into the fourth and fifth digit.  She states that work this weekend did significantly increase symptoms again with constant burning felt into her shoulders and upper back.  Patient is also reporting left-sided hip and pelvic pain with left foot numbness intermittently.    (DVPRS) Pain Rating Score : 7-Focus of attention, prevents doing daily activities (W24 8/10) (12/11/23 1319)     Objective:  The following was observed:  /76 (BP Location: Right arm, Patient Position: Sitting, Cuff Size: Adult Regular)   Pulse 78   Temp (!) 96  F (35.6  C) (Tympanic)   Resp 16   SpO2 97%      P: palpatory tenderness left gluteal, bilateral TFL, bilateral thoracic paraspinals, right over left upper trapezius and levator, CT junction.    A: static palpation demonstrates intersegmental asymmetry   R: motion palpation notes restricted motion  T: hypertonicity at: Gluteal, Levator scapulae, T-spine paraspinal, TFL, Traps, and hamstrings:  Bilaterally      Seated slump test and straight leg raise are negative on the left, significant hypertonicity in the glutes and hamstrings on testing  Nachlas and Yeomans negative  PA advancement positive for restriction at the left SI joint and sacrum    Segmental spinal dysfunction/restrictions  found at:  :  T2 Left rotation restricted, Right lateral flexion restricted, and Extension restriction  T5 Right lateral flexion restricted and Extension restriction  Sacrum Left lateral flexion restricted and Extension restriction.      Assessment: Symptoms continue to improve posttreatment.  Work is a large contributing factor to the radiculopathy of the cervical thoracic region.  Upper back pain continues with work.  Patient is also describing left-sided lower back and hip pain with intermittent tingling into the foot.  I believe that it is muscular in nature as she has exhibits a lot of hypertonicity in the gluteal and hamstring region.  Plan to treat sacrum next visit.    Diagnoses:      1. Segmental and somatic dysfunction of thoracic region    2. Segmental and somatic dysfunction of cervical region    3. TOS (thoracic outlet syndrome)    4. Radiculopathy of cervicothoracic region    5. Dorsalgia of cervicothoracic region        Patient's condition:  Patient had restrictions pre-manipulation    Treatment effectiveness:  Post manipulation there is better intersegmental movement and Patient claims to feel looser post manipulation      Procedures:  CMT:  89949 Chiropractic manipulative treatment 1-2 regions performed   Thoracic: Diversified, T2, T5, Prone    34805, manual therapy, manual traction supine cervical spine with myofascial release of the left gluteal, bilateral upper trapezius and levator and cervical paraspinals.  Treatment time 5 minutes    Was shown stretches and exercises including corner pec stretch, hamstring and gluteal seated stretch.    Response to Treatment  Reduction in symptoms as reported by patient    Prognosis: Good    Progress towards Goals: Patient is making progress towards the goal.     Recommendations:    Instructions: Patient is advised to utilize heat or ice, maintain mobility and perform stretches shown    Follow-up: Follow-up later this week

## 2023-12-12 LAB
RHEUMATOID FACT SERPL-ACNC: <10 IU/ML
VIT D+METAB SERPL-MCNC: 25 NG/ML (ref 20–50)

## 2023-12-13 ENCOUNTER — THERAPY VISIT (OUTPATIENT)
Dept: CHIROPRACTIC MEDICINE | Facility: OTHER | Age: 33
End: 2023-12-13
Attending: CHIROPRACTOR
Payer: COMMERCIAL

## 2023-12-13 VITALS — HEART RATE: 63 BPM | TEMPERATURE: 96.7 F | RESPIRATION RATE: 20 BRPM | OXYGEN SATURATION: 98 %

## 2023-12-13 DIAGNOSIS — M99.01 SEGMENTAL AND SOMATIC DYSFUNCTION OF CERVICAL REGION: ICD-10-CM

## 2023-12-13 DIAGNOSIS — M54.42 BILATERAL LOW BACK PAIN WITH LEFT-SIDED SCIATICA, UNSPECIFIED CHRONICITY: ICD-10-CM

## 2023-12-13 DIAGNOSIS — M99.02 SEGMENTAL AND SOMATIC DYSFUNCTION OF THORACIC REGION: Primary | ICD-10-CM

## 2023-12-13 DIAGNOSIS — M99.04 SEGMENTAL AND SOMATIC DYSFUNCTION OF SACRAL REGION: ICD-10-CM

## 2023-12-13 DIAGNOSIS — M54.2 DORSALGIA OF CERVICOTHORACIC REGION: ICD-10-CM

## 2023-12-13 DIAGNOSIS — G54.0 TOS (THORACIC OUTLET SYNDROME): ICD-10-CM

## 2023-12-13 DIAGNOSIS — M54.6 DORSALGIA OF CERVICOTHORACIC REGION: ICD-10-CM

## 2023-12-13 DIAGNOSIS — M54.13 RADICULOPATHY OF CERVICOTHORACIC REGION: ICD-10-CM

## 2023-12-13 LAB
ANA SER QL IF: NEGATIVE
PATH REPORT.FINAL DX SPEC: NORMAL

## 2023-12-13 PROCEDURE — 98940 CHIROPRACT MANJ 1-2 REGIONS: CPT | Mod: AT | Performed by: CHIROPRACTOR

## 2023-12-13 NOTE — PROGRESS NOTES
Medial back/spine is constant burning 6/10 W24 7/10 radiating into both left and right shoulders going down into both arms down to elbow causing numbing and tingling.using cold and hot packs providing some comfort using tiger balm patches.  Hayley Stacey on 12/13/2023 at 8:59 AM    Reviewed by CC.    Visit #:  4    Subjective:  Noemí Garcia is a 33 year old female who is seen in f/u up for:        Segmental and somatic dysfunction of thoracic region  Segmental and somatic dysfunction of cervical region  Segmental and somatic dysfunction of sacral region  TOS (thoracic outlet syndrome)  Radiculopathy of cervicothoracic region  Dorsalgia of cervicothoracic region  Bilateral low back pain with left-sided sciatica, unspecified chronicity.     Since last visit on 12/11/2023,  Noemí Garcia reports: After last visit she had great reduction in symptoms again.  That reduction lasted until she went to work.  Patient states that symptoms are about the same today after working a couple shifts.  She did meet with her primary on Monday as well and is being scheduled for an cervical MRI which is to take place on December 22 as well as an EMG.  Patient reports that lower back pain still present today with occasional tingling into the left foot.    (DVPRS) Pain Rating Score : 6-Hard to ignore, avoid usual activities (W24 7/10) (12/13/23 0853)     Objective:  The following was observed:  Pulse 63   Temp (!) 96.7  F (35.9  C) (Tympanic)   Resp 20   LMP 12/04/2023   SpO2 98%      P: palpatory tendernessGluteal, Levator scapulae, Sub-occipital, T-spine paraspinal, and Traps T4 bilaterally  A: static palpation demonstrates intersegmental asymmetry   R: motion palpation notes restricted motion  T: hypertonicity at: Levator scapulae, Sub-occipital, and T-spine paraspinal Bilaterally    Segmental spinal dysfunction/restrictions found at:  :  T4 Left rotation restricted, Right lateral flexion restricted, and Extension restriction  T9  Right lateral flexion restricted and Extension restriction  Sacrum Left lateral flexion restricted and Extension restriction.      Assessment: Patient continues to improve following treatment.  Largest contributing factor seems to be work.  MRI will be more telling to rule out cervical disc.  Segmental somatic dysfunction present in the sacral region.  Patient has increased flexibility of the hips however and will make side posture manual adjusting difficult.    Diagnoses:      1. Segmental and somatic dysfunction of thoracic region    2. Segmental and somatic dysfunction of cervical region    3. Segmental and somatic dysfunction of sacral region    4. TOS (thoracic outlet syndrome)    5. Radiculopathy of cervicothoracic region    6. Dorsalgia of cervicothoracic region    7. Bilateral low back pain with left-sided sciatica, unspecified chronicity        Patient's condition:  Patient had restrictions pre-manipulation    Treatment effectiveness:  Post manipulation there is better intersegmental movement and Patient claims to feel looser post manipulation      Procedures:  CMT:  22752 Chiropractic manipulative treatment 1-2 regions performed   Thoracic: Diversified, T4, T9, Prone  Pelvis: Diversified, Sacrum , Side posture.  Consider drop technique next visit.    Modalities:  18812: MSTM:  To Sub-occipital, Traps, and paravertebrals, manual traction cervical spine supine:   for 5 min    Therapeutic procedures:  None    Response to Treatment  Reduction in symptoms as reported by patient    Prognosis: Good    Progress towards Goals: Patient is making progress towards the goal.     Recommendations:    Instructions: Continue to utilize ice, perform stretches given    Follow-up: Follow-up next week

## 2023-12-20 ENCOUNTER — THERAPY VISIT (OUTPATIENT)
Dept: CHIROPRACTIC MEDICINE | Facility: OTHER | Age: 33
End: 2023-12-20
Attending: CHIROPRACTOR
Payer: COMMERCIAL

## 2023-12-20 VITALS — RESPIRATION RATE: 16 BRPM | HEART RATE: 70 BPM | OXYGEN SATURATION: 99 % | TEMPERATURE: 97.5 F

## 2023-12-20 DIAGNOSIS — G54.0 TOS (THORACIC OUTLET SYNDROME): ICD-10-CM

## 2023-12-20 DIAGNOSIS — M25.531 RIGHT WRIST PAIN: ICD-10-CM

## 2023-12-20 DIAGNOSIS — M99.02 SEGMENTAL AND SOMATIC DYSFUNCTION OF THORACIC REGION: Primary | ICD-10-CM

## 2023-12-20 DIAGNOSIS — M25.571 PAIN IN JOINT, ANKLE AND FOOT, RIGHT: ICD-10-CM

## 2023-12-20 DIAGNOSIS — M54.6 DORSALGIA OF CERVICOTHORACIC REGION: ICD-10-CM

## 2023-12-20 DIAGNOSIS — M99.01 SEGMENTAL AND SOMATIC DYSFUNCTION OF CERVICAL REGION: ICD-10-CM

## 2023-12-20 DIAGNOSIS — M54.2 DORSALGIA OF CERVICOTHORACIC REGION: ICD-10-CM

## 2023-12-20 PROCEDURE — 98940 CHIROPRACT MANJ 1-2 REGIONS: CPT | Mod: AT | Performed by: CHIROPRACTOR

## 2023-12-20 NOTE — PROGRESS NOTES
"Medial upper back with constant burning. 3/10 W24 8/10. Using ibuprofen \"takes the edge off and makes it tolerable.\" Also suing cold pack and heat pack with a decrease in pain at the time of use.   Ines Johnson on 12/20/2023 at 8:29 AM    Visit #:  5    Subjective:  Noemí Garcia is a 33 year old female who is seen in f/u up for:        Segmental and somatic dysfunction of thoracic region  Segmental and somatic dysfunction of cervical region  TOS (thoracic outlet syndrome)  Dorsalgia of cervicothoracic region  Right wrist pain  Pain in joint, ankle and foot, right.     Since last visit on 12/13/2023,  Noemí Garcia reports: That she is scheduled for an MRI of the cervical spine tomorrow.  She states that her neck and upper back are doing pretty good today, rating the pain 3/10.  Patient states that she feels like she is making good progress.  Yesterday she was very busy at work and her shoulder and arm pain was pretty intense.  She states that heat has been more helpful.  Overall patient feels like she is making good progress.  She states that her lower back feels great today.  She does describe some right wrist and right ankle discomfort.  The ankle discomfort also causes some tingling into the right toes.    (DVPRS) Pain Rating Score : 3-Sometimes distracts me (W24 8/10) (12/20/23 0825)     Objective:  The following was observed:  Pulse 70   Temp 97.5  F (36.4  C) (Tympanic)   Resp 16   LMP 12/04/2023   SpO2 99%      Orthopedic testing of the ankle and wrist negative  No tenderness    Significant restriction is noted at the mortise joint on the right compared to the left.  Very similarly with the carpals of the right compared to the left.    P: palpatory tenderness right upper trapezius, upper thoracic spine   A: static palpation demonstrates intersegmental asymmetry   R: motion palpation notes restricted motion  T: hypertonicity at: Levator scapulae, T-spine paraspinal, and Traps Bilaterally    Segmental " spinal dysfunction/restrictions found at:  :  T3 Left rotation restricted, Right lateral flexion restricted, and Extension restriction  T6 Right lateral flexion restricted and Extension restriction.      Assessment: Patient seems to be making good progress.  She is scheduled for an MRI tomorrow to rule out any cervical origin of the radicular pain.  Working diagnosis continues to be thoracic outlet syndrome.  Because she has had improvement with heat we will trial ultrasound therapy today in the right upper trapezius to see if that provides relief.  No orthopedic tests were positive in the wrist or ankle suggesting pathology however they are very stiff and mobilization will be performed in those areas to see if alleviation of symptoms can be achieved.    Diagnoses:      1. Segmental and somatic dysfunction of thoracic region    2. Segmental and somatic dysfunction of cervical region    3. TOS (thoracic outlet syndrome)    4. Dorsalgia of cervicothoracic region    5. Right wrist pain    6. Pain in joint, ankle and foot, right        Patient's condition:  Patient had restrictions pre-manipulation    Treatment effectiveness:  Post manipulation there is better intersegmental movement and Patient claims to feel looser post manipulation      Procedures:  CMT:  48171 Chiropractic manipulative treatment 1-2 regions performed   Thoracic: Diversified, T3, T6, Prone    Modalities:  69731: US:  1 [place]ley/cm squared for 3 minutes at 1mhz  pulsed, Location: Right upper trapezius.  Symptoms seemed to worsen during treatment with numbness into the hand and a burning sensation in the mid back.  Treatment was discontinued at 3 minutes.    Therapeutic procedures:  01947: Manual therapy, myofascial release of the right upper trapezius, cervical paravertebral and levator with trigger point therapy in the right upper trapezius and rhomboid.  Joint mobilization was performed manually to the right mortise joint and right carpal bones.   Improved range of motion was achieved.  Treatment time 12 minutes.    Response to Treatment  Reduction in symptoms as reported by patient.  Arm pain and upper back pain immediately improved after treatment.    Prognosis: Good    Progress towards Goals: Patient is making progress towards the goal.     Recommendations:    Instructions: Patient is advised to continue using heat as needed.  Maintain mobility and continue performing stretches and exercises given.    Follow-up: Patient will follow-up when able after the holiday season.

## 2023-12-22 ENCOUNTER — HOSPITAL ENCOUNTER (OUTPATIENT)
Dept: MRI IMAGING | Facility: OTHER | Age: 33
Discharge: HOME | End: 2023-12-22
Attending: PHYSICIAN ASSISTANT | Admitting: PHYSICIAN ASSISTANT
Payer: COMMERCIAL

## 2023-12-22 DIAGNOSIS — R20.2 NUMBNESS AND TINGLING OF RIGHT ARM: ICD-10-CM

## 2023-12-22 DIAGNOSIS — M79.10 MYALGIA: ICD-10-CM

## 2023-12-22 DIAGNOSIS — M54.2 CERVICALGIA: ICD-10-CM

## 2023-12-22 DIAGNOSIS — R20.0 NUMBNESS AND TINGLING OF RIGHT ARM: ICD-10-CM

## 2023-12-22 PROCEDURE — 72141 MRI NECK SPINE W/O DYE: CPT

## 2023-12-27 ENCOUNTER — MYC MEDICAL ADVICE (OUTPATIENT)
Dept: FAMILY MEDICINE | Facility: OTHER | Age: 33
End: 2023-12-27
Payer: COMMERCIAL

## 2023-12-27 DIAGNOSIS — R93.7 ABNORMAL MRI, CERVICAL SPINE: Primary | ICD-10-CM

## 2024-01-02 ENCOUNTER — THERAPY VISIT (OUTPATIENT)
Dept: CHIROPRACTIC MEDICINE | Facility: OTHER | Age: 34
End: 2024-01-02
Attending: CHIROPRACTOR
Payer: COMMERCIAL

## 2024-01-02 VITALS — OXYGEN SATURATION: 100 % | RESPIRATION RATE: 16 BRPM | HEART RATE: 59 BPM | TEMPERATURE: 98.2 F

## 2024-01-02 DIAGNOSIS — M54.13 RADICULOPATHY OF CERVICOTHORACIC REGION: ICD-10-CM

## 2024-01-02 DIAGNOSIS — M99.02 SEGMENTAL AND SOMATIC DYSFUNCTION OF THORACIC REGION: Primary | ICD-10-CM

## 2024-01-02 DIAGNOSIS — M54.2 DORSALGIA OF CERVICOTHORACIC REGION: ICD-10-CM

## 2024-01-02 DIAGNOSIS — M99.01 SEGMENTAL AND SOMATIC DYSFUNCTION OF CERVICAL REGION: ICD-10-CM

## 2024-01-02 DIAGNOSIS — M54.6 DORSALGIA OF CERVICOTHORACIC REGION: ICD-10-CM

## 2024-01-02 PROCEDURE — 98940 CHIROPRACT MANJ 1-2 REGIONS: CPT | Mod: AT | Performed by: CHIROPRACTOR

## 2024-01-02 NOTE — PROGRESS NOTES
Medial upper back with intermittent burning. 5/10 W24 8/10. Using Tylenol, hot pack and tiger balm with a temporary decrease in pain.   Ines Johnson on 1/2/2024 at 8:34 AM    Reviewed by CC.    Visit #:  6    Subjective:  Noemí Garcia is a 33 year old female who is seen in f/u up for:        Segmental and somatic dysfunction of thoracic region  Segmental and somatic dysfunction of cervical region  Dorsalgia of cervicothoracic region  Radiculopathy of cervicothoracic region.     Since last visit on 12/20/2023,  Noemí Garcia reports: That she just finished working 4 shifts.  Patient reports that symptoms are back again primarily in the medial upper back with intermittent burning.  She reports that she is not having much pain down the right arm which is good.  Overall she has noticed improvements but symptoms seem to return with busy work schedules.  Patient did have her MRI of her cervical spine with results for my review.  Patient's primary has referred for orthopedic consult and physical therapy.    (PRS) Pain Rating Score : 5-Interrupts some activities (W24 8/10) (01/02/24 0830)     Objective:  The following was observed:  Pulse 59   Temp 98.2  F (36.8  C) (Tympanic)   Resp 16   LMP 12/04/2023   SpO2 100%      EXAM: MR CERVICAL SPINE W/O CONTRAST 12/22/2023 7:38 AM     PROVIDED HISTORY: cervicalgia, right forearm and wrist pain; Numbness  and tingling of right arm; Numbness and tingling of right arm;  Cervicalgia; Myalgia     COMPARISON: CT neck 11/20/2021     TECHNIQUE: Sagittal T1-weighted, sagittal T2-weighted, sagittal STIR,  axial T2-weighted, and axial T2* gradient echo images of the cervical  spine were obtained without intravenous contrast.     FINDINGS:  The cervical vertebrae are normally aligned. There is no disc space  loss at any level. Normal lordotic curvature of the cervical spine.  There is normal signal within the cervical spinal cord. Regarding bone  marrow signal intensity, no suspicious  abnormality is visualized on  STIR images.      The findings on a level by level basis are as follows:     C2-3: No spinal canal or neural foraminal stenosis.     C3-4:  Disc bulge. No spinal canal or neural foraminal stenosis.     C4-5:  Disc bulge. Uncinate and facet hypertrophy. Mild right  foraminal narrowing. No spinal canal or left neural foraminal  stenosis.     C5-6:  Disc bulge with superimposed shallow left central disc  protrusion. Uncinate and facet hypertrophy. Mild right foraminal  narrowing. No spinal canal or left neural foraminal stenosis.     C6-7:  Disc bulge. No spinal canal or neural foraminal stenosis.     C7-T1:  No spinal canal or neural foraminal stenosis.     No abnormality of the paraspinous soft tissues.                                                                      IMPRESSION:   Mild multilevel cervical spondylosis, most prominent at C5-6 where  there is a shallow disc bulge and mild right foraminal narrowing. No  high-grade spinal canal narrowing at any level.     OMAIRA SUNSHINE MD     P: palpatory tendernessSub-occipital, Traps, and C7-T1, cervical paraspinals, mid thoracic spine T3-T7:    A: static palpation demonstrates intersegmental asymmetry   R: motion palpation notes restricted motion  T: hypertonicity at: Sub-occipital, Traps, and cervical paraspinals:  R>>L    Segmental spinal dysfunction/restrictions found at:  :  T3 Left rotation restricted, Right lateral flexion restricted, and Extension restriction  T8 Right rotation restricted and Extension restriction.    Assessment: Patient's symptoms continue to improve with treatment but do return with stressful work.  MRI does reveal mild multilevel cervical spondylosis with shallow disc bulge at C5-C6 and mild right foraminal narrowing.  Right foraminal narrowing could explain right radicular symptoms.  Those symptoms have reduced and the majority of her discomfort is felt at the CT junction into the upper back.  Primary has  referred for orthopedic consult as well as physical therapy.  I recommend that the patient schedule orthopedic consult in case symptoms do worsen and is needed otherwise plan for physical therapy at this time.  Plan is to comanage with patient through physical therapy.    Diagnoses:      1. Segmental and somatic dysfunction of thoracic region    2. Segmental and somatic dysfunction of cervical region    3. Dorsalgia of cervicothoracic region    4. Radiculopathy of cervicothoracic region        Patient's condition:  Patient had restrictions pre-manipulation    Treatment effectiveness:  Post manipulation there is better intersegmental movement and Patient claims to feel looser post manipulation      Procedures:  CMT:  76353 Chiropractic manipulative treatment 1-2 regions performed   Thoracic: Diversified, T3, T8, Prone    Modalities:  85169: Manual therapy, supine traction mobilization of the cervical spine, myofascial release of the right upper trapezius, cervical paraspinals, suboccipital.  Trigger point therapy in the upper trapezius and levators bilaterally.    Response to Treatment  Reduction in symptoms as reported by patient    Prognosis: Good    Progress towards Goals: Patient is making progress towards the goal.     Recommendations:    Instructions:ice 20 minutes every other hour as needed and stretch as instructed at visit    Follow-up:  Return to care later this week or next

## 2024-01-09 ENCOUNTER — THERAPY VISIT (OUTPATIENT)
Dept: CHIROPRACTIC MEDICINE | Facility: OTHER | Age: 34
End: 2024-01-09
Attending: CHIROPRACTOR
Payer: COMMERCIAL

## 2024-01-09 VITALS — TEMPERATURE: 96.6 F | OXYGEN SATURATION: 99 % | HEART RATE: 71 BPM | RESPIRATION RATE: 16 BRPM

## 2024-01-09 DIAGNOSIS — M99.02 SEGMENTAL AND SOMATIC DYSFUNCTION OF THORACIC REGION: Primary | ICD-10-CM

## 2024-01-09 DIAGNOSIS — M54.6 DORSALGIA OF CERVICOTHORACIC REGION: ICD-10-CM

## 2024-01-09 DIAGNOSIS — M54.13 RADICULOPATHY OF CERVICOTHORACIC REGION: ICD-10-CM

## 2024-01-09 DIAGNOSIS — M99.01 SEGMENTAL AND SOMATIC DYSFUNCTION OF CERVICAL REGION: ICD-10-CM

## 2024-01-09 DIAGNOSIS — M54.2 DORSALGIA OF CERVICOTHORACIC REGION: ICD-10-CM

## 2024-01-09 PROCEDURE — 98940 CHIROPRACT MANJ 1-2 REGIONS: CPT | Mod: AT | Performed by: CHIROPRACTOR

## 2024-01-09 NOTE — PROGRESS NOTES
"Medial upper back with constant burning and \"tearing\". 7/10 W24 10/10. Radiating to shoulders causing a \"tearing feeling\". Using heat pad, ibuprofen, Tylenol and hot shower with no change. Tiger balm patches with a temporary decrease in pain.   Ines Garnetton on 1/9/2024 at 8:25 AM    Reviewed by CC.    Visit #:  7    Subjective:  Noemí Garcia is a 33 year old female who is seen in f/u up for:      Segmental and somatic dysfunction of thoracic region  Segmental and somatic dysfunction of cervical region  Dorsalgia of cervicothoracic region  Radiculopathy of cervicothoracic region.     Since last visit on 1/2/2024,  Noemí Garcia reports: After last visit she had great reduction in symptoms.  That reduction lasted until yesterday.  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  That seemed to flareup symptoms as today she describes the pain as a constant burning and tearing pain rated 7-10/10.  Patient states that Tiger balm patches have provided slight decrease in symptoms.  Patient is scheduled for physical therapy to start the beginning of February.  She is also experiencing more symptoms on the left than she is used to with tingling bilaterally into her hands.    (DVPRS) Pain Rating Score : 7-Focus of attention, prevents doing daily activities (W24 10/10) (01/09/24 0818)     Objective:  The following was observed:  Pulse 71   Temp (!) 96.6  F (35.9  C) (Tympanic)   Resp 16   LMP 12/04/2023   SpO2 99%      Oswestry ([facility]) Questionnaire        1/9/2024     8:26 AM   OSWESTRY DISABILITY INDEX   Count 9   Sum 18   Oswestry Score (%) 40 %       P: palpatory tendernessLevator scapulae, T-spine paraspinal, and Traps Bilaterally  A: static palpation demonstrates intersegmental asymmetry   R: motion palpation notes restricted motion  T: hypertonicity at: Levator scapulae, T-spine paraspinal, TFL, and cervical paravertebrals:  Bilaterally    Segmental spinal " dysfunction/restrictions found at:  :  T3 Right rotation restricted, Left lateral flexion restricted, and Extension restriction  T7 Right lateral flexion restricted and Extension restriction.      Assessment: Patient is experienced a pretty significant flareup in symptoms, the first flareup to this degree since starting treatment.  Oswestry was performed today however scores do not reflect improvement that was made prior to this flareup.  Plan to continue 1-2 visits weekly until physical therapy started.    Diagnoses:      1. Segmental and somatic dysfunction of thoracic region    2. Segmental and somatic dysfunction of cervical region    3. Dorsalgia of cervicothoracic region    4. Radiculopathy of cervicothoracic region        Patient's condition:  Patient had restrictions pre-manipulation    Treatment effectiveness:  Post manipulation there is better intersegmental movement and Patient claims to feel looser post manipulation      Procedures:  CMT:  19712 Chiropractic manipulative treatment 1-2 regions performed   Thoracic: Diversified, T3, T7, Prone    Modalities:  None performed this visit    Therapeutic procedures:  50184: Manual therapy, manual traction cervical spine supine.  Myofascial release and pin and stretch technique to the suboccipitals, cervical paravertebrals, levator Scap, upper trapezius bilaterally.  Trigger point therapy to the levator Scap bilaterally.  Treatment time 10 minutes.    Response to Treatment  Patient reported immediate reduction in symptoms.  Patient still had tingling in the hands but neck and upper back pain were significantly reduced.  Range of motion immediately improved.    Prognosis: Good    Progress towards Goals: Patient is making progress towards the goal.     Recommendations:    Instructions: Patient is advised to continue using ice and heat as well as stretches.  Patient was provided with a work note to limit her time spent sitting to less than 30%.  Limit her lifting to  25 pounds both at work and at home.    Follow-up: Follow-up within 1 week..

## 2024-01-17 ENCOUNTER — THERAPY VISIT (OUTPATIENT)
Dept: CHIROPRACTIC MEDICINE | Facility: OTHER | Age: 34
End: 2024-01-17
Attending: PHYSICIAN ASSISTANT
Payer: COMMERCIAL

## 2024-01-17 VITALS — RESPIRATION RATE: 16 BRPM | HEART RATE: 65 BPM | OXYGEN SATURATION: 96 % | TEMPERATURE: 97.6 F

## 2024-01-17 DIAGNOSIS — M54.2 DORSALGIA OF CERVICOTHORACIC REGION: ICD-10-CM

## 2024-01-17 DIAGNOSIS — M54.6 DORSALGIA OF CERVICOTHORACIC REGION: ICD-10-CM

## 2024-01-17 DIAGNOSIS — M99.02 SEGMENTAL AND SOMATIC DYSFUNCTION OF THORACIC REGION: Primary | ICD-10-CM

## 2024-01-17 DIAGNOSIS — M99.01 SEGMENTAL AND SOMATIC DYSFUNCTION OF CERVICAL REGION: ICD-10-CM

## 2024-01-17 PROCEDURE — 98940 CHIROPRACT MANJ 1-2 REGIONS: CPT | Mod: AT | Performed by: CHIROPRACTOR

## 2024-01-17 NOTE — PROGRESS NOTES
Medial upper back with intermittent burning and aching. 2/10 W24 3/10. Using Tiger balm patches and Ibuprofen with a decrease in pain.   Ines Johnson on 1/17/2024 at 8:30 AM    Reviewed by CC.    Visit #:  8    Subjective:  Noemí Garcia is a 33 year old female who is seen in f/u up for:        Segmental and somatic dysfunction of thoracic region  Segmental and somatic dysfunction of cervical region  Dorsalgia of cervicothoracic region.     Since last visit on 1/9/2024,  Noemí Garcia reports: Significant reduction in symptoms following last treatment.  Patient states that is lasted even with work.  She has limited her lifting at work which has significantly helped as well.  Currently rating the pain 2-3 out of 10 but stating that she is not having any pain down into her arms.  Overall great reduction in symptoms.      (DVPRS) Pain Rating Score : 2-Notice pain, does not interfere with activities (W24 3/10) (01/17/24 0828)     Objective:  The following was observed:  Pulse 65   Temp 97.6  F (36.4  C) (Tympanic)   Resp 16   LMP 12/04/2023   SpO2 96%      P: palpatory tendernessLevator scapulae, T-spine paraspinal, Traps, and T4:    A: static palpation demonstrates intersegmental asymmetry , thoracic  R: motion palpation notes restricted motion  T: hypertonicity at: Levator scapulae, T-spine paraspinal, and Traps Bilaterally    Segmental spinal dysfunction/restrictions found at:  :  T4 Extension restriction.      Assessment: Patient is making good subjective and objective progress.  Applying lifting restrictions has been very helpful with limiting flareups it appears.  Continue treatment plan until patient makes it to physical therapy.    Diagnoses:      1. Segmental and somatic dysfunction of thoracic region    2. Segmental and somatic dysfunction of cervical region    3. Dorsalgia of cervicothoracic region        Patient's condition:  Patient had restrictions pre-manipulation    Treatment effectiveness:  Post  manipulation there is better intersegmental movement and Patient claims to feel looser post manipulation      Procedures:  CMT:  32368 Chiropractic manipulative treatment 1-2 regions performed   Thoracic: Diversified, T4, Prone    Modalities:  None performed this visit    Therapeutic procedures:  18122: Manual therapy, myofascial release to the suboccipitals and cervical paravertebrals, upper trapezius pin and stretch, manual cervical traction supine.  Treatment time 7 minutes    Response to Treatment  Reduction in symptoms as reported by patient    Prognosis: Good    Progress towards Goals: Patient is making progress towards the goal.     Recommendations:    Instructions: Continue using ice or heat if needed, performing stretches shown at home    Follow-up: Follow-up in 1 week

## 2024-02-02 ENCOUNTER — THERAPY VISIT (OUTPATIENT)
Dept: PHYSICAL THERAPY | Facility: OTHER | Age: 34
End: 2024-02-02
Attending: PHYSICIAN ASSISTANT
Payer: COMMERCIAL

## 2024-02-02 DIAGNOSIS — R93.7 ABNORMAL MRI, CERVICAL SPINE: ICD-10-CM

## 2024-02-02 PROCEDURE — 97161 PT EVAL LOW COMPLEX 20 MIN: CPT | Mod: GP,PO

## 2024-02-02 PROCEDURE — 97110 THERAPEUTIC EXERCISES: CPT | Mod: GP,PO

## 2024-02-02 NOTE — PROGRESS NOTES
PHYSICAL THERAPY EVALUATION  Type of Visit: Evaluation    See electronic medical record for Abuse and Falls Screening details.    Subjective       Presenting condition or subjective complaint: Neck/upper back pain  Patient reports that she has been experiencing neck/upper back pain for several years.  She reports that she has recently been receiving chiropractic adjustments which have helped significantly decrease symptoms.  Patient previously was experiencing right upper extremity numbness which is now resolved.  She reports continued improvements with decreasing neck and upper back pain.  She would like to learn some exercises and activities she can do to prevent recurrence of symptoms.  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 [employer]      Patient goals for therapy: Perform daily activities and work activities without neck/upper back pain       Objective   CERVICAL SPINE EVALUATION  PAIN: Pain Level at Rest: 1/10  Pain Level with Use: 5/10  Pain Location: cervical spine and thoracic spine  Pain Quality: Aching and Burning  Pain Frequency: With activity  Pain is Exacerbated By: Sitting, certain positions, household tasks and work tasks  Pain is Relieved By: heat, otc medications, and rest  INTEGUMENTARY (edema, incisions): WNL  POSTURE:  Mild rounded shoulder posture in sitting with patient is able to correct with minimal to no cues    ROM:   Cervical range of motion:  Flexion: 30 degrees-limited by stiffness over the posterior neck and upper back  Extension: 25 degrees-limited by stiffness over the posterior neck and upper back  Left sidebendin  degrees limited by tightness on the right  Right sidebendin degrees-limited by tightness on the left  Left rotation: 45 degrees  Right rotation: 45 degrees    MYOTOMES:    Left Right   C1-2 (Neck Flexion) 5 5   C3 (Neck Side Bend)  5 5   C4 (Shrug) 5 5   C5 (Deltoid) 5 5   C6 (Biceps) 5 5   C7 (Triceps) 5 5   C8 (Thumb Ext) 5 5   T1 (Intrinsics) 5 5       FLEXIBILITY: Decreased upper trapezius and levator scapulae flexibility bilaterally    PALPATION: Tightness noted throughout the bilateral upper trapezius, levator scapulae and rhomboid region.  ERS right at T4    Assessment & Plan   CLINICAL IMPRESSIONS  Medical Diagnosis: Abnormal MRI, cervical spine (R93.7)    Treatment Diagnosis: Cervical/thoracic pain, impaired range of motion, postural dysfunction, weakness   Impression/Assessment: Patient is a 33 year old female with neck/thoracic pain complaints.  The following significant findings have been identified: Pain, Decreased ROM/flexibility, Decreased joint mobility, Decreased strength, Impaired muscle performance, Decreased activity tolerance, and Impaired posture. These impairments interfere with their ability to perform work tasks, recreational activities, and household chores as compared to previous level of function.     Clinical Decision Making (Complexity):  Clinical Presentation: Stable/Uncomplicated  Clinical Presentation Rationale: based on medical and personal factors listed in PT evaluation  Clinical Decision Making (Complexity): Low complexity    PLAN OF CARE  Treatment Interventions:  Modalities: Cryotherapy, Hot Pack  Interventions: Manual Therapy, Neuromuscular Re-education, Therapeutic Exercise    Long Term Goals     PT Goal 1  Goal Identifier: Pain  Goal Description: Patient will report neck pain at a 1/10 or less when performing all nursing duties.  Rationale: to maximize safety and independence with performance of ADLs and functional tasks;to maximize safety and independence within the  community  Target Date: 03/29/24  PT Goal 2  Goal Identifier: Range of motion  Goal Description: Patient will improve cervical flexion and extension by 10 degrees with no reports of stiffness to allow looking up and down at work and at home without difficulty.  Rationale: to maximize safety and independence with performance of ADLs and functional tasks;to maximize safety and independence within the home;to maximize safety and independence within the community;to maximize safety and independence with self cares  Target Date: 03/29/24  PT Goal 3  Goal Identifier: Weakness  Goal Description: Patient will report and demonstrate improved sitting posture in standing posture in order to decrease neck and upper back pain..  Rationale: to maximize safety and independence with performance of ADLs and functional tasks;to maximize safety and independence within the home;to maximize safety and independence within the community;to maximize safety and independence with self cares  Target Date: 03/29/24      Frequency of Treatment: 8 visits  Duration of Treatment: 8 weeks      Education Assessment:   Learner/Method: Patient;Listening;Reading;Demonstration;Pictures/Video;No Barriers to Learning    Risks and benefits of evaluation/treatment have been explained.   Patient/Family/caregiver agrees with Plan of Care.     Evaluation Time:     PT Eval, Low Complexity Minutes (05649): 20       Signing Clinician: Reinaldo Hurtado PT

## 2024-02-05 ENCOUNTER — THERAPY VISIT (OUTPATIENT)
Dept: CHIROPRACTIC MEDICINE | Facility: OTHER | Age: 34
End: 2024-02-05
Attending: CHIROPRACTOR
Payer: COMMERCIAL

## 2024-02-05 VITALS — OXYGEN SATURATION: 97 % | TEMPERATURE: 97 F | RESPIRATION RATE: 16 BRPM | HEART RATE: 68 BPM

## 2024-02-05 DIAGNOSIS — M99.02 SEGMENTAL AND SOMATIC DYSFUNCTION OF THORACIC REGION: Primary | ICD-10-CM

## 2024-02-05 DIAGNOSIS — M54.6 DORSALGIA OF CERVICOTHORACIC REGION: ICD-10-CM

## 2024-02-05 DIAGNOSIS — M54.2 DORSALGIA OF CERVICOTHORACIC REGION: ICD-10-CM

## 2024-02-05 PROCEDURE — 98940 CHIROPRACT MANJ 1-2 REGIONS: CPT | Mod: AT | Performed by: CHIROPRACTOR

## 2024-02-05 NOTE — PROGRESS NOTES
Upper medial back rates 0/10 W24 3/10. Occasional aching and burning. Not using any interventions at this time.  Shruthi Lemons on 2/5/2024 at 8:22 AM     Reviewed by CC.    Visit #:  9    Subjective:  Noemí Garcia is a 33 year old female who is seen in f/u up for:        Segmental and somatic dysfunction of thoracic region  Segmental and somatic dysfunction of cervical region  Dorsalgia of cervicothoracic region.     Since last visit on 1/17/2024,  Noemí Garcia reports: Patient has had significant reduction in symptoms with very little increase.  She reports occasional aching or burning.  Physical therapy last week went well and she currently has physical therapy weekly for the next 2 weeks.  Overall work has been very tolerable without increasing pain.  She did have some upper back pain yesterday but was able to self manage.  Patient reports no radicular symptoms.    (DVPRS) Pain Rating Score : 0-No pain (W24 3/10) (02/05/24 0816)     Objective:  The following was observed:  Pulse 68   Temp 97  F (36.1  C) (Tympanic)   Resp 16   LMP 12/04/2023   SpO2 97%      P: palpatory tenderness T5, bilateral upper trapezius and levator Bilaterally  A: static palpation demonstrates intersegmental asymmetry , thoracic  R: motion palpation notes restricted motion  T: hypertonicity at: Levator scapulae, Sub-occipital, Traps, and cervical paravertebrals:  L>>R    Segmental spinal dysfunction/restrictions found at:  :  T5 Extension restriction.      Assessment: Patient is progressing very well.  Physical therapy seems to be helping also.  Will recommend follow-up as needed as she returns to a 7-day stretch of work next week.    Diagnoses:      1. Segmental and somatic dysfunction of thoracic region    2. Segmental and somatic dysfunction of cervical region    3. Dorsalgia of cervicothoracic region        Patient's condition:  Patient had restrictions pre-manipulation    Treatment effectiveness:  Post manipulation there is  better intersegmental movement and Patient claims to feel looser post manipulation      Procedures:  CMT:  53587 Chiropractic manipulative treatment 1-2 regions performed   Thoracic: Diversified, T5, Prone    Modalities:  None performed this visit    Therapeutic procedures:  62528: Manual therapy, myofascial release to the suboccipitals, cervical paravertebrals, upper trapezius and levators bilaterally.  Trigger point therapy to the upper trapezius and levator.  Manual traction cervical spine supine.  Treatment time 8 minutes.    Response to Treatment  Reduction in symptoms as reported by patient    Prognosis: Good    Progress towards Goals: Patient is making progress towards the goal.     Recommendations:    Instructions: Continue stretches and exercises given by physical therapist.    Follow-up: Follow-up next week

## 2024-03-04 ENCOUNTER — OFFICE VISIT (OUTPATIENT)
Dept: FAMILY MEDICINE | Facility: OTHER | Age: 34
End: 2024-03-04
Attending: NURSE PRACTITIONER
Payer: COMMERCIAL

## 2024-03-04 VITALS
HEIGHT: 62 IN | TEMPERATURE: 98.3 F | HEART RATE: 67 BPM | WEIGHT: 116 LBS | RESPIRATION RATE: 20 BRPM | DIASTOLIC BLOOD PRESSURE: 76 MMHG | BODY MASS INDEX: 21.35 KG/M2 | SYSTOLIC BLOOD PRESSURE: 112 MMHG | OXYGEN SATURATION: 97 %

## 2024-03-04 DIAGNOSIS — J10.1 INFLUENZA B: Primary | ICD-10-CM

## 2024-03-04 DIAGNOSIS — R11.0 NAUSEA: ICD-10-CM

## 2024-03-04 DIAGNOSIS — R42 DIZZINESS: ICD-10-CM

## 2024-03-04 LAB
FLUAV RNA SPEC QL NAA+PROBE: NEGATIVE
FLUBV RNA RESP QL NAA+PROBE: POSITIVE
RSV RNA SPEC NAA+PROBE: NEGATIVE
SARS-COV-2 RNA RESP QL NAA+PROBE: NEGATIVE

## 2024-03-04 PROCEDURE — 99213 OFFICE O/P EST LOW 20 MIN: CPT

## 2024-03-04 PROCEDURE — 87637 SARSCOV2&INF A&B&RSV AMP PRB: CPT | Mod: ZL

## 2024-03-04 RX ORDER — VITAMIN B COMPLEX
2000 TABLET ORAL DAILY
COMMUNITY

## 2024-03-04 RX ORDER — ONDANSETRON 4 MG/1
4 TABLET, ORALLY DISINTEGRATING ORAL EVERY 8 HOURS PRN
Qty: 6 TABLET | Refills: 0 | Status: SHIPPED | OUTPATIENT
Start: 2024-03-04

## 2024-03-04 RX ORDER — OSELTAMIVIR PHOSPHATE 75 MG/1
75 CAPSULE ORAL 2 TIMES DAILY
Qty: 10 CAPSULE | Refills: 0 | Status: SHIPPED | OUTPATIENT
Start: 2024-03-04 | End: 2024-03-09

## 2024-03-04 ASSESSMENT — PAIN SCALES - GENERAL: PAINLEVEL: MODERATE PAIN (4)

## 2024-03-04 NOTE — PROGRESS NOTES
ASSESSMENT/PLAN:    (J10.1) Influenza B  (primary encounter diagnosis); (R42) Dizziness  Comment: Patient with a cough, rhinorrhea, hot flashes, and some dizziness that started yesterday.  She has a workplace exposures to influenza.  On exam today she is afebrile and vital signs are stable, bilateral breath sounds are clear, posterior pharynx with erythema, no exudates.  Viral multiplex testing was obtained and she was positive for influenza B.  At this time I recommend symptomatic care as well as Tamiflu therapy.  I did discuss risks and benefits of Tamiflu therapy with her and she would like to proceed with treatment.  Plan: Symptomatic Influenza A/B, RSV, & SARS-CoV2 PCR        (COVID-19) Nose, oseltamivir (TAMIFLU) 75 MG         capsule  Symptomatic treatment - Encouraged fluids, salt water gargles, honey (only if greater than 1 year in age due to risk of botulism), elevation, humidifier, sinus rinse/netti pot, lozenges, tea, topical vapor rub, popsicles, rest, etc     (R11.0) Nausea  Comment: Patient with influenza B as noted above.  She has been having nausea.  She treated with one-time dose of Zofran.  This helped improve symptoms.  I did send a prescription for Zofran.  Plan: Symptomatic Influenza A/B, RSV, & SARS-CoV2 PCR        (COVID-19) Nose, ondansetron (ZOFRAN ODT) 4 MG         ODT tab    Discussed warning signs/symptoms indicative of need to f/u    Follow up if symptoms persist or worsen or concerns    I have reviewed the nursing notes.  I have reviewed the findings, diagnosis, plan and need for follow up with the patient.    I explained my diagnostic considerations and recommendations to the patient, who voiced understanding and agreement with the treatment plan. All questions were answered. We discussed potential side effects of any prescribed or recommended therapies, as well as expectations for response to treatments.    VAMSI MCCARTY, ANNE MARIE CNP  3/4/2024  1:27 PM    HPI:    Noemí Garcia is a  33 year old female  who presents to [facility] today for concerns of flu symptoms.    Symptoms started yesterday. She has a cough, hot flashes, rhinorrhea, etc. No sore throat.  She has had some bouts of dizziness.  She reports it feels like the room may be spinning.  She denies any otalgia.  She believes she is staying well-hydrated.  She does endorse some nausea, she took an old dose of Zofran which improved symptoms.  Exposures to influenza at work.    No known medication allergies.     PCP: Ben.     Past Medical History:   Diagnosis Date    Abnormal Pap smear of cervix     Cancer (H)     Cerebral infarction (H)     Depressive disorder      Past Surgical History:   Procedure Laterality Date    AS REMOVE GROIN LYMPH NODES      BONE MARROW BIOPSY      COLONOSCOPY      COLPOSCOPY      COMBINED CYSTOSCOPY, URETEROSCOPY, LASER HOLMIUM LITHOTRIPSY URETER(S) Right 3/15/2022    Procedure: Right ureteroscopy with laser lithotripsy an stent placement;  Surgeon: Justo Jimenez MD;  Location: [facility] OR    EXTRACORPOREAL SHOCK WAVE LITHOTRIPSY, CYSTOSCOPY, INSERT STENT URETER(S), COMBINED      GALLBLADDER SURGERY      HEAD & NECK SURGERY      cyst removal    KIDNEY STONE SURGERY      RHINOPLASTY       Social History     Tobacco Use    Smoking status: Former     Types: Vaping Device     Passive exposure: Past    Smokeless tobacco: Never    Tobacco comments:     12/23/20: 1-2 per day   Substance Use Topics    Alcohol use: Not Currently     Current Outpatient Medications   Medication Sig Dispense Refill    ondansetron (ZOFRAN ODT) 4 MG ODT tab Take 1 tablet (4 mg) by mouth every 8 hours as needed for nausea 6 tablet 0    Vitamin D3 (VITAMIN D, CHOLECALCIFEROL,) 25 mcg (1000 units) tablet Take 2,000 mcg by mouth daily 2000 international units - daily      clindamycin-benzoyl peroxide (BENZACLIN) 1-5 % external gel Apply topically 2 times daily (Patient not taking: Reported on 3/4/2024) 50 g 1     Allergies   Allergen Reactions     "Adhesive Tape Other (See Comments)     Blistered skin     Past medical history, past surgical history, current medications and allergies reviewed and accurate to the best of my knowledge.      ROS:  Refer to HPI    /76 (BP Location: Right arm, Patient Position: Sitting, Cuff Size: Adult Regular)   Pulse 67   Temp 98.3  F (36.8  C) (Tympanic)   Resp 20   Ht 1.575 m (5' 2\")   Wt 52.6 kg (116 lb)   LMP 03/01/2024 (Exact Date)   SpO2 97%   BMI 21.22 kg/m      EXAM:  General Appearance: Well appearing 33 year old female, appropriate appearance for age. No acute distress   Ears: Left TM intact, translucent with bony landmarks appreciated, no erythema, no effusion, no bulging, no purulence.  Right TM intact, translucent with bony landmarks appreciated, no erythema, no effusion, no bulging, no purulence.  Left auditory canal clear.  Right auditory canal clear.  Normal external ears, non tender.  Eyes: conjunctivae normal without erythema or irritation, corneas clear, no drainage or crusting, no eyelid swelling, pupils equal   Oropharynx: moist mucous membranes, posterior pharynx with erythema, no exudates or petechiae, no post nasal drip seen, no trismus, voice clear.    Sinuses:  No sinus tenderness upon palpation of the frontal or maxillary sinuses  Nose:  Bilateral nares: no erythema, no edema, no drainage or congestion   Neck: supple without adenopathy  Respiratory: normal chest wall and respirations.  Normal effort.  Clear to auscultation bilaterally, no wheezing, crackles or rhonchi.  No increased work of breathing.  No cough appreciated.  Cardiac: RRR with no murmurs  Abdomen: soft, nontender, no rigidity, no rebound tenderness or guarding, normal bowel sounds present  Musculoskeletal:  Equal movement of bilateral upper extremities.  Equal movement of bilateral lower extremities.  Normal gait.    Dermatological: no rashes noted of exposed skin  Neuro: Alert and oriented to person, place, and time.  " Cranial nerves II-XII grossly intact with no focal or lateralizing deficits.  Muscle tone normal.  Gait normal. No tremor.   Psychological: normal affect, alert, oriented, and pleasant.     Labs:  Results for orders placed or performed in visit on 03/04/24   Symptomatic Influenza A/B, RSV, & SARS-CoV2 PCR (COVID-19) Nose     Status: Abnormal    Specimen: Nose; Swab   Result Value Ref Range    Influenza A PCR Negative Negative    Influenza B PCR Positive (A) Negative    RSV PCR Negative Negative    SARS CoV2 PCR Negative Negative    Narrative    Testing was performed using the Xpert Xpress CoV2/Flu/RSV Assay on the Cepheid GeneXpert Instrument. This test should be ordered for the detection of SARS-CoV-2, influenza, and RSV viruses in individuals who meet clinical and/or epidemiological criteria. Test performance is unknown in asymptomatic patients. This test is for in vitro diagnostic use under the FDA EUA for laboratories certified under CLIA to perform high or moderate complexity testing. This test has not been FDA cleared or approved. A negative result does not rule out the presence of PCR inhibitors in the specimen or target RNA in concentration below the limit of detection for the assay. If only one viral target is positive but coinfection with multiple targets is suspected, the sample should be re-tested with another FDA cleared, approved, or authorized test, if coinfection would change clinical management. This test was validated by the [facility] [employer]. These laboratories are certified under the Clinical Laboratory Improvement Amendments of 1988 (CLIA-88) as qualified to perform high complexity laboratory testing.

## 2024-03-04 NOTE — NURSING NOTE
"Chief Complaint   Patient presents with    Headache     This am with nausea    Cough     Yesterday    Dizziness     Yesterday       Patient is treating with ibuprofen.  Requesting viral testing -     Initial /76 (BP Location: Right arm, Patient Position: Sitting, Cuff Size: Adult Regular)   Pulse 67   Temp 98.3  F (36.8  C) (Tympanic)   Resp 20   Ht 1.575 m (5' 2\")   Wt 52.6 kg (116 lb)   LMP 03/01/2024 (Exact Date)   SpO2 97%   BMI 21.22 kg/m   Estimated body mass index is 21.22 kg/m  as calculated from the following:    Height as of this encounter: 1.575 m (5' 2\").    Weight as of this encounter: 52.6 kg (116 lb).     Advance Care Directive on file? yes    FOOD SECURITY SCREENING QUESTIONS:    The next two questions are to help us understand your food security.  If you are feeling you need any assistance in this area, we have resources available to support you today.    Hunger Vital Signs:  Within the past 12 months we worried whether our food would run out before we got money to buy more. Never  Within the past 12 months the food we bought just didn't last and we didn't have money to get more. Never  Yulisa Rivas LPN,RENEA on 3/4/2024 at 1:19 PM      Yulisa Rivas LPN     "

## 2024-04-17 ENCOUNTER — OFFICE VISIT (OUTPATIENT)
Dept: FAMILY MEDICINE | Facility: OTHER | Age: 34
End: 2024-04-17
Payer: COMMERCIAL

## 2024-04-17 VITALS
TEMPERATURE: 99.4 F | HEART RATE: 74 BPM | BODY MASS INDEX: 21.27 KG/M2 | WEIGHT: 115.6 LBS | SYSTOLIC BLOOD PRESSURE: 110 MMHG | RESPIRATION RATE: 16 BRPM | HEIGHT: 62 IN | OXYGEN SATURATION: 97 % | DIASTOLIC BLOOD PRESSURE: 77 MMHG

## 2024-04-17 DIAGNOSIS — H61.21 IMPACTED CERUMEN OF RIGHT EAR: ICD-10-CM

## 2024-04-17 DIAGNOSIS — H65.02 NON-RECURRENT ACUTE SEROUS OTITIS MEDIA OF LEFT EAR: Primary | ICD-10-CM

## 2024-04-17 PROCEDURE — 69209 REMOVE IMPACTED EAR WAX UNI: CPT

## 2024-04-17 PROCEDURE — 99213 OFFICE O/P EST LOW 20 MIN: CPT

## 2024-04-17 ASSESSMENT — PAIN SCALES - GENERAL: PAINLEVEL: NO PAIN (0)

## 2024-04-17 NOTE — PROGRESS NOTES
Patient identified using two patient identifiers.  Ear exam showing wax occlusion completed by provider.  Solution: warm water was placed in the right ear(s) via irrigation tool: elephant ear.      Meli Hu LPN on 4/17/2024 at 10:48 AM

## 2024-04-17 NOTE — NURSING NOTE
"Chief Complaint   Patient presents with    Ear Problem     Both ears plugged       Initial LMP 04/01/2024 (Exact Date)  Estimated body mass index is 21.22 kg/m  as calculated from the following:    Height as of 3/4/24: 1.575 m (5' 2\").    Weight as of 3/4/24: 52.6 kg (116 lb).  Medication Review: complete    The next two questions are to help us understand your food security.  If you are feeling you need any assistance in this area, we have resources available to support you today.          12/11/2023   SDOH- Food Insecurity   Within the past 12 months, did you worry that your food would run out before you got money to buy more? N   Within the past 12 months, did the food you bought just not last and you didn t have money to get more? N         Health Care Directive:  Patient does not have a Health Care Directive or Living Will: Discussed advance care planning with patient; however, patient declined at this time.    Tereza Prasad CNA      "

## 2024-04-17 NOTE — PROGRESS NOTES
ASSESSMENT/PLAN:    I have reviewed the nursing notes.  I have reviewed the findings, diagnosis, plan and need for follow up with the patient.    1. Impacted cerumen of right ear    - HC REMOVAL IMPACTED CERUMEN IRRIGATION/LVG UNILAT- right ear    2. Non-recurrent acute serous otitis media of left ear    - Recommend daily antihistamine such as Claritin or Zyrtec along with Flonase as discussed in clinic today.      - May use over-the-counter Tylenol or ibuprofen PRN    - Discussed warning signs/symptoms indicative of need to f/u    - Follow up if symptoms persist or worsen or concerns    - I explained my diagnostic considerations and recommendations to the patient, who voiced understanding and agreement with the treatment plan. All questions were answered. We discussed potential side effects of any prescribed or recommended therapies, as well as expectations for response to treatments.    Radha Berger, ANNE MARIE CNP  4/17/2024  10:27 AM    HPI:    Noemí Garcia is a 33 year old female who presents to [facility] today for concerns of bilateral ears feeling plugged.  States that she has been putting debrox drops in right ear since Friday, attempted to flush yesterday with no results, states it feels worse.  Denies any cold symptoms.      Past Medical History:   Diagnosis Date    Abnormal Pap smear of cervix     Cancer (H)     Cerebral infarction (H)     Depressive disorder      Past Surgical History:   Procedure Laterality Date    AS REMOVE GROIN LYMPH NODES      BONE MARROW BIOPSY      COLONOSCOPY      COLPOSCOPY      COMBINED CYSTOSCOPY, URETEROSCOPY, LASER HOLMIUM LITHOTRIPSY URETER(S) Right 3/15/2022    Procedure: Right ureteroscopy with laser lithotripsy an stent placement;  Surgeon: Justo Jimenez MD;  Location:  OR    EXTRACORPOREAL SHOCK WAVE LITHOTRIPSY, CYSTOSCOPY, INSERT STENT URETER(S), COMBINED      GALLBLADDER SURGERY      HEAD & NECK SURGERY      cyst removal    KIDNEY STONE SURGERY      RHINOPLASTY    "    Social History     Tobacco Use    Smoking status: Former     Types: Vaping Device     Passive exposure: Past    Smokeless tobacco: Never    Tobacco comments:     12/23/20: 1-2 per day   Substance Use Topics    Alcohol use: Not Currently     Current Outpatient Medications   Medication Sig Dispense Refill    clindamycin-benzoyl peroxide (BENZACLIN) 1-5 % external gel Apply topically 2 times daily 50 g 1    Vitamin D3 (VITAMIN D, CHOLECALCIFEROL,) 25 mcg (1000 units) tablet Take 2,000 mcg by mouth daily 2000 international units - daily      ondansetron (ZOFRAN ODT) 4 MG ODT tab Take 1 tablet (4 mg) by mouth every 8 hours as needed for nausea (Patient not taking: Reported on 4/17/2024) 6 tablet 0     Allergies   Allergen Reactions    Adhesive Tape Other (See Comments)     Blistered skin     Past medical history, past surgical history, current medications and allergies reviewed and accurate to the best of my knowledge.      ROS:  Refer to HPI    /77 (BP Location: Right arm, Patient Position: Sitting, Cuff Size: Adult Regular)   Pulse 74   Temp 99.4  F (37.4  C) (Temporal)   Resp 16   Ht 1.575 m (5' 2\")   Wt 52.4 kg (115 lb 9.6 oz)   LMP 04/01/2024 (Exact Date)   SpO2 97%   BMI 21.14 kg/m      EXAM:  General Appearance: Well appearing 33 year old female, appropriate appearance for age. No acute distress   Ears: Left TM intact, translucent with bony landmarks appreciated, mild erythema, + effusion, no bulging, no purulence.  After ear irrigation, Right TM intact, translucent with bony landmarks appreciated, no erythema, no effusion, no bulging, no purulence.  Left auditory canal clear.  Right auditory canal clear.  Normal external ears, non tender.  Eyes: conjunctivae normal without erythema or irritation, corneas clear, no drainage or crusting, no eyelid swelling, pupils equal   Respiratory: normal chest wall and respirations.  Normal effort.  Clear to auscultation bilaterally, no wheezing, crackles or " rhonchi.  No increased work of breathing.  No cough appreciated.  Cardiac: RRR with no murmurs   Musculoskeletal:  Equal movement of bilateral upper extremities.  Equal movement of bilateral lower extremities.  Normal gait.    Neuro: Alert and oriented to person, place, and time.    Psychological: normal affect, alert, oriented, and pleasant.

## 2024-04-18 NOTE — PROGRESS NOTES
02/02/24 0500   Appointment Info   Signing clinician's name / credentials Reinaldo Carusojelani, PT   Visits Used 1   Medical Diagnosis Abnormal MRI, cervical spine (R93.7)   PT Tx Diagnosis Cervical/thoracic pain, impaired range of motion, postural dysfunction, weakness   Progress Note/Certification   Start of Care Date 02/02/24   Onset of illness/injury or Date of Surgery   (Symptoms of present over the past few years.)   Therapy Frequency 8 visits   Predicted Duration 8 weeks   GOALS   PT Goals 2;3   PT Goal 1   Goal Identifier Pain   Goal Description Patient will report neck pain at a 1/10 or less when performing all nursing duties.   Rationale to maximize safety and independence with performance of ADLs and functional tasks;to maximize safety and independence within the community   Target Date 03/29/24   PT Goal 2   Goal Identifier Range of motion   Goal Description Patient will improve cervical flexion and extension by 10 degrees with no reports of stiffness to allow looking up and down at work and at home without difficulty.   Rationale to maximize safety and independence with performance of ADLs and functional tasks;to maximize safety and independence within the home;to maximize safety and independence within the community;to maximize safety and independence with self cares   Target Date 03/29/24   PT Goal 3   Goal Identifier Weakness   Goal Description Patient will report and demonstrate improved sitting posture in standing posture in order to decrease neck and upper back pain..   Rationale to maximize safety and independence with performance of ADLs and functional tasks;to maximize safety and independence within the home;to maximize safety and independence within the community;to maximize safety and independence with self cares   Target Date 03/29/24   Subjective Report   Subjective Report See eval   Treatment Interventions (PT)   Interventions Therapeutic Procedure/Exercise   Therapeutic Procedure/Exercise    Therapeutic Procedures: strength, endurance, ROM, flexibility minutes (02053) 15   Ther Proc 1 - Details Discussed current symptoms and PT exam findings.  Discussed to continue working on self awareness of posture to decrease stresses on the neck and back.  Discussed performance of exercises in a pain-free manner.  Exercises: Row with red band x 10.  Shoulder extension with scapular retraction with red band x 10.-Perform once a day or as symptoms allow.   Skilled Intervention Education, HEP, range of motion, strengthening   Patient Response/Progress Patient verbalized and demonstrated understanding of home exercise program.   Eval/Assessments   PT Eval, Low Complexity Minutes (88144) 20   Education   Learner/Method Patient;Listening;Reading;Demonstration;Pictures/Video;No Barriers to Learning   Plan   Home program Row with red band x 10. Shoulder extension with scapular retraction with red band x 10.-Perform once a day or as symptoms allow.   Plan for next session Progress exercise as symptoms allow.  Manual therapy and modalities as indicated.   Total Session Time   Timed Code Treatment Minutes 15   Total Treatment Time (sum of timed and untimed services) 35         DISCHARGE  Reason for Discharge: Patient was seen for initial evaluation only.      Discharge Plan: Patient to continue home program.    Referring Provider:  Ritika Contreras

## 2024-07-14 ENCOUNTER — HEALTH MAINTENANCE LETTER (OUTPATIENT)
Age: 34
End: 2024-07-14

## 2024-10-20 ENCOUNTER — LAB REQUISITION (OUTPATIENT)
Dept: LAB | Facility: OTHER | Age: 34
End: 2024-10-20

## 2024-10-20 LAB — SARS-COV-2 RNA RESP QL NAA+PROBE: NEGATIVE

## 2024-10-20 PROCEDURE — 87635 SARS-COV-2 COVID-19 AMP PRB: CPT | Performed by: FAMILY MEDICINE

## 2024-11-07 ENCOUNTER — OFFICE VISIT (OUTPATIENT)
Dept: FAMILY MEDICINE | Facility: OTHER | Age: 34
End: 2024-11-07
Attending: PHYSICIAN ASSISTANT
Payer: COMMERCIAL

## 2024-11-07 VITALS
DIASTOLIC BLOOD PRESSURE: 69 MMHG | SYSTOLIC BLOOD PRESSURE: 117 MMHG | OXYGEN SATURATION: 98 % | BODY MASS INDEX: 21.88 KG/M2 | HEART RATE: 58 BPM | WEIGHT: 119.6 LBS | TEMPERATURE: 98.1 F

## 2024-11-07 DIAGNOSIS — Z91.012 EGG ALLERGY: Primary | ICD-10-CM

## 2024-11-07 DIAGNOSIS — Z85.72 HISTORY OF LYMPHOMA: ICD-10-CM

## 2024-11-07 DIAGNOSIS — R11.2 NAUSEA AND VOMITING, UNSPECIFIED VOMITING TYPE: ICD-10-CM

## 2024-11-07 DIAGNOSIS — Z13.220 SCREENING CHOLESTEROL LEVEL: ICD-10-CM

## 2024-11-07 DIAGNOSIS — R11.0 NAUSEA: ICD-10-CM

## 2024-11-07 LAB
ALBUMIN SERPL BCG-MCNC: 4.7 G/DL (ref 3.5–5.2)
ALP SERPL-CCNC: 66 U/L (ref 40–150)
ALT SERPL W P-5'-P-CCNC: 20 U/L (ref 0–50)
ANION GAP SERPL CALCULATED.3IONS-SCNC: 9 MMOL/L (ref 7–15)
AST SERPL W P-5'-P-CCNC: 19 U/L (ref 0–45)
BASOPHILS # BLD AUTO: 0 10E3/UL (ref 0–0.2)
BASOPHILS NFR BLD AUTO: 0 %
BILIRUB SERPL-MCNC: 0.5 MG/DL
BUN SERPL-MCNC: 11.3 MG/DL (ref 6–20)
CALCIUM SERPL-MCNC: 10 MG/DL (ref 8.8–10.4)
CHLORIDE SERPL-SCNC: 104 MMOL/L (ref 98–107)
CHOLEST SERPL-MCNC: 147 MG/DL
CREAT SERPL-MCNC: 0.71 MG/DL (ref 0.51–0.95)
CRP SERPL-MCNC: <3 MG/L
EGFRCR SERPLBLD CKD-EPI 2021: >90 ML/MIN/1.73M2
EOSINOPHIL # BLD AUTO: 1.1 10E3/UL (ref 0–0.7)
EOSINOPHIL NFR BLD AUTO: 17 %
ERYTHROCYTE [DISTWIDTH] IN BLOOD BY AUTOMATED COUNT: 12.1 % (ref 10–15)
ERYTHROCYTE [SEDIMENTATION RATE] IN BLOOD BY WESTERGREN METHOD: 1 MM/HR (ref 0–20)
FASTING STATUS PATIENT QL REPORTED: NO
FASTING STATUS PATIENT QL REPORTED: NO
GLUCOSE SERPL-MCNC: 88 MG/DL (ref 70–99)
HCO3 SERPL-SCNC: 27 MMOL/L (ref 22–29)
HCT VFR BLD AUTO: 43.7 % (ref 35–47)
HDLC SERPL-MCNC: 58 MG/DL
HGB BLD-MCNC: 14.3 G/DL (ref 11.7–15.7)
IMM GRANULOCYTES # BLD: 0 10E3/UL
IMM GRANULOCYTES NFR BLD: 0 %
LDLC SERPL CALC-MCNC: 75 MG/DL
LYMPHOCYTES # BLD AUTO: 2.1 10E3/UL (ref 0.8–5.3)
LYMPHOCYTES NFR BLD AUTO: 32 %
MCH RBC QN AUTO: 28.7 PG (ref 26.5–33)
MCHC RBC AUTO-ENTMCNC: 32.7 G/DL (ref 31.5–36.5)
MCV RBC AUTO: 88 FL (ref 78–100)
MONOCYTES # BLD AUTO: 0.4 10E3/UL (ref 0–1.3)
MONOCYTES NFR BLD AUTO: 6 %
NEUTROPHILS # BLD AUTO: 2.9 10E3/UL (ref 1.6–8.3)
NEUTROPHILS NFR BLD AUTO: 44 %
NONHDLC SERPL-MCNC: 89 MG/DL
NRBC # BLD AUTO: 0 10E3/UL
NRBC BLD AUTO-RTO: 0 /100
PLATELET # BLD AUTO: 257 10E3/UL (ref 150–450)
POTASSIUM SERPL-SCNC: 4.7 MMOL/L (ref 3.4–5.3)
PROT SERPL-MCNC: 7.4 G/DL (ref 6.4–8.3)
RBC # BLD AUTO: 4.98 10E6/UL (ref 3.8–5.2)
SODIUM SERPL-SCNC: 140 MMOL/L (ref 135–145)
TRIGL SERPL-MCNC: 72 MG/DL
TSH SERPL DL<=0.005 MIU/L-ACNC: 0.98 UIU/ML (ref 0.3–4.2)
WBC # BLD AUTO: 6.5 10E3/UL (ref 4–11)

## 2024-11-07 PROCEDURE — 36415 COLL VENOUS BLD VENIPUNCTURE: CPT | Mod: ZL | Performed by: PHYSICIAN ASSISTANT

## 2024-11-07 PROCEDURE — 84443 ASSAY THYROID STIM HORMONE: CPT | Mod: ZL | Performed by: PHYSICIAN ASSISTANT

## 2024-11-07 PROCEDURE — 85004 AUTOMATED DIFF WBC COUNT: CPT | Mod: ZL | Performed by: PHYSICIAN ASSISTANT

## 2024-11-07 PROCEDURE — 86140 C-REACTIVE PROTEIN: CPT | Mod: ZL | Performed by: PHYSICIAN ASSISTANT

## 2024-11-07 PROCEDURE — 85652 RBC SED RATE AUTOMATED: CPT | Mod: ZL | Performed by: PHYSICIAN ASSISTANT

## 2024-11-07 PROCEDURE — 86003 ALLG SPEC IGE CRUDE XTRC EA: CPT | Mod: ZL | Performed by: PHYSICIAN ASSISTANT

## 2024-11-07 PROCEDURE — 80061 LIPID PANEL: CPT | Mod: ZL | Performed by: PHYSICIAN ASSISTANT

## 2024-11-07 PROCEDURE — G2211 COMPLEX E/M VISIT ADD ON: HCPCS | Performed by: PHYSICIAN ASSISTANT

## 2024-11-07 PROCEDURE — 82040 ASSAY OF SERUM ALBUMIN: CPT | Mod: ZL | Performed by: PHYSICIAN ASSISTANT

## 2024-11-07 PROCEDURE — 99214 OFFICE O/P EST MOD 30 MIN: CPT | Performed by: PHYSICIAN ASSISTANT

## 2024-11-07 PROCEDURE — 82306 VITAMIN D 25 HYDROXY: CPT | Mod: ZL | Performed by: PHYSICIAN ASSISTANT

## 2024-11-07 RX ORDER — ONDANSETRON 4 MG/1
4 TABLET, ORALLY DISINTEGRATING ORAL EVERY 8 HOURS PRN
Qty: 20 TABLET | Refills: 3 | Status: SHIPPED | OUTPATIENT
Start: 2024-11-07

## 2024-11-07 NOTE — NURSING NOTE
"Chief Complaint   Patient presents with    Gastrointestinal Problem     Patient has been having nausea and vomiting and abdominal pain after eating sometimes. This has been happening off and on for about month or so. She narrowed it down to eggs.  Initial /69   Pulse 58   Temp 98.1  F (36.7  C) (Tympanic)   Wt 54.3 kg (119 lb 9.6 oz)   LMP 11/01/2024 (Exact Date)   SpO2 98%   BMI 21.88 kg/m   Estimated body mass index is 21.88 kg/m  as calculated from the following:    Height as of 4/17/24: 1.575 m (5' 2\").    Weight as of this encounter: 54.3 kg (119 lb 9.6 oz).  Medication Review: complete    The next two questions are to help us understand your food security.  If you are feeling you need any assistance in this area, we have resources available to support you today.          4/17/2024   SDOH- Food Insecurity   Within the past 12 months, did you worry that your food would run out before you got money to buy more? N   Within the past 12 months, did the food you bought just not last and you didn t have money to get more? N            Health Care Directive:  Patient does not have a Health Care Directive: Discussed advance care planning with patient; however, patient declined at this time.    Lety Dominguez MA      "

## 2024-11-07 NOTE — PROGRESS NOTES
Assessment & Plan   Problem List Items Addressed This Visit          Other    History of lymphoma    Relevant Orders    TSH Reflex GH (Completed)    CBC and Differential (Completed)    Comprehensive Metabolic Panel (Completed)    Sedimentation Rate (ESR) (Completed)    CRP inflammation (Completed)    Adult Oncology/Hematology  Referral     Other Visit Diagnoses       Egg allergy    -  Primary    Nausea        Relevant Medications    ondansetron (ZOFRAN ODT) 4 MG ODT tab    Other Relevant Orders    Allergy adult food panel    Vitamin D Total    Nausea and vomiting, unspecified vomiting type        Relevant Orders    Allergy adult food panel    Vitamin D Total    Screening cholesterol level        Relevant Orders    Lipid Panel (Completed)           Completed cholesterol for screening.  Lab is normal.  Encourage good diet and exercise.    Nausea and vomiting: Patient has been found to have an egg allergy.  Encouraged hold off on intake of eggs.  Can eat cooked egg products such as baked goods as tolerated.  Encouraged to hold off on the flu vaccine due to side effects from the flu vaccine over the last 2 years along with current egg allergy.  Patient was given a letter stating that she should hold off on getting the flu vaccine.    History lymphoma: Completed CBC, CMP, TSH, ESR, and CRP for monitoring.  Labs are stable.  Referred to oncology/hematology to discuss monitoring needs and recommendations.  Currently asymptomatic.     The longitudinal plan of care for the diagnosis(es)/condition(s) as documented were addressed during this visit. Due to the added complexity in care, I will continue to support Noemí in the subsequent management and with ongoing continuity of care.    See Patient Instructions    No follow-ups on file.      Dwight Dowd is a 34 year old, presenting for the following health issues:  Gastrointestinal Problem        11/7/2024     3:56 PM   Additional Questions   Roomed by Lety JAY  CMA     History of Present Illness       Reason for visit:  Abdominal pain vomiting  Symptom onset:  More than a month   She is taking medications regularly.     Patient is coming today with abdominal pain and vomiting in the last month.  Concerned that it is due to eggs.  Had a few instances where she would eat eggs and have severe stomach pain afterwards.  Will get epigastric and upper abdominal pain that was sharp along with vomiting.  Started within 15 to 20 minutes after eating eggs.  Would last for a few hours.  Has happened with scrambled and hard-boiled eggs.  Not with cooked products.  Chest will get tight fairly soon after eating the eggs and then she would start throwing up.  Quit eating eggs and has felt better.  Has been sensitive to other foods such as tomatoes, sweet peppers, and lettuce where she would get diarrhea.  Has gotten sick the last 2 years after having the flu vaccine.    Patient has history of Hodgkin's lymphoma.  Currently feeling well.  Has been getting yearly blood work for monitoring.  No fevers, chills, cough or cold symptoms, weight changes.  Would like to complete yearly monitoring.  Currently asymptomatic.      Review of Systems  Constitutional, neuro, ENT, endocrine, pulmonary, cardiac, gastrointestinal, genitourinary, musculoskeletal, integument and psychiatric systems are negative, except as otherwise noted.      Objective    /69   Pulse 58   Temp 98.1  F (36.7  C) (Tympanic)   Wt 54.3 kg (119 lb 9.6 oz)   LMP 11/01/2024 (Exact Date)   SpO2 98%   BMI 21.88 kg/m    Body mass index is 21.88 kg/m .  Physical Exam  Vitals and nursing note reviewed.   Constitutional:       General: She is not in acute distress.     Appearance: Normal appearance. She is well-developed.   Cardiovascular:      Rate and Rhythm: Normal rate and regular rhythm.      Heart sounds: Normal heart sounds, S1 normal and S2 normal. No murmur heard.  Pulmonary:      Effort: Pulmonary effort is normal.  No respiratory distress.      Breath sounds: Normal breath sounds. No wheezing or rales.   Abdominal:      General: Abdomen is flat. Bowel sounds are normal.      Palpations: Abdomen is soft. There is no mass.      Tenderness: There is no abdominal tenderness. There is no right CVA tenderness, left CVA tenderness, guarding or rebound.      Hernia: No hernia is present.   Musculoskeletal:         General: Normal range of motion.   Skin:     General: Skin is warm and dry.      Findings: No rash.   Neurological:      General: No focal deficit present.      Mental Status: She is alert and oriented to person, place, and time.   Psychiatric:         Mood and Affect: Mood normal.         Behavior: Behavior normal.         Thought Content: Thought content normal.         Judgment: Judgment normal.          Results for orders placed or performed in visit on 11/07/24   TSH Reflex GH     Status: Normal   Result Value Ref Range    TSH 0.98 0.30 - 4.20 uIU/mL   Comprehensive Metabolic Panel     Status: None   Result Value Ref Range    Sodium 140 135 - 145 mmol/L    Potassium 4.7 3.4 - 5.3 mmol/L    Carbon Dioxide (CO2) 27 22 - 29 mmol/L    Anion Gap 9 7 - 15 mmol/L    Urea Nitrogen 11.3 6.0 - 20.0 mg/dL    Creatinine 0.71 0.51 - 0.95 mg/dL    GFR Estimate >90 >60 mL/min/1.73m2    Calcium 10.0 8.8 - 10.4 mg/dL    Chloride 104 98 - 107 mmol/L    Glucose 88 70 - 99 mg/dL    Alkaline Phosphatase 66 40 - 150 U/L    AST 19 0 - 45 U/L    ALT 20 0 - 50 U/L    Protein Total 7.4 6.4 - 8.3 g/dL    Albumin 4.7 3.5 - 5.2 g/dL    Bilirubin Total 0.5 <=1.2 mg/dL    Patient Fasting > 8hrs? No    Sedimentation Rate (ESR)     Status: Normal   Result Value Ref Range    Erythrocyte Sedimentation Rate 1 0 - 20 mm/hr   CRP inflammation     Status: Normal   Result Value Ref Range    CRP Inflammation <3.00 <5.00 mg/L   Lipid Panel     Status: None   Result Value Ref Range    Cholesterol 147 <200 mg/dL    Triglycerides 72 <150 mg/dL    Direct Measure  HDL 58 >=50 mg/dL    LDL Cholesterol Calculated 75 <100 mg/dL    Non HDL Cholesterol 89 <130 mg/dL    Patient Fasting > 8hrs? No     Narrative    Cholesterol  Desirable: < 200 mg/dL  Borderline High: 200 - 239 mg/dL  High: >= 240 mg/dL    Triglycerides  Normal: < 150 mg/dL  Borderline High: 150 - 199 mg/dL  High: 200-499 mg/dL  Very High: >= 500 mg/dL    Direct Measure HDL  Female: >= 50 mg/dL   Male: >= 40 mg/dL    LDL Cholesterol  Desirable: < 100 mg/dL  Above Desirable: 100 - 129 mg/dL   Borderline High: 130 - 159 mg/dL   High:  160 - 189 mg/dL   Very High: >= 190 mg/dL    Non HDL Cholesterol  Desirable: < 130 mg/dL  Above Desirable: 130 - 159 mg/dL  Borderline High: 160 - 189 mg/dL  High: 190 - 219 mg/dL  Very High: >= 220 mg/dL   CBC with platelets and differential     Status: Abnormal   Result Value Ref Range    WBC Count 6.5 4.0 - 11.0 10e3/uL    RBC Count 4.98 3.80 - 5.20 10e6/uL    Hemoglobin 14.3 11.7 - 15.7 g/dL    Hematocrit 43.7 35.0 - 47.0 %    MCV 88 78 - 100 fL    MCH 28.7 26.5 - 33.0 pg    MCHC 32.7 31.5 - 36.5 g/dL    RDW 12.1 10.0 - 15.0 %    Platelet Count 257 150 - 450 10e3/uL    % Neutrophils 44 %    % Lymphocytes 32 %    % Monocytes 6 %    % Eosinophils 17 %    % Basophils 0 %    % Immature Granulocytes 0 %    NRBCs per 100 WBC 0 <1 /100    Absolute Neutrophils 2.9 1.6 - 8.3 10e3/uL    Absolute Lymphocytes 2.1 0.8 - 5.3 10e3/uL    Absolute Monocytes 0.4 0.0 - 1.3 10e3/uL    Absolute Eosinophils 1.1 (H) 0.0 - 0.7 10e3/uL    Absolute Basophils 0.0 0.0 - 0.2 10e3/uL    Absolute Immature Granulocytes 0.0 <=0.4 10e3/uL    Absolute NRBCs 0.0 10e3/uL   CBC and Differential     Status: Abnormal    Narrative    The following orders were created for panel order CBC and Differential.  Procedure                               Abnormality         Status                     ---------                               -----------         ------                     CBC with platelets and d...[[id number]]  Abnormal             Final result                 Please view results for these tests on the individual orders.           Signed Electronically by: Ritika Contreras PA-C

## 2024-11-07 NOTE — LETTER
2024    Noemí Garcia   1990        To Whom it May Concern;    The patient Noemí Garcia ( 1990) was seen in clinic on 2024.    She may not receive a influenza vaccine due to her egg allergy and not tolerating the vaccine in the past.     Sincerely,        Ritika Contreras PA-C

## 2024-11-08 ENCOUNTER — PATIENT OUTREACH (OUTPATIENT)
Dept: ONCOLOGY | Facility: OTHER | Age: 34
End: 2024-11-08
Payer: COMMERCIAL

## 2024-11-08 LAB — VIT D+METAB SERPL-MCNC: 29 NG/ML (ref 20–50)

## 2024-11-08 NOTE — PROGRESS NOTES
Patient called and is ready to schedule consult     Oncology/Hematology Care Coordination - Referral Review    Referred by:  Ritika Contreras    Diagnosis:  History Hodgkin's lymphoma: Completed CBC, CMP, TSH, ESR, and CRP for monitoring. Labs are stable. Has been getting yearly lab work for monitoring.  Most recent Imaging:  CT neck 9/1/2020/[facility]    Most recent Lab: 11/7/24     Surgery/Biopsy:  lymph node biopsy 5/28/13 6/7/23 [facility]MB results in [facility]    Pathology:  available in [facility]     Outside Records:  [facility]-records in both [place] and [facility]. [facility]      Patient states that she was diagnosed at [facility] in [place] May 2013. She was treated with Adriamycin, Bleomycin, Vinblastine, Decarbazine given once every 2 weeks     Last visit with oncology (Dr. Mathur) 1/25/19        PROGRESS NOTE        ASSESSMENT:    1)  Clinical stage IIA Hodgkin's lymphoma.  No  evidence of disease recurrence on exam.  It is now about 5 1/2 yrs from her diagnosis. I will see her back in a year     2) depression.  She is seeing Dr. Hearn     Flu shot today     DIAGNOSIS:  Hodgkin's lymphoma, nodular sclerosis, stage IIA.  Date of diagnosis 5/28/13 through right supraclavicular needle biopsy.       PREVIOUS THERAPY:  1) She had a PET CT scan done on 6/5/13 which showed a large supraclavicular lymph node with increased FDG uptake with two other smaller right and left level 5 lymph nodes and large right peritracheal lymph node with increased FDG uptake in smaller lymph node in aortopulmonic window.  No other abnormalities.  Bone marrow aspiration and biopsy did not show involvement.  2) Status post port placement by Dr. Landers on 6/10/13 in the left subclavian.  3) 4 cycles of ABVD, last dose received on 9/17/13, CR on restaging pet/ct.             Images will be requested to be pushed to [place] Asher RN on 11/8/2024 at 3:14 PM

## 2024-11-11 LAB
ALMOND IGE QN: <0.1 KU(A)/L
CASHEW NUT IGE QN: <0.1 KU(A)/L
CODFISH IGE QN: <0.1 KU(A)/L
COW MILK IGE QN: <0.1 KU(A)/L
EGG WHITE IGE QN: 0.51 KU(A)/L
HAZELNUT IGE QN: <0.1 KU(A)/L
IGE SERPL-ACNC: 19 KU/L (ref 0–114)
PEANUT IGE QN: <0.1 KU(A)/L
SALMON IGE QN: <0.1 KU(A)/L
SCALLOP IGE QN: <0.1 KU(A)/L
SESAME SEED IGE QN: <0.1 KU(A)/L
SHRIMP IGE QN: <0.1 KU(A)/L
SOYBEAN IGE QN: <0.1 KU(A)/L
TUNA IGE QN: <0.1 KU(A)/L
WALNUT IGE QN: <0.1 KU(A)/L
WHEAT IGE QN: <0.1 KU(A)/L

## 2024-12-10 ENCOUNTER — ONCOLOGY VISIT (OUTPATIENT)
Dept: ONCOLOGY | Facility: OTHER | Age: 34
End: 2024-12-10
Attending: PHYSICIAN ASSISTANT
Payer: COMMERCIAL

## 2024-12-10 VITALS
SYSTOLIC BLOOD PRESSURE: 104 MMHG | BODY MASS INDEX: 21.91 KG/M2 | TEMPERATURE: 97.7 F | RESPIRATION RATE: 12 BRPM | OXYGEN SATURATION: 99 % | WEIGHT: 119.8 LBS | HEART RATE: 84 BPM | DIASTOLIC BLOOD PRESSURE: 76 MMHG

## 2024-12-10 DIAGNOSIS — Z85.72 HISTORY OF LYMPHOMA: ICD-10-CM

## 2024-12-10 DIAGNOSIS — C81.18 NODULAR SCLEROSIS HODGKIN LYMPHOMA OF LYMPH NODES OF MULTIPLE REGIONS (H): Primary | ICD-10-CM

## 2024-12-10 DIAGNOSIS — Z92.21 STATUS POST ADMINISTRATION OF CARDIOTOXIC CHEMOTHERAPY: ICD-10-CM

## 2024-12-10 LAB
BASOPHILS # BLD AUTO: 0 10E3/UL (ref 0–0.2)
BASOPHILS NFR BLD AUTO: 1 %
EOSINOPHIL # BLD AUTO: 0.2 10E3/UL (ref 0–0.7)
EOSINOPHIL NFR BLD AUTO: 3 %
ERYTHROCYTE [DISTWIDTH] IN BLOOD BY AUTOMATED COUNT: 12.1 % (ref 10–15)
HCT VFR BLD AUTO: 40 % (ref 35–47)
HGB BLD-MCNC: 13.4 G/DL (ref 11.7–15.7)
HOLD SPECIMEN: NORMAL
IMM GRANULOCYTES # BLD: 0 10E3/UL
IMM GRANULOCYTES NFR BLD: 0 %
LDH SERPL L TO P-CCNC: 142 U/L (ref 0–250)
LYMPHOCYTES # BLD AUTO: 1.7 10E3/UL (ref 0.8–5.3)
LYMPHOCYTES NFR BLD AUTO: 28 %
MCH RBC QN AUTO: 28.9 PG (ref 26.5–33)
MCHC RBC AUTO-ENTMCNC: 33.5 G/DL (ref 31.5–36.5)
MCV RBC AUTO: 86 FL (ref 78–100)
MONOCYTES # BLD AUTO: 0.4 10E3/UL (ref 0–1.3)
MONOCYTES NFR BLD AUTO: 7 %
NEUTROPHILS # BLD AUTO: 3.7 10E3/UL (ref 1.6–8.3)
NEUTROPHILS NFR BLD AUTO: 62 %
NRBC # BLD AUTO: 0 10E3/UL
NRBC BLD AUTO-RTO: 0 /100
PLATELET # BLD AUTO: 280 10E3/UL (ref 150–450)
RBC # BLD AUTO: 4.63 10E6/UL (ref 3.8–5.2)
RETICS # AUTO: 0.05 10E6/UL (ref 0.03–0.1)
RETICS/RBC NFR AUTO: 1.2 % (ref 0.5–2)
WBC # BLD AUTO: 6 10E3/UL (ref 4–11)

## 2024-12-10 PROCEDURE — 83615 LACTATE (LD) (LDH) ENZYME: CPT | Mod: ZL | Performed by: INTERNAL MEDICINE

## 2024-12-10 PROCEDURE — 85004 AUTOMATED DIFF WBC COUNT: CPT | Mod: ZL | Performed by: INTERNAL MEDICINE

## 2024-12-10 PROCEDURE — 85045 AUTOMATED RETICULOCYTE COUNT: CPT | Mod: ZL | Performed by: INTERNAL MEDICINE

## 2024-12-10 PROCEDURE — 36415 COLL VENOUS BLD VENIPUNCTURE: CPT | Mod: ZL | Performed by: INTERNAL MEDICINE

## 2024-12-10 PROCEDURE — 85018 HEMOGLOBIN: CPT | Mod: ZL | Performed by: INTERNAL MEDICINE

## 2024-12-10 ASSESSMENT — PAIN SCALES - GENERAL: PAINLEVEL_OUTOF10: NO PAIN (0)

## 2024-12-10 NOTE — NURSING NOTE
"Oncology Rooming Note    December 10, 2024 9:57 AM   Noemí Garcia is a 34 year old female who presents for:    Chief Complaint   Patient presents with    Oncology Clinic Visit    Consult     History of Lymphoma - 2013 at [facility] / Consult per Alexia Contreras PA-C for elevated eosinophils (recently discovered egg allergy)     Initial Vitals: /76 (BP Location: Right arm, Patient Position: Sitting, Cuff Size: Adult Regular)   Pulse 84   Temp 97.7  F (36.5  C) (Tympanic)   Resp 12   Wt 54.3 kg (119 lb 12.8 oz)   LMP 11/28/2024 (Approximate)   SpO2 99%   BMI 21.91 kg/m   Estimated body mass index is 21.91 kg/m  as calculated from the following:    Height as of 4/17/24: 1.575 m (5' 2\").    Weight as of this encounter: 54.3 kg (119 lb 12.8 oz). Body surface area is 1.54 meters squared.  No Pain (0) Comment: Data Unavailable   Patient's last menstrual period was 11/28/2024 (approximate).  Allergies reviewed: Yes  Medications reviewed: Yes    Medications: Medication refills not needed today.  Pharmacy name entered into [employer]:    [facility] - [facility] [place], [place] - [address]  [facility] - [place], [place] - [address]    Frailty Screening:   Is the patient here for a new oncology consult visit in cancer care? 1. Yes. Over the past month, have you experienced difficulty or required a caregiver to assist with:   1. Balance, walking or general mobility (including any falls)? NO  2. Completion of self-care tasks such as bathing, dressing, toileting, grooming/hygiene?  NO  3. Concentration or memory that affects your daily life?  NO     Clinical concerns: recently had elevated eosinophils but discovered egg allergy at the same time.      Ruth Vega CMA ([facility]) 12/10/2024 9:57 AM  "

## 2024-12-10 NOTE — PROGRESS NOTES
[facility] Hematology and Oncology Consult Note    Patient: Noemí Garcia  MRN: [id number]  Date of Service: Dec 10, 2024       Reason for Visit    I was consulted by ALICE Calzada      ECOG Performance Status  0    Encounter Diagnoses: Stage IIa nodular sclerosing Hodgkin's lymphoma    Problem List Items Addressed This Visit          Oncology Diagnoses    Hodgkin's disease of lymph nodes of multiple sites (H) - Primary    Relevant Orders    Lactate Dehydrogenase    Lab Blood Morphology Pathologist Review    PET Oncology (Eyes to Thighs)    History of lymphoma     Other Visit Diagnoses       Status post administration of cardiotoxic chemotherapy        Relevant Orders    Echocardiogram Complete                ______________________________________________________________________________    Staging History  Cancer Staging   No matching staging information was found for the patient.        History of Present Illness    Ms. Noemí Garcia is a 34 year old white female with a history of nephrolithiasis, bipolar disorder, who we are asked to evaluate concerning diagnosis of stage IIa nodular sclerosing Hodgkin's lymphoma.The patient had been seen by Ritika JENKINS on November 7, 2024 with complaints of nausea and vomiting abdominal pain after eating eggs she was found to have eggs allergy and also it was noted that she had eosinophilia on her CBC.  Her hemoglobin was normal her white count was normal and platelet count was normal.  She did have a history of stage IIa nodular sclerosing Hodgkin's disease had been followed at the [facility] yearly for follow-up but had not seen anybody since 2021.  She moved from [place] to [place] 4 years ago.  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  She has  and 4 children.  According to the patient she had developed left neck swelling during a pregnancy back in 2013 while living in  [place].  At that time she was seen by the [facility] in [place] and underwent a super clavicular node biopsy that came back positive for nodular sclerosing Hodgkin's disease this was on May 28, 2013.  PET scan was performed on June 5, 2013 which revealed a large supraclavicular lymph node increased FDG uptake with 2 other smaller right and left level 5 cervical nodes enlarged right paratracheal lymph node with increased FDG uptake and a small lymph node in the aortopulmonary window.  Pulmonary aspiration biopsy was negative for lymphoma she had a port placed on Yulisa 10, 2013 and the left subclavian vein and underwent treatment with ABVD she received at least 2 cycles ABVD the last 2 cycles bleomycin was omitted due to pulmonary symptoms her last dose was given on September 17, 2013 subsequent PET/CT was consistent with complete response subsequent to that her scans continue to show remission.  According the patient she was pregnant during diagnosis and was unclear if she had B symptoms but the hematologist felt she had stage IIa nodular sclerosing Hodgkin's lymphoma.  She did not receive any radiation therapy..  She also underwent cholecystectomy in 2015 for abnormal gallbladder was found to have a gallbladder polyp she does not have any gallstones.  She also has a history of nephrolithiasis.  She had recently been seen at [facility] for complaints of right neck swelling and a CT neck was negative this was in September 2020.  Of note she also had been admitted in 2017 with overdose of Benadryl, Effexor and lorazepam with intent of self-harm.  Poison control was involved in her care she was monitored for seizures and fever as well as widening of QRS.  Her ICU stage showed her to be medical stable.  She was placed on 72-hour hold and subsequently transferred to [facility] in [place] for further mental health care.  She carries a diagnosis of bipolar disorder as well  as generalized anxiety disorder.  Of note she had undergone a PET scan in October 2013 and the findings were that there was some uptake in monitoring nodes she underwent inguinal node dissection and this came back negative for malignancy.  She comes today for follow-up of her Hodgkin's lymphoma.  Her current symptoms include nausea which may or may not be related to recent diagnosed egg allergy she is also vomited a few times.  She also complains of a lymph node in the left neck.  She denies B symptoms.  She denies fevers, night sweats or weight loss.  She has intermittent constipation but denies bright red blood per rectum hematemesis or melena.  She denies cough, shortness of breath or hemoptysis.  Of note she was allergic to flu shot last year which is likely due to recently diagnosed egg allergy.  She denies hematuria or flank pain.  She does get occasional headaches which she attributes to her work schedule working primarily night shifts.  She denies any change in mental status she was a former smoker smoking less than 1/2 pack/day for 15 years she quit 4 years ago.  She also notes that there is a family history of lymphoma with primary great grandfather and great uncles having lymphoma of unknown type.        Medications:    Current Outpatient Medications   Medication Sig Dispense Refill    ondansetron (ZOFRAN ODT) 4 MG ODT tab Take 1 tablet (4 mg) by mouth every 8 hours as needed for nausea. 20 tablet 3    Vitamin D3 (VITAMIN D, CHOLECALCIFEROL,) 25 mcg (1000 units) tablet Take 2,000 mcg by mouth daily 2000 international units - daily       No current facility-administered medications for this visit.           Past History    Past Medical History:   Diagnosis Date    Abnormal Pap smear of cervix     Cancer (H)     Cerebral infarction (H)     Depressive disorder      Past Surgical History:   Procedure Laterality Date    AS REMOVE GROIN LYMPH NODES      BONE MARROW BIOPSY      COLONOSCOPY      COLPOSCOPY       COMBINED CYSTOSCOPY, URETEROSCOPY, LASER HOLMIUM LITHOTRIPSY URETER(S) Right 3/15/2022    Procedure: Right ureteroscopy with laser lithotripsy an stent placement;  Surgeon: Justo Jimenez MD;  Location: [facility] OR    EXTRACORPOREAL SHOCK WAVE LITHOTRIPSY, CYSTOSCOPY, INSERT STENT URETER(S), COMBINED      GALLBLADDER SURGERY      HEAD & NECK SURGERY      cyst removal    KIDNEY STONE SURGERY      RHINOPLASTY       Allergies   Allergen Reactions    Chicken-Derived Products (Egg) Nausea and Vomiting and GI Disturbance    Adhesive Tape Other (See Comments)     Blistered skin     Family History   Problem Relation Age of Onset    No Known Problems Mother     Heart Disease Father         MIs    No Known Problems Sister     No Known Problems Sister     No Known Problems Brother     No Known Problems Brother     No Known Problems Brother     Alzheimer Disease Maternal Grandmother     Heart Disease Maternal Grandfather         MI or stroke?    Heart Disease Paternal Grandmother      Social History     Socioeconomic History    Marital status: Single     Spouse name: None    Number of children: None    Years of education: None    Highest education level: None   Tobacco Use    Smoking status: Former     Types: Vaping Device     Passive exposure: Past    Smokeless tobacco: Never    Tobacco comments:     12/23/20: 1-2 per day   Vaping Use    Vaping status: Former    Substances: Nicotine, Flavoring    Devices: Pre-filled pod   Substance and Sexual Activity    Alcohol use: Not Currently    Drug use: Not Currently     Types: Methamphetamines     Comment: October 2019: over 2 years clean    Sexual activity: Yes     Partners: Male   Social History Narrative    Originally from [place]    Moved to this area in 10/2020 with boyfriend     Social Drivers of Health     Financial Resource Strain: Low Risk  (12/11/2023)    Financial Resource Strain     Within the past 12 months, have you or your family members you live with been unable to get  utilities (heat, electricity) when it was really needed?: No   Food Insecurity: Low Risk  (4/17/2024)    Food Insecurity     Within the past 12 months, did you worry that your food would run out before you got money to buy more?: No     Within the past 12 months, did the food you bought just not last and you didn t have money to get more?: No   Transportation Needs: Low Risk  (12/11/2023)    Transportation Needs     Within the past 12 months, has lack of transportation kept you from medical appointments, getting your medicines, non-medical meetings or appointments, work, or from getting things that you need?: No   Interpersonal Safety: Low Risk  (11/7/2024)    Interpersonal Safety     Do you feel physically and emotionally safe where you currently live?: Yes     Within the past 12 months, have you been hit, slapped, kicked or otherwise physically hurt by someone?: No     Within the past 12 months, have you been humiliated or emotionally abused in other ways by your partner or ex-partner?: No   Housing Stability: Low Risk  (12/11/2023)    Housing Stability     Do you have housing? : Yes     Are you worried about losing your housing?: No             Review of systems.  CNS: There are occasional headaches, no blurred vision, no change in mental status,   ENT: There is no hearing loss.  EYES:  No visual complaints.  Respiratory: There is no dyspnea, cough or hemoptysis.  Cardiac: There is no chest pain, orthopnea, PND, or ankle edema.  GI: There is no bright red blood per rectum, no hematemesis, no reflux, no diarrhea or constipation, there is intermittent nausea/vomiting  Musculoskeletal: There are no arthralgias, joint swelling or extremity pain  : There is no urinary frequency, hematuria.  Constitutional: There is no fevers, night sweats, weight loss.  Endocrine: There is mild fatigue  Neuro: There is no tingling or numbness in the hands or feet.  Hematologic: There is no gingival bleeding, epistaxis, or easy  bruisability.  Dermatologic: There is no skin rash.  A 14 point review of systems is otherwise negative.            Physical Exam    /76 (BP Location: Right arm, Patient Position: Sitting, Cuff Size: Adult Regular)   Pulse 84   Temp 97.7  F (36.5  C) (Tympanic)   Resp 12   Wt 54.3 kg (119 lb 12.8 oz)   LMP 11/28/2024 (Approximate)   SpO2 99%   BMI 21.91 kg/m        GENERAL: Alert and oriented to time place and person. Seated comfortably. In no distress.    HEAD: Atraumatic and normocephalic.    EYES: ZURI, EOMI.  No pallor.  No icterus.    Oral cavity: no mucosal lesion or tonsillar enlargement.    NECK: supple. JVP normal.  No thyroid enlargement.  There is shotty left cervical node palpable less than 1 cm    LYMPH NODES: No palpable, cervical, axillary or inguinal lymphadenopathy.  There is a shotty left cervical node palpable less than 1 cm    LUNGS: clear to auscultation bilaterally.      HEART: S1 and S2 are heard. Regular rate and rhythm.  No murmur or gallop or rub heard.  No peripheral edema.    ABDOMEN: Soft. Not tender. Not distended.  No palpable hepatomegaly or splenomegaly.  No other mass palpable.  Normal active bowel sounds heard.    EXTREMITIES: No ankle edema.    SKIN: no rash, or bruising or purpura.  Neuro: Grossly nonfocal    Lab Results  Component      Latest Ref Rn 11/7/2024  4:28 PM 12/10/2024  11:28 AM   WBC      4.0 - 11.0 10e3/uL 6.5  6.0    RBC Count      3.80 - 5.20 10e6/uL 4.98  4.63    Hemoglobin      11.7 - 15.7 g/dL 14.3  13.4    Hematocrit      35.0 - 47.0 % 43.7  40.0    MCV      78 - 100 fL 88  86    MCH      26.5 - 33.0 pg 28.7  28.9    MCHC      31.5 - 36.5 g/dL 32.7  33.5    RDW      10.0 - 15.0 % 12.1  12.1    Platelet Count      150 - 450 10e3/uL 257  280    % Neutrophils      % 44  62    % Lymphocytes      % 32  28    % Monocytes      % 6  7    % Eosinophils      % 17  3    % Basophils      % 0  1    % Immature Granulocytes      % 0  0    NRBCs per 100 WBC       <1 /100 0  0    Absolute Neutrophils      1.6 - 8.3 10e3/uL 2.9  3.7    Absolute Lymphocytes      0.8 - 5.3 10e3/uL 2.1  1.7    Absolute Monocytes      0.0 - 1.3 10e3/uL 0.4  0.4    Absolute Eosinophils      0.0 - 0.7 10e3/uL 1.1 (H)  0.2    Absolute Basophils      0.0 - 0.2 10e3/uL 0.0  0.0    Absolute Immature Granulocytes      <=0.4 10e3/uL 0.0  0.0    Absolute NRBCs      10e3/uL 0.0  0.0    Sodium      135 - 145 mmol/L 140     Potassium      3.4 - 5.3 mmol/L 4.7     Carbon Dioxide (CO2)      22 - 29 mmol/L 27     Anion Gap      7 - 15 mmol/L 9     Urea Nitrogen      6.0 - 20.0 mg/dL 11.3     Creatinine      0.51 - 0.95 mg/dL 0.71     GFR Estimate      >60 mL/min/1.73m2 >90     Calcium      8.8 - 10.4 mg/dL 10.0     Chloride      98 - 107 mmol/L 104     Glucose      70 - 99 mg/dL 88     Alkaline Phosphatase      40 - 150 U/L 66     AST      0 - 45 U/L 19     ALT      0 - 50 U/L 20     Protein Total      6.4 - 8.3 g/dL 7.4     Albumin      3.5 - 5.2 g/dL 4.7     Bilirubin Total      <=1.2 mg/dL 0.5     Patient Fasting? No     % Retic      0.5 - 2.0 %  1.2    Absolute Retic      0.025 - 0.095 10e6/uL  0.053    Vitamin D, Total (25-Hydroxy)      20 - 50 ng/mL 29     TSH      0.30 - 4.20 uIU/mL 0.98     Sed Rate      0 - 20 mm/hr 1     CRP Inflammation      <5.00 mg/L <3.00        Legend:  (H) High      Imaging Results    No results found.    Assessment/Plan:    1: Stage IIa nodular sclerosing Hodgkin's lymphoma presenting with right supra clavicular lymphadenopathy, bilateral cervical adenopathy as well as right paratracheal adenopathy diagnosed on May 28, 2013 via right supraclavicular node biopsy while living in [place], [place] status post 4 cycles of ABVD with last 2 cycles having bleomycin omitted due to concerns for bleomycin toxicity with essentially complete response except for right inguinal adenopathy that was biopsied and came back negative.  Serial scans have been negative.  Her last CT neck in 2021  was negative.  Now with symptoms of nausea vomiting, fatigue, left cervical adenopathy.  We would like to restage the patient with a PET scan to confirm remission.  Given the fact she has been treated with Adriamycin in the past we will also like to obtain an echocardiogram to assess left ventricular ejection fraction.  Otherwise we will see the patient after the above workup.  2.  :.  Eosinophilia: Resolved suspect secondary to egg allergy we will obtain a peripheral blood smear nonetheless.  Time spent: 118 minutes was spent on this new patient consultation visit: We spent time reviewing multiple records from [facility], [facility], reviewed oncology notes from [facility], reviewed other physician provider notes including PA and nurse practitioner notes, discussed symptoms and lab results with the patient, performing history/physical, we documented a history/physical, and ordered PET scan and echocardiogram as well as repeat labs and appointments.  The longitudinal plan of care for the diagnosis(es)/condition(s) as documented were addressed during this visit. Due to the added complexity in care, I will continue to support Noemí in the subsequent management and with ongoing continuity of care.    Signed by: Nick Ham MD

## 2024-12-12 LAB — PATH REPORT.FINAL DX SPEC: NORMAL

## 2025-01-08 ENCOUNTER — HOSPITAL ENCOUNTER (OUTPATIENT)
Dept: CARDIOLOGY | Facility: OTHER | Age: 35
Discharge: HOME | End: 2025-01-08
Attending: INTERNAL MEDICINE
Payer: COMMERCIAL

## 2025-01-08 DIAGNOSIS — Z92.21 STATUS POST ADMINISTRATION OF CARDIOTOXIC CHEMOTHERAPY: ICD-10-CM

## 2025-01-08 LAB — LVEF ECHO: NORMAL

## 2025-01-08 PROCEDURE — 93356 MYOCRD STRAIN IMG SPCKL TRCK: CPT

## 2025-01-22 ENCOUNTER — HOSPITAL ENCOUNTER (OUTPATIENT)
Dept: PET IMAGING | Facility: OTHER | Age: 35
Discharge: HOME | End: 2025-01-22
Attending: INTERNAL MEDICINE
Payer: COMMERCIAL

## 2025-01-22 DIAGNOSIS — C81.18 NODULAR SCLEROSIS HODGKIN LYMPHOMA OF LYMPH NODES OF MULTIPLE REGIONS (H): ICD-10-CM

## 2025-01-22 PROCEDURE — 78815 PET IMAGE W/CT SKULL-THIGH: CPT | Mod: PS

## 2025-01-22 PROCEDURE — 343N000001 HC RX 343 MED OP 636: Performed by: INTERNAL MEDICINE

## 2025-01-22 PROCEDURE — A9552 F18 FDG: HCPCS | Performed by: INTERNAL MEDICINE

## 2025-01-22 RX ORDER — FLUDEOXYGLUCOSE F 18 200 MCI/ML
12.64 INJECTION, SOLUTION INTRAVENOUS ONCE
Status: COMPLETED | OUTPATIENT
Start: 2025-01-22 | End: 2025-01-22

## 2025-01-22 RX ADMIN — FLUDEOXYGLUCOSE F 18 12.64 MILLICURIE: 200 INJECTION, SOLUTION INTRAVENOUS at 16:09

## 2025-02-10 ENCOUNTER — TELEPHONE (OUTPATIENT)
Dept: ONCOLOGY | Facility: OTHER | Age: 35
End: 2025-02-10
Payer: COMMERCIAL

## 2025-02-10 NOTE — TELEPHONE ENCOUNTER
----- Message from Lavern BYERS sent at 2/10/2025  1:55 PM CST -----  Regarding: RE: cancelled follow up  Patient stated she does not need to be seen. Everything looks good on her scans and doesn't need an appointment for her to be told that everything is good. She stated that she will call if she feels like she needs to see a provider.  Lavern Gonzalez on 2/10/2025 at 1:56 PM  ----- Message -----  From: Ruth Vega CMA  Sent: 2/10/2025   8:23 AM CST  To:  Oncology/Infusion ;  Onc Nurse  Subject: cancelled follow up                              This lady saw Dr Ham on 12/10 and was set up for a PET scan which was done on 1/29. Her follow up appointments have been changed due to ACP's leave, but the patient cancelled her appointment with Dr Roque today on [facility].   We should call her and reschedule this, right? Can she see an NP for this?    Ruth

## 2025-02-24 NOTE — TELEPHONE ENCOUNTER
Per Nick Ham MD  WE should reschedule to discuss PET scan and obtain CBC,CMP,LDH   Message sent to scheduling to reach out to patient   Ely Asher RN...........2/24/2025 8:41 AM

## 2025-02-25 ENCOUNTER — TELEPHONE (OUTPATIENT)
Dept: ONCOLOGY | Facility: OTHER | Age: 35
End: 2025-02-25
Payer: COMMERCIAL

## 2025-02-25 NOTE — TELEPHONE ENCOUNTER
Patient declines to schedule with oncology. Stated to  everything is good.  Ely Asher RN...........2/25/2025 10:40 AM

## 2025-03-06 ENCOUNTER — HOSPITAL ENCOUNTER (EMERGENCY)
Facility: OTHER | Age: 35
Discharge: HOME | End: 2025-03-06
Attending: FAMILY MEDICINE
Payer: COMMERCIAL

## 2025-03-06 ENCOUNTER — APPOINTMENT (OUTPATIENT)
Dept: MRI IMAGING | Facility: OTHER | Age: 35
End: 2025-03-06
Attending: FAMILY MEDICINE
Payer: COMMERCIAL

## 2025-03-06 VITALS
WEIGHT: 120 LBS | BODY MASS INDEX: 22.08 KG/M2 | HEART RATE: 62 BPM | TEMPERATURE: 98.5 F | OXYGEN SATURATION: 99 % | SYSTOLIC BLOOD PRESSURE: 98 MMHG | HEIGHT: 62 IN | RESPIRATION RATE: 8 BRPM | DIASTOLIC BLOOD PRESSURE: 68 MMHG

## 2025-03-06 DIAGNOSIS — R42 VERTIGO: ICD-10-CM

## 2025-03-06 LAB
ALBUMIN SERPL BCG-MCNC: 4.6 G/DL (ref 3.5–5.2)
ALBUMIN UR-MCNC: NEGATIVE MG/DL
ALP SERPL-CCNC: 60 U/L (ref 40–150)
ALT SERPL W P-5'-P-CCNC: 17 U/L (ref 0–50)
ANION GAP SERPL CALCULATED.3IONS-SCNC: 13 MMOL/L (ref 7–15)
APPEARANCE UR: CLEAR
AST SERPL W P-5'-P-CCNC: 21 U/L (ref 0–45)
BACTERIA #/AREA URNS HPF: ABNORMAL /HPF
BASOPHILS # BLD AUTO: 0 10E3/UL (ref 0–0.2)
BASOPHILS NFR BLD AUTO: 0 %
BILIRUB SERPL-MCNC: 0.6 MG/DL
BILIRUB UR QL STRIP: NEGATIVE
BUN SERPL-MCNC: 14.9 MG/DL (ref 6–20)
CALCIUM SERPL-MCNC: 10.1 MG/DL (ref 8.8–10.4)
CHLORIDE SERPL-SCNC: 105 MMOL/L (ref 98–107)
COLOR UR AUTO: YELLOW
CREAT SERPL-MCNC: 0.79 MG/DL (ref 0.51–0.95)
EGFRCR SERPLBLD CKD-EPI 2021: >90 ML/MIN/1.73M2
EOSINOPHIL # BLD AUTO: 0.1 10E3/UL (ref 0–0.7)
EOSINOPHIL NFR BLD AUTO: 3 %
ERYTHROCYTE [DISTWIDTH] IN BLOOD BY AUTOMATED COUNT: 12 % (ref 10–15)
GLUCOSE BLDC GLUCOMTR-MCNC: 86 MG/DL (ref 70–99)
GLUCOSE SERPL-MCNC: 90 MG/DL (ref 70–99)
GLUCOSE UR STRIP-MCNC: NEGATIVE MG/DL
HCG UR QL: NEGATIVE
HCO3 SERPL-SCNC: 22 MMOL/L (ref 22–29)
HCT VFR BLD AUTO: 44 % (ref 35–47)
HGB BLD-MCNC: 15.1 G/DL (ref 11.7–15.7)
HGB UR QL STRIP: NEGATIVE
HOLD SPECIMEN: NORMAL
IMM GRANULOCYTES # BLD: 0 10E3/UL
IMM GRANULOCYTES NFR BLD: 0 %
KETONES UR STRIP-MCNC: NEGATIVE MG/DL
LEUKOCYTE ESTERASE UR QL STRIP: NEGATIVE
LYMPHOCYTES # BLD AUTO: 1.6 10E3/UL (ref 0.8–5.3)
LYMPHOCYTES NFR BLD AUTO: 31 %
MAGNESIUM SERPL-MCNC: 2.3 MG/DL (ref 1.7–2.3)
MCH RBC QN AUTO: 28.8 PG (ref 26.5–33)
MCHC RBC AUTO-ENTMCNC: 34.3 G/DL (ref 31.5–36.5)
MCV RBC AUTO: 84 FL (ref 78–100)
MONOCYTES # BLD AUTO: 0.4 10E3/UL (ref 0–1.3)
MONOCYTES NFR BLD AUTO: 7 %
NEUTROPHILS # BLD AUTO: 3 10E3/UL (ref 1.6–8.3)
NEUTROPHILS NFR BLD AUTO: 58 %
NITRATE UR QL: NEGATIVE
NRBC # BLD AUTO: 0 10E3/UL
NRBC BLD AUTO-RTO: 0 /100
PH UR STRIP: 5.5 [PH] (ref 5–9)
PLATELET # BLD AUTO: 246 10E3/UL (ref 150–450)
POTASSIUM SERPL-SCNC: 3.9 MMOL/L (ref 3.4–5.3)
PROT SERPL-MCNC: 7.3 G/DL (ref 6.4–8.3)
RBC # BLD AUTO: 5.24 10E6/UL (ref 3.8–5.2)
RBC URINE: <1 /HPF
SODIUM SERPL-SCNC: 140 MMOL/L (ref 135–145)
SP GR UR STRIP: 1 (ref 1–1.03)
SQUAMOUS EPITHELIAL: 5 /HPF
TSH SERPL DL<=0.005 MIU/L-ACNC: 0.59 UIU/ML (ref 0.3–4.2)
UROBILINOGEN UR STRIP-MCNC: NORMAL MG/DL
WBC # BLD AUTO: 5.1 10E3/UL (ref 4–11)
WBC URINE: 1 /HPF

## 2025-03-06 PROCEDURE — 258N000003 HC RX IP 258 OP 636: Performed by: FAMILY MEDICINE

## 2025-03-06 PROCEDURE — 99285 EMERGENCY DEPT VISIT HI MDM: CPT | Mod: 25 | Performed by: FAMILY MEDICINE

## 2025-03-06 PROCEDURE — 96374 THER/PROPH/DIAG INJ IV PUSH: CPT | Performed by: FAMILY MEDICINE

## 2025-03-06 PROCEDURE — 85025 COMPLETE CBC W/AUTO DIFF WBC: CPT | Performed by: FAMILY MEDICINE

## 2025-03-06 PROCEDURE — 81001 URINALYSIS AUTO W/SCOPE: CPT | Performed by: FAMILY MEDICINE

## 2025-03-06 PROCEDURE — 93005 ELECTROCARDIOGRAM TRACING: CPT | Performed by: FAMILY MEDICINE

## 2025-03-06 PROCEDURE — 82962 GLUCOSE BLOOD TEST: CPT

## 2025-03-06 PROCEDURE — 96361 HYDRATE IV INFUSION ADD-ON: CPT | Performed by: FAMILY MEDICINE

## 2025-03-06 PROCEDURE — 99284 EMERGENCY DEPT VISIT MOD MDM: CPT | Performed by: FAMILY MEDICINE

## 2025-03-06 PROCEDURE — 83735 ASSAY OF MAGNESIUM: CPT | Performed by: FAMILY MEDICINE

## 2025-03-06 PROCEDURE — 84450 TRANSFERASE (AST) (SGOT): CPT | Performed by: FAMILY MEDICINE

## 2025-03-06 PROCEDURE — 36415 COLL VENOUS BLD VENIPUNCTURE: CPT | Performed by: FAMILY MEDICINE

## 2025-03-06 PROCEDURE — 84443 ASSAY THYROID STIM HORMONE: CPT | Performed by: FAMILY MEDICINE

## 2025-03-06 PROCEDURE — 81025 URINE PREGNANCY TEST: CPT | Performed by: FAMILY MEDICINE

## 2025-03-06 PROCEDURE — 82435 ASSAY OF BLOOD CHLORIDE: CPT | Performed by: FAMILY MEDICINE

## 2025-03-06 PROCEDURE — 80053 COMPREHEN METABOLIC PANEL: CPT | Performed by: FAMILY MEDICINE

## 2025-03-06 PROCEDURE — 70551 MRI BRAIN STEM W/O DYE: CPT

## 2025-03-06 PROCEDURE — 93010 ELECTROCARDIOGRAM REPORT: CPT | Performed by: INTERNAL MEDICINE

## 2025-03-06 PROCEDURE — 250N000011 HC RX IP 250 OP 636: Performed by: FAMILY MEDICINE

## 2025-03-06 RX ORDER — METOCLOPRAMIDE HYDROCHLORIDE 5 MG/ML
10 INJECTION INTRAMUSCULAR; INTRAVENOUS ONCE
Status: COMPLETED | OUTPATIENT
Start: 2025-03-06 | End: 2025-03-06

## 2025-03-06 RX ADMIN — METOCLOPRAMIDE 10 MG: 5 INJECTION, SOLUTION INTRAMUSCULAR; INTRAVENOUS at 10:35

## 2025-03-06 RX ADMIN — SODIUM CHLORIDE 1000 ML: 0.9 INJECTION, SOLUTION INTRAVENOUS at 10:35

## 2025-03-06 ASSESSMENT — COLUMBIA-SUICIDE SEVERITY RATING SCALE - C-SSRS
2. HAVE YOU ACTUALLY HAD ANY THOUGHTS OF KILLING YOURSELF IN THE PAST MONTH?: NO
6. HAVE YOU EVER DONE ANYTHING, STARTED TO DO ANYTHING, OR PREPARED TO DO ANYTHING TO END YOUR LIFE?: NO
1. IN THE PAST MONTH, HAVE YOU WISHED YOU WERE DEAD OR WISHED YOU COULD GO TO SLEEP AND NOT WAKE UP?: NO

## 2025-03-06 ASSESSMENT — ACTIVITIES OF DAILY LIVING (ADL)
ADLS_ACUITY_SCORE: 50

## 2025-03-06 NOTE — DISCHARGE INSTRUCTIONS
Improving Vertigo, common causes include benign positional vertigo, labyrinthitis, migraine.  Much less common causes include stroke or malignancy.  Those less common but serious causes have been essentially ruled out with your reassuring MRI today.  Recommend conservative treatment of vertigo going forward and follow-up with your primary care physician.  Return to the emergency department with worsening symptoms such as severe headache, strokelike symptoms or fever.

## 2025-03-06 NOTE — ED PROVIDER NOTES
History     Chief Complaint   Patient presents with    Dizziness    Generalized Weakness     HPI  Noemí Garcia is a 34 year old female who ***  Pt is c/o of dizziness that started yesterday morning after working night shift. Today the dizziness is worse. She is having a difficult walking and states that her legs feel weak. During triage, pt was listing to right. She was having a difficult time staying upright in the chair. Pt brought back to [facility], provider at bedside.   Allergies:  Allergies   Allergen Reactions    Chicken-Derived Products (Egg) Nausea and Vomiting and GI Disturbance    Adhesive Tape Other (See Comments)     Blistered skin       Problem List:    Patient Active Problem List    Diagnosis Date Noted    Abnormal MRI, cervical spine 02/02/2024     Priority: Medium    Submandibular abscess 11/20/2021     Priority: Medium    Postpartum endometritis 07/30/2021     Priority: Medium    History of lymphoma 07/28/2021     Priority: Medium    ASCUS with positive high risk HPV cervical 07/28/2021     Priority: Medium    Hodgkin's disease of lymph nodes of multiple sites (H) 11/12/2019     Priority: Medium    Bipolar II disorder, moderate, depressed, with anxious distress (H) 10/05/2018     Priority: Medium    Drug overdose 06/14/2017     Priority: Medium    Overdose of antidepressant 06/14/2017     Priority: Medium    Biliary colic 04/25/2017     Priority: Medium    Umbilical vein abnormality affecting pregnancy 12/15/2016     Priority: Medium     Umbilical vein varix    Recommend induction at 37 weeks by perinatology      RUQ pain 12/03/2016     Priority: Medium    Deviated nasal septum 03/10/2016     Priority: Medium    Generalized anxiety disorder 03/02/2016     Priority: Medium    Kidney stones 06/16/2015     Priority: Medium    Renal colic 05/23/2015     Priority: Medium    Anal fissure 07/10/2013     Priority: Medium        Past Medical History:    Past Medical History:   Diagnosis Date    Abnormal Pap  "smear of cervix     Cancer (H)     Cerebral infarction (H)     Depressive disorder        Past Surgical History:    Past Surgical History:   Procedure Laterality Date    AS REMOVE GROIN LYMPH NODES      BONE MARROW BIOPSY      COLONOSCOPY      COLPOSCOPY      COMBINED CYSTOSCOPY, URETEROSCOPY, LASER HOLMIUM LITHOTRIPSY URETER(S) Right 3/15/2022    Procedure: Right ureteroscopy with laser lithotripsy an stent placement;  Surgeon: Justo Jimenez MD;  Location: [facility] OR    EXTRACORPOREAL SHOCK WAVE LITHOTRIPSY, CYSTOSCOPY, INSERT STENT URETER(S), COMBINED      GALLBLADDER SURGERY      HEAD & NECK SURGERY      cyst removal    KIDNEY STONE SURGERY      RHINOPLASTY         Family History:    Family History   Problem Relation Age of Onset    No Known Problems Mother     Heart Disease Father         MIs    No Known Problems Sister     No Known Problems Sister     No Known Problems Brother     No Known Problems Brother     No Known Problems Brother     Alzheimer Disease Maternal Grandmother     Heart Disease Maternal Grandfather         MI or stroke?    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        Medications:    ondansetron (ZOFRAN ODT) 4 MG ODT tab  Vitamin D3 (VITAMIN D, CHOLECALCIFEROL,) 25 mcg (1000 units) tablet          Review of Systems    Physical Exam   BP: (!) 137/99  Pulse: 76  Temp: 98.5  F (36.9  C)  Resp: 16  Height: 157.5 cm (5' 2\")  Weight: 54.4 kg (120 lb)  SpO2: 100 %      Physical Exam    ED Course        Procedures    EKG normal " sinus rhythm, normal ECG, rate 70, QTc 401 ms    Results for orders placed or performed during the hospital encounter of 03/06/25 (from the past 24 hours)   Glucose by meter   Result Value Ref Range    GLUCOSE BY METER POCT 86 70 - 99 mg/dL   [place] Draw    Narrative    The following orders were created for panel order [place] Draw.  Procedure                               Abnormality         Status                     ---------                               -----------         ------                     Extra Blue Top Tube[[id number]]                              Final result               Extra Red Top Tube[[id number]]                               Final result               Extra Green Top (Lithium...[[id number]]                      Final result               Extra Purple Top Tube[[id number]]                            Final result               Extra Green Top (Lithium...[[id number]]                      Final result                 Please view results for these tests on the individual orders.   CBC with platelets differential    Narrative    The following orders were created for panel order CBC with platelets differential.  Procedure                               Abnormality         Status                     ---------                               -----------         ------                     CBC with platelets and d...[[id number]]  Abnormal            Final result                 Please view results for these tests on the individual orders.   Comprehensive metabolic panel   Result Value Ref Range    Sodium 140 135 - 145 mmol/L    Potassium 3.9 3.4 - 5.3 mmol/L    Carbon Dioxide (CO2) 22 22 - 29 mmol/L    Anion Gap 13 7 - 15 mmol/L    Urea Nitrogen 14.9 6.0 - 20.0 mg/dL    Creatinine 0.79 0.51 - 0.95 mg/dL    GFR Estimate >90 >60 mL/min/1.73m2    Calcium 10.1 8.8 - 10.4 mg/dL    Chloride 105 98 - 107 mmol/L    Glucose 90 70 - 99 mg/dL    Alkaline Phosphatase 60 40 - 150 U/L    AST 21 0 - 45 U/L    ALT 17 0 - 50  U/L    Protein Total 7.3 6.4 - 8.3 g/dL    Albumin 4.6 3.5 - 5.2 g/dL    Bilirubin Total 0.6 <=1.2 mg/dL   Magnesium   Result Value Ref Range    Magnesium 2.3 1.7 - 2.3 mg/dL   TSH Reflex [facility]   Result Value Ref Range    TSH 0.59 0.30 - 4.20 uIU/mL   Extra Blue Top Tube   Result Value Ref Range    Hold Specimen jic    Extra Red Top Tube   Result Value Ref Range    Hold Specimen JIC    Extra Green Top (Lithium Heparin) Tube   Result Value Ref Range    Hold Specimen JIC    Extra Purple Top Tube   Result Value Ref Range    Hold Specimen jic    Extra Green Top (Lithium Heparin) ON ICE   Result Value Ref Range    Hold Specimen JIC    CBC with platelets and differential   Result Value Ref Range    WBC Count 5.1 4.0 - 11.0 10e3/uL    RBC Count 5.24 (H) 3.80 - 5.20 10e6/uL    Hemoglobin 15.1 11.7 - 15.7 g/dL    Hematocrit 44.0 35.0 - 47.0 %    MCV 84 78 - 100 fL    MCH 28.8 26.5 - 33.0 pg    MCHC 34.3 31.5 - 36.5 g/dL    RDW 12.0 10.0 - 15.0 %    Platelet Count 246 150 - 450 10e3/uL    % Neutrophils 58 %    % Lymphocytes 31 %    % Monocytes 7 %    % Eosinophils 3 %    % Basophils 0 %    % Immature Granulocytes 0 %    NRBCs per 100 WBC 0 <1 /100    Absolute Neutrophils 3.0 1.6 - 8.3 10e3/uL    Absolute Lymphocytes 1.6 0.8 - 5.3 10e3/uL    Absolute Monocytes 0.4 0.0 - 1.3 10e3/uL    Absolute Eosinophils 0.1 0.0 - 0.7 10e3/uL    Absolute Basophils 0.0 0.0 - 0.2 10e3/uL    Absolute Immature Granulocytes 0.0 <=0.4 10e3/uL    Absolute NRBCs 0.0 10e3/uL   HCG qualitative urine (UPT)   Result Value Ref Range    hCG Urine Qualitative Negative Negative   UA with Microscopic reflex to Culture    Specimen: Urine, Midstream   Result Value Ref Range    Color Urine Yellow Colorless, Straw, Light Yellow, Yellow    Appearance Urine Clear Clear    Glucose Urine Negative Negative mg/dL    Bilirubin Urine Negative Negative    Ketones Urine Negative Negative mg/dL    Specific Gravity Urine 1.004 1.000 - 1.030    Blood Urine Negative Negative     "pH Urine 5.5 5.0 - 9.0    Protein Albumin Urine Negative Negative mg/dL    Urobilinogen Urine Normal Normal, 2.0 mg/dL    Nitrite Urine Negative Negative    Leukocyte Esterase Urine Negative Negative    Bacteria Urine Few (A) None Seen /HPF    RBC Urine <1 <=2 /HPF    WBC Urine 1 <=5 /HPF    Squamous Epithelials Urine 5 (H) <=1 /HPF    Narrative    Urine Culture not indicated   MR Brain w/o Contrast    Narrative    EXAM:  MR BRAIN W/O CONTRAST    HISTORY:  vertigo, ro mass infarct. .    TECHNIQUE:  Sagittal T1, axial T1, T2, FLAIR, gradient and diffusion  noncontrast imaging of the whole brain was performed.    COMPARISON:  None.     FINDINGS:    The ventricles and sulci are normal. No abnormal extra-axial  collection, hydrocephalus, mass effect or midline shift is seen. The  basal cisterns are preserved.    Slightly asymmetric gradient signal is seen in the right globus  pallidus, likely a normal variant of basal ganglia calcification. No  restricted diffusion is present. The major intracranial vascular flow  voids are preserved.    The T1 marrow signal is unremarkable. A partial left mastoid effusion  is noted.      Impression    IMPRESSION: No evidence of acute ischemia, mass effect, edema or  hydrocephalus.    ADRIANA OJEDA MD         SYSTEM ID:  [id number]       Medications   sodium chloride 0.9% BOLUS 1,000 mL (0 mLs Intravenous Stopped 3/6/25 1135)   metoclopramide (REGLAN) injection 10 mg (10 mg Intravenous $Given 3/6/25 1035)       Assessments & Plan (with Medical Decision Making)     I have reviewed the nursing notes.    I have reviewed the findings, diagnosis, plan and need for follow up with the patient.  {ED Addendum:[id number]::\" \"}        Medical Decision Making  The patient's presentation was of {[facility] Problem:512564}.    The patient's evaluation involved:  {[facility] Data:561629}    The patient's management necessitated {[facility] Management:195203}.    Improving Vertigo, common causes include benign positional " vertigo, labyrinthitis, migraine.  Much less common causes include stroke or malignancy.  Those less common but serious causes have been essentially ruled out with your reassuring MRI today.  Recommend conservative treatment of vertigo going forward and follow-up with your primary care physician.  Return to the emergency department with worsening symptoms such as severe headache, strokelike symptoms or fever.    New Prescriptions    No medications on file       Final diagnoses:   Vertigo       3/6/2025   [facility] hospitals            Orders Placed This Encounter   Procedures    MR Brain w/o Contrast    [place] Draw    Glucose by meter    Comprehensive metabolic panel    Magnesium    TSH Reflex [facility]    HCG qualitative urine (UPT)    UA with Microscopic reflex to Culture    Extra Blue Top Tube    Extra Red Top Tube    Extra Green Top (Lithium Heparin) Tube    Extra Purple Top Tube    Extra Green Top (Lithium Heparin) ON ICE    CBC with platelets and differential    EKG 12 lead    CBC with platelets differential      Due to subacute nature of symptoms, as well as patient's strabismus and amblyopia at baseline I discussed with Dr. Culp about going straight to MRI.  He agreed      Medical Decision Making  The patient's presentation was of moderate complexity (an undiagnosed new problem with uncertain prognosis).    The patient's evaluation involved:  review of 2 test result(s) ordered prior to this encounter (see separate area of note for details)  discussion of management or test interpretation with another health professional (see separate area of note for details)    The patient's management necessitated moderate risk (prescription drug management including medications given in the ED).    Reassuring workup in ED, reassuring clinical trajectory.  After shared decision-making conversation patient agreed that no further workup was definitively indicated that outpatient management was appropriate    Improving Vertigo, common causes include benign positional vertigo, labyrinthitis, migraine.  Much less common causes include stroke or malignancy.  Those less common but serious causes have been essentially ruled out with your reassuring MRI today.  Recommend conservative treatment of vertigo going forward and follow-up with your primary care physician.  Return to the emergency department with worsening symptoms such as severe headache, strokelike symptoms or fever.  Patient verbalized understanding plan is agreement left ED and much improving  condition.    New Prescriptions    No medications on file       Final diagnoses:   Vertigo       3/6/2025   [facility]       Michael Mccabe MD  03/11/25 0502

## 2025-03-06 NOTE — ED TRIAGE NOTES
"Pt is c/o of dizziness that started yesterday morning after working night shift.  Today the dizziness is worse.   She is having a difficult walking and states that her legs feel weak.   During triage, pt was listing to right.     She was having a difficult time staying upright in the chair.   Pt brought back to [facility], provider at bedside.  BP (!) 137/99   Pulse 76   Temp 98.5  F (36.9  C) (Temporal)   Resp 16   Ht 1.575 m (5' 2\")   Wt 54.4 kg (120 lb)   SpO2 100%   BMI 21.95 kg/m    Arianna Pedraza RN on 3/6/2025 at 10:22 AM      BS 86   Triage Assessment (Adult)       Row Name 03/06/25 1019 03/06/25 1013       Triage Assessment    Airway WDL WDL WDL       Respiratory WDL    Respiratory WDL WDL WDL       Skin Circulation/Temperature WDL    Skin Circulation/Temperature WDL WDL WDL       Cardiac WDL    Cardiac WDL WDL WDL       Peripheral/Neurovascular WDL    Peripheral Neurovascular WDL WDL WDL       Cognitive/Neuro/Behavioral WDL    Cognitive/Neuro/Behavioral WDL WDL WDL       [place] Coma Scale    Best Eye Response 4-->(E4) spontaneous 4-->(E4) spontaneous    Best Motor Response 6-->(M6) obeys commands 6-->(M6) obeys commands    Best Verbal Response 5-->(V5) oriented 5-->(V5) oriented    [place] Coma Scale Score 15 15                    "

## 2025-03-07 LAB
ATRIAL RATE - MUSE: 70 BPM
DIASTOLIC BLOOD PRESSURE - MUSE: NORMAL MMHG
INTERPRETATION ECG - MUSE: NORMAL
P AXIS - MUSE: 53 DEGREES
PR INTERVAL - MUSE: 114 MS
QRS DURATION - MUSE: 80 MS
QT - MUSE: 372 MS
QTC - MUSE: 401 MS
R AXIS - MUSE: 64 DEGREES
SYSTOLIC BLOOD PRESSURE - MUSE: NORMAL MMHG
T AXIS - MUSE: 49 DEGREES
VENTRICULAR RATE- MUSE: 70 BPM

## 2025-03-11 ASSESSMENT — ENCOUNTER SYMPTOMS
FEVER: 0
DIZZINESS: 1
CHILLS: 0
HEADACHES: 0

## 2025-03-11 ASSESSMENT — VISUAL ACUITY: OU: 1

## 2025-03-13 ENCOUNTER — VIRTUAL VISIT (OUTPATIENT)
Dept: FAMILY MEDICINE | Facility: OTHER | Age: 35
End: 2025-03-13
Attending: PHYSICIAN ASSISTANT
Payer: COMMERCIAL

## 2025-03-13 DIAGNOSIS — R42 VERTIGO: Primary | ICD-10-CM

## 2025-03-13 NOTE — NURSING NOTE
"Chief Complaint   Patient presents with    Follow Up     Patient was seen Thursday morning in ER. She felt better by Monday morning.  Initial There were no vitals taken for this visit. Estimated body mass index is 21.95 kg/m  as calculated from the following:    Height as of 3/6/25: 1.575 m (5' 2\").    Weight as of 3/6/25: 54.4 kg (120 lb).  Medication Review: complete    The next two questions are to help us understand your food security.  If you are feeling you need any assistance in this area, we have resources available to support you today.          4/17/2024   SDOH- Food Insecurity   Within the past 12 months, did you worry that your food would run out before you got money to buy more? N   Within the past 12 months, did the food you bought just not last and you didn t have money to get more? N         Health Care Directive:  Patient does not have a Health Care Directive: Discussed advance care planning with patient; however, patient declined at this time.    Lety Dominguez MA      "

## 2025-03-13 NOTE — PROGRESS NOTES
Noemí is a 34 year old who is being evaluated via a billable video visit.    How would you like to obtain your AVS? MyChart  If the video visit is dropped, the invitation should be resent by: Text to cell phone: [phone]  Will anyone else be joining your video visit? No      Assessment & Plan   Problem List Items Addressed This Visit    None  Visit Diagnoses       Vertigo    -  Primary           Vertigo: Patient symptoms have greatly improved.  Currently asymptomatic.  Doing well.  Can use Reglan the future as needed for symptoms.  Encouraged increased fluids with electrolytes and rest.  Encourage close follow-up if symptoms return or changing.       MED REC REQUIRED  Post Medication Reconciliation Status:  Discharge medications reconciled, continue medications without change  See Patient Instructions    No follow-ups on file.      Subjective   Noemí is a 34 year old, presenting for the following health issues:  Follow Up        3/13/2025    10:58 AM   Additional Questions   Roomed by Lety MALIK      ED/UC Followup:    Facility:  [facility]  Date of visit: 3/6/25   Reason for visit: vertigo  Current Status: better    Patient was seen in the emergency room on 3/6/2025.  Was seen for vertigo.  Started to develop symptoms the day prior.  Felt dizzy and lightheaded.  Ended up laying down.  The room started spinning.  Had symptoms even when her eyes were close previous was nauseous.  Hard time turning in bed and coordinated with walking.  Due to persistent symptoms she went to the emergency room on 3/6.  They gave her IV Reglan.  No cough, cold symptoms, fevers, chills, GI or urinary symptoms.  Keeping food and fluids down.  Symptoms slowly improved after the emergency room visit.  Thorough lab workup and brain MRI were completed which were unremarkable.  Symptoms greatly improved by 3/10/2025.  Took 1 dose of oral Reglan on 3/10/2025 which helped her symptoms.      Review of Systems  Constitutional, HEENT,  cardiovascular, pulmonary, gi and gu systems are negative, except as otherwise noted.      Objective           Vitals:  No vitals were obtained today due to virtual visit.    Physical Exam   GENERAL: alert and no distress  EYES: Eyes grossly normal to inspection.  No discharge or erythema, or obvious scleral/conjunctival abnormalities.  RESP: No audible wheeze, cough, or visible cyanosis.    SKIN: Visible skin clear. No significant rash, abnormal pigmentation or lesions.  NEURO: Cranial nerves grossly intact.  Mentation and speech appropriate for age.  PSYCH: Appropriate affect, tone, and pace of words          Video-Visit Details    Type of service:  Video Visit   Originating Location (pt. Location): Home    Distant Location (provider location):  On-site  Platform used for Video Visit: [place]  Signed Electronically by: Ritika Contreras PA-C

## 2025-05-22 NOTE — DISCHARGE INSTRUCTIONS
Get plenty of fluids and rest.  I recommend you take Tylenol, please do not take NSAIDs such as ibuprofen as these are not safe in pregnancy.  I did discuss your case with the on-call OB/GYN, she does not recommend any further antibiotics at this time.  As we discussed your symptoms seem consistent with a kidney stone without signs of obstruction most likely this will pass on its own in the coming days.  If you have intractable pain, nausea, fevers but I recommend you return for further evaluation.  Otherwise, referrals placed for you to follow-up with your OB/GYN early this next week for reassessment.  
27.6

## 2025-07-19 ENCOUNTER — HEALTH MAINTENANCE LETTER (OUTPATIENT)
Age: 35
End: 2025-07-19

## (undated) DEVICE — CATH INTERMITTENT CLEAN-CATH 16FR 16" VINYL LF 421716

## (undated) DEVICE — CATH URETERAL 5FRX70CM OPEN END FLEX TIP G14521

## (undated) DEVICE — SLEEVE COMPRESSION SCD KNEE MED 74022

## (undated) DEVICE — GLOVE PROTEXIS POWDER FREE SMT 8.5 2D72PT85X

## (undated) DEVICE — TUOGHY BORST ADAPTER WITH SIDE ARM

## (undated) DEVICE — GUIDEWIRE SENSOR DUAL FLEX STR 0.035"X150CM M0066703080

## (undated) DEVICE — SOL WATER 1500ML

## (undated) DEVICE — Device

## (undated) DEVICE — PUMP SYSTEM SINGLE ACTION M0067201000

## (undated) DEVICE — BASKET NITINOL TIPLESS HALO  1.5FRX120CM 554120

## (undated) DEVICE — PAD CHUX UNDERPAD 30X36" P3036C

## (undated) DEVICE — GUIDEWIRE AMPLATZ SUPER STIFF .038"X145CM M0066401061

## (undated) DEVICE — PACK CYSTO SBA15CSFCA

## (undated) RX ORDER — DEXAMETHASONE SODIUM PHOSPHATE 10 MG/ML
INJECTION, SOLUTION INTRAMUSCULAR; INTRAVENOUS
Status: DISPENSED
Start: 2021-11-14

## (undated) RX ORDER — SODIUM CHLORIDE 9 MG/ML
INJECTION, SOLUTION INTRAVENOUS
Status: DISPENSED
Start: 2021-07-30

## (undated) RX ORDER — ACETAMINOPHEN 500 MG
TABLET ORAL
Status: DISPENSED
Start: 2021-11-14

## (undated) RX ORDER — ONDANSETRON 2 MG/ML
INJECTION INTRAMUSCULAR; INTRAVENOUS
Status: DISPENSED
Start: 2022-03-12

## (undated) RX ORDER — DEXAMETHASONE SODIUM PHOSPHATE 4 MG/ML
INJECTION, SOLUTION INTRA-ARTICULAR; INTRALESIONAL; INTRAMUSCULAR; INTRAVENOUS; SOFT TISSUE
Status: DISPENSED
Start: 2021-11-14

## (undated) RX ORDER — LIDOCAINE HYDROCHLORIDE 10 MG/ML
INJECTION, SOLUTION EPIDURAL; INFILTRATION; INTRACAUDAL; PERINEURAL
Status: DISPENSED
Start: 2022-03-15

## (undated) RX ORDER — ACETAMINOPHEN 650 MG/20.3ML
LIQUID ORAL
Status: DISPENSED
Start: 2021-11-14

## (undated) RX ORDER — AMPICILLIN AND SULBACTAM 2; 1 G/1; G/1
INJECTION, POWDER, FOR SOLUTION INTRAMUSCULAR; INTRAVENOUS
Status: DISPENSED
Start: 2021-11-20

## (undated) RX ORDER — GENTAMICIN 40 MG/ML
INJECTION, SOLUTION INTRAMUSCULAR; INTRAVENOUS
Status: DISPENSED
Start: 2021-07-30

## (undated) RX ORDER — CLINDAMYCIN PHOSPHATE 900 MG/50ML
INJECTION, SOLUTION INTRAVENOUS
Status: DISPENSED
Start: 2021-07-30

## (undated) RX ORDER — DEXAMETHASONE SODIUM PHOSPHATE 4 MG/ML
INJECTION, SOLUTION INTRA-ARTICULAR; INTRALESIONAL; INTRAMUSCULAR; INTRAVENOUS; SOFT TISSUE
Status: DISPENSED
Start: 2021-11-15

## (undated) RX ORDER — PROPOFOL 10 MG/ML
INJECTION, EMULSION INTRAVENOUS
Status: DISPENSED
Start: 2022-03-15

## (undated) RX ORDER — ACETAMINOPHEN 650 MG/20.3ML
LIQUID ORAL
Status: DISPENSED
Start: 2021-11-15

## (undated) RX ORDER — CEFTRIAXONE SODIUM 2 G/50ML
INJECTION, SOLUTION INTRAVENOUS
Status: DISPENSED
Start: 2022-03-15

## (undated) RX ORDER — ONDANSETRON 2 MG/ML
INJECTION INTRAMUSCULAR; INTRAVENOUS
Status: DISPENSED
Start: 2021-07-30

## (undated) RX ORDER — CEFUROXIME AXETIL 250 MG/1
TABLET ORAL
Status: DISPENSED
Start: 2022-03-21

## (undated) RX ORDER — FENTANYL CITRATE 50 UG/ML
INJECTION, SOLUTION INTRAMUSCULAR; INTRAVENOUS
Status: DISPENSED
Start: 2022-03-15

## (undated) RX ORDER — SODIUM CHLORIDE 9 MG/ML
INJECTION, SOLUTION INTRAVENOUS
Status: DISPENSED
Start: 2021-11-14

## (undated) RX ORDER — CLINDAMYCIN PHOSPHATE 900 MG/50ML
INJECTION, SOLUTION INTRAVENOUS
Status: DISPENSED
Start: 2021-07-31

## (undated) RX ORDER — BETAMETHASONE SODIUM PHOSPHATE AND BETAMETHASONE ACETATE 3; 3 MG/ML; MG/ML
INJECTION, SUSPENSION INTRA-ARTICULAR; INTRALESIONAL; INTRAMUSCULAR; SOFT TISSUE
Status: DISPENSED
Start: 2021-05-19

## (undated) RX ORDER — KETOROLAC TROMETHAMINE 30 MG/ML
INJECTION, SOLUTION INTRAMUSCULAR; INTRAVENOUS
Status: DISPENSED
Start: 2021-11-20

## (undated) RX ORDER — IOPAMIDOL 755 MG/ML
INJECTION, SOLUTION INTRAVASCULAR
Status: DISPENSED
Start: 2022-03-15

## (undated) RX ORDER — HYDROMORPHONE HYDROCHLORIDE 1 MG/ML
INJECTION, SOLUTION INTRAMUSCULAR; INTRAVENOUS; SUBCUTANEOUS
Status: DISPENSED
Start: 2021-11-20

## (undated) RX ORDER — KETOROLAC TROMETHAMINE 30 MG/ML
INJECTION, SOLUTION INTRAMUSCULAR; INTRAVENOUS
Status: DISPENSED
Start: 2022-03-15

## (undated) RX ORDER — AMPICILLIN AND SULBACTAM 2; 1 G/1; G/1
INJECTION, POWDER, FOR SOLUTION INTRAMUSCULAR; INTRAVENOUS
Status: DISPENSED
Start: 2021-11-21

## (undated) RX ORDER — SODIUM CHLORIDE 9 MG/ML
INJECTION, SOLUTION INTRAVENOUS
Status: DISPENSED
Start: 2022-04-26

## (undated) RX ORDER — BETAMETHASONE SODIUM PHOSPHATE AND BETAMETHASONE ACETATE 3; 3 MG/ML; MG/ML
INJECTION, SUSPENSION INTRA-ARTICULAR; INTRALESIONAL; INTRAMUSCULAR; SOFT TISSUE
Status: DISPENSED
Start: 2021-05-20

## (undated) RX ORDER — DEXAMETHASONE SODIUM PHOSPHATE 4 MG/ML
INJECTION, SOLUTION INTRA-ARTICULAR; INTRALESIONAL; INTRAMUSCULAR; INTRAVENOUS; SOFT TISSUE
Status: DISPENSED
Start: 2022-03-15

## (undated) RX ORDER — SODIUM CHLORIDE 9 MG/ML
INJECTION, SOLUTION INTRAVENOUS
Status: DISPENSED
Start: 2022-03-12

## (undated) RX ORDER — ACETAMINOPHEN 500 MG
TABLET ORAL
Status: DISPENSED
Start: 2021-02-13

## (undated) RX ORDER — DEXAMETHASONE SODIUM PHOSPHATE 10 MG/ML
INJECTION, SOLUTION INTRAMUSCULAR; INTRAVENOUS
Status: DISPENSED
Start: 2021-11-15

## (undated) RX ORDER — LIDOCAINE HYDROCHLORIDE 20 MG/ML
INJECTION, SOLUTION EPIDURAL; INFILTRATION; INTRACAUDAL; PERINEURAL
Status: DISPENSED
Start: 2022-03-15

## (undated) RX ORDER — KETOROLAC TROMETHAMINE 30 MG/ML
INJECTION, SOLUTION INTRAMUSCULAR; INTRAVENOUS
Status: DISPENSED
Start: 2022-03-12

## (undated) RX ORDER — OXYCODONE HYDROCHLORIDE 5 MG/1
TABLET ORAL
Status: DISPENSED
Start: 2022-03-15

## (undated) RX ORDER — ONDANSETRON 4 MG/1
TABLET, ORALLY DISINTEGRATING ORAL
Status: DISPENSED
Start: 2022-04-26

## (undated) RX ORDER — ACETAMINOPHEN 325 MG/1
TABLET ORAL
Status: DISPENSED
Start: 2021-07-30

## (undated) RX ORDER — METOCLOPRAMIDE HYDROCHLORIDE 5 MG/ML
INJECTION INTRAMUSCULAR; INTRAVENOUS
Status: DISPENSED
Start: 2025-03-06

## (undated) RX ORDER — ONDANSETRON 2 MG/ML
INJECTION INTRAMUSCULAR; INTRAVENOUS
Status: DISPENSED
Start: 2022-03-15